# Patient Record
Sex: FEMALE | Race: WHITE | Employment: OTHER | URBAN - METROPOLITAN AREA
[De-identification: names, ages, dates, MRNs, and addresses within clinical notes are randomized per-mention and may not be internally consistent; named-entity substitution may affect disease eponyms.]

---

## 2019-07-16 ENCOUNTER — APPOINTMENT (OUTPATIENT)
Dept: CT IMAGING | Age: 64
DRG: 378 | End: 2019-07-16
Payer: COMMERCIAL

## 2019-07-16 ENCOUNTER — HOSPITAL ENCOUNTER (INPATIENT)
Age: 64
LOS: 5 days | Discharge: HOME | DRG: 378 | End: 2019-07-21
Attending: EMERGENCY MEDICINE | Admitting: GENERAL PRACTICE
Payer: COMMERCIAL

## 2019-07-16 ENCOUNTER — ANESTHESIA EVENT (OUTPATIENT)
Dept: ENDOSCOPY | Age: 64
DRG: 378 | End: 2019-07-16
Payer: COMMERCIAL

## 2019-07-16 ENCOUNTER — ANESTHESIA (OUTPATIENT)
Dept: ENDOSCOPY | Age: 64
DRG: 378 | End: 2019-07-16
Payer: COMMERCIAL

## 2019-07-16 VITALS — DIASTOLIC BLOOD PRESSURE: 58 MMHG | SYSTOLIC BLOOD PRESSURE: 113 MMHG | OXYGEN SATURATION: 99 %

## 2019-07-16 DIAGNOSIS — N39.0 URINARY TRACT INFECTION IN FEMALE: ICD-10-CM

## 2019-07-16 DIAGNOSIS — R74.8 ELEVATED LIPASE: ICD-10-CM

## 2019-07-16 DIAGNOSIS — E11.65 UNCONTROLLED TYPE 2 DIABETES MELLITUS WITH HYPERGLYCEMIA (HCC): ICD-10-CM

## 2019-07-16 DIAGNOSIS — E87.5 HYPERKALEMIA: ICD-10-CM

## 2019-07-16 DIAGNOSIS — N28.9 RENAL INSUFFICIENCY: ICD-10-CM

## 2019-07-16 DIAGNOSIS — K92.2 UPPER GI BLEED: Primary | ICD-10-CM

## 2019-07-16 DIAGNOSIS — D64.9 ANEMIA, UNSPECIFIED TYPE: ICD-10-CM

## 2019-07-16 LAB
ABO/RH: NORMAL
ALBUMIN SERPL-MCNC: 3.9 G/DL (ref 3.5–5.2)
ALP BLD-CCNC: 47 U/L (ref 35–104)
ALT SERPL-CCNC: 17 U/L (ref 0–32)
ANION GAP SERPL CALCULATED.3IONS-SCNC: 18 MMOL/L (ref 7–16)
ANTIBODY SCREEN: NORMAL
AST SERPL-CCNC: 19 U/L (ref 0–31)
BACTERIA: ABNORMAL /HPF
BASOPHILS ABSOLUTE: 0.07 E9/L (ref 0–0.2)
BASOPHILS RELATIVE PERCENT: 0.6 % (ref 0–2)
BILIRUB SERPL-MCNC: 0.8 MG/DL (ref 0–1.2)
BILIRUBIN URINE: NEGATIVE
BLOOD, URINE: ABNORMAL
BUN BLDV-MCNC: 68 MG/DL (ref 8–23)
CALCIUM SERPL-MCNC: 8.9 MG/DL (ref 8.6–10.2)
CHLORIDE BLD-SCNC: 97 MMOL/L (ref 98–107)
CLARITY: CLEAR
CO2: 17 MMOL/L (ref 22–29)
COLOR: YELLOW
CREAT SERPL-MCNC: 1.2 MG/DL (ref 0.5–1)
EKG ATRIAL RATE: 85 BPM
EKG P AXIS: 50 DEGREES
EKG P-R INTERVAL: 180 MS
EKG Q-T INTERVAL: 378 MS
EKG QRS DURATION: 78 MS
EKG QTC CALCULATION (BAZETT): 449 MS
EKG R AXIS: 27 DEGREES
EKG T AXIS: 49 DEGREES
EKG VENTRICULAR RATE: 85 BPM
EOSINOPHILS ABSOLUTE: 0.08 E9/L (ref 0.05–0.5)
EOSINOPHILS RELATIVE PERCENT: 0.6 % (ref 0–6)
EPITHELIAL CELLS, UA: ABNORMAL /HPF
FERRITIN: 178 NG/ML
FOLATE: 5.7 NG/ML (ref 4.8–24.2)
GFR AFRICAN AMERICAN: 55
GFR NON-AFRICAN AMERICAN: 45 ML/MIN/1.73
GLUCOSE BLD-MCNC: 494 MG/DL (ref 74–99)
GLUCOSE URINE: >=1000 MG/DL
HCT VFR BLD CALC: 22.5 % (ref 34–48)
HCT VFR BLD CALC: 22.5 % (ref 34–48)
HCT VFR BLD CALC: 25.2 % (ref 34–48)
HEMOGLOBIN: 7.4 G/DL (ref 11.5–15.5)
HEMOGLOBIN: 8.4 G/DL (ref 11.5–15.5)
IMMATURE GRANULOCYTES #: 0.16 E9/L
IMMATURE GRANULOCYTES %: 1.3 % (ref 0–5)
IMMATURE RETIC FRACT: 25.9 % (ref 3–15.9)
INR BLD: 1.1
IRON SATURATION: 35 % (ref 15–50)
IRON: 89 MCG/DL (ref 37–145)
KETONES, URINE: ABNORMAL MG/DL
LACTIC ACID, SEPSIS: 4.5 MMOL/L (ref 0.5–1.9)
LEUKOCYTE ESTERASE, URINE: ABNORMAL
LIPASE: 153 U/L (ref 13–60)
LYMPHOCYTES ABSOLUTE: 0.99 E9/L (ref 1.5–4)
LYMPHOCYTES RELATIVE PERCENT: 8 % (ref 20–42)
MCH RBC QN AUTO: 30.8 PG (ref 26–35)
MCHC RBC AUTO-ENTMCNC: 33.3 % (ref 32–34.5)
MCV RBC AUTO: 92.3 FL (ref 80–99.9)
METER GLUCOSE: 377 MG/DL (ref 74–99)
METER GLUCOSE: 468 MG/DL (ref 74–99)
MONOCYTES ABSOLUTE: 0.5 E9/L (ref 0.1–0.95)
MONOCYTES RELATIVE PERCENT: 4.1 % (ref 2–12)
NEUTROPHILS ABSOLUTE: 10.52 E9/L (ref 1.8–7.3)
NEUTROPHILS RELATIVE PERCENT: 85.4 % (ref 43–80)
NITRITE, URINE: POSITIVE
PDW BLD-RTO: 13.4 FL (ref 11.5–15)
PH UA: 5 (ref 5–9)
PLATELET # BLD: 198 E9/L (ref 130–450)
PMV BLD AUTO: 9.9 FL (ref 7–12)
POTASSIUM SERPL-SCNC: 5.3 MMOL/L (ref 3.5–5)
PROTEIN UA: NEGATIVE MG/DL
PROTHROMBIN TIME: 12.5 SEC (ref 9.3–12.4)
RBC # BLD: 2.73 E12/L (ref 3.5–5.5)
RBC UA: ABNORMAL /HPF (ref 0–2)
RETIC HGB EQUIVALENT: 35.7 PG (ref 28.2–36.6)
RETICULOCYTE ABSOLUTE COUNT: 0.11 E12/L
RETICULOCYTE COUNT PCT: 4.3 % (ref 0.4–1.9)
SODIUM BLD-SCNC: 132 MMOL/L (ref 132–146)
SPECIFIC GRAVITY UA: 1.01 (ref 1–1.03)
TOTAL IRON BINDING CAPACITY: 255 MCG/DL (ref 250–450)
TOTAL PROTEIN: 6.6 G/DL (ref 6.4–8.3)
UROBILINOGEN, URINE: 0.2 E.U./DL
VITAMIN B-12: 240 PG/ML (ref 211–946)
WBC # BLD: 12.3 E9/L (ref 4.5–11.5)
WBC UA: ABNORMAL /HPF (ref 0–5)

## 2019-07-16 PROCEDURE — C9113 INJ PANTOPRAZOLE SODIUM, VIA: HCPCS | Performed by: EMERGENCY MEDICINE

## 2019-07-16 PROCEDURE — 83550 IRON BINDING TEST: CPT

## 2019-07-16 PROCEDURE — 81001 URINALYSIS AUTO W/SCOPE: CPT

## 2019-07-16 PROCEDURE — 87088 URINE BACTERIA CULTURE: CPT

## 2019-07-16 PROCEDURE — 2580000003 HC RX 258: Performed by: EMERGENCY MEDICINE

## 2019-07-16 PROCEDURE — 2580000003 HC RX 258

## 2019-07-16 PROCEDURE — 80053 COMPREHEN METABOLIC PANEL: CPT

## 2019-07-16 PROCEDURE — 94760 N-INVAS EAR/PLS OXIMETRY 1: CPT

## 2019-07-16 PROCEDURE — 36430 TRANSFUSION BLD/BLD COMPNT: CPT

## 2019-07-16 PROCEDURE — 85610 PROTHROMBIN TIME: CPT

## 2019-07-16 PROCEDURE — C9113 INJ PANTOPRAZOLE SODIUM, VIA: HCPCS | Performed by: HOSPITALIST

## 2019-07-16 PROCEDURE — 85025 COMPLETE CBC W/AUTO DIFF WBC: CPT

## 2019-07-16 PROCEDURE — 2580000003 HC RX 258: Performed by: NURSE ANESTHETIST, CERTIFIED REGISTERED

## 2019-07-16 PROCEDURE — 85014 HEMATOCRIT: CPT

## 2019-07-16 PROCEDURE — 36415 COLL VENOUS BLD VENIPUNCTURE: CPT

## 2019-07-16 PROCEDURE — 2580000003 HC RX 258: Performed by: RADIOLOGY

## 2019-07-16 PROCEDURE — 85045 AUTOMATED RETICULOCYTE COUNT: CPT

## 2019-07-16 PROCEDURE — 99285 EMERGENCY DEPT VISIT HI MDM: CPT

## 2019-07-16 PROCEDURE — 2720000010 HC SURG SUPPLY STERILE: Performed by: INTERNAL MEDICINE

## 2019-07-16 PROCEDURE — 86923 COMPATIBILITY TEST ELECTRIC: CPT

## 2019-07-16 PROCEDURE — 6360000002 HC RX W HCPCS: Performed by: EMERGENCY MEDICINE

## 2019-07-16 PROCEDURE — 2580000003 HC RX 258: Performed by: HOSPITALIST

## 2019-07-16 PROCEDURE — 2060000000 HC ICU INTERMEDIATE R&B

## 2019-07-16 PROCEDURE — 82746 ASSAY OF FOLIC ACID SERUM: CPT

## 2019-07-16 PROCEDURE — 6360000002 HC RX W HCPCS: Performed by: HOSPITALIST

## 2019-07-16 PROCEDURE — 3700000001 HC ADD 15 MINUTES (ANESTHESIA): Performed by: INTERNAL MEDICINE

## 2019-07-16 PROCEDURE — 74177 CT ABD & PELVIS W/CONTRAST: CPT

## 2019-07-16 PROCEDURE — 86900 BLOOD TYPING SEROLOGIC ABO: CPT

## 2019-07-16 PROCEDURE — 93010 ELECTROCARDIOGRAM REPORT: CPT | Performed by: INTERNAL MEDICINE

## 2019-07-16 PROCEDURE — 6370000000 HC RX 637 (ALT 250 FOR IP): Performed by: EMERGENCY MEDICINE

## 2019-07-16 PROCEDURE — 3609013000 HC EGD TRANSORAL CONTROL BLEEDING ANY METHOD: Performed by: INTERNAL MEDICINE

## 2019-07-16 PROCEDURE — 6360000002 HC RX W HCPCS: Performed by: NURSE ANESTHETIST, CERTIFIED REGISTERED

## 2019-07-16 PROCEDURE — 6360000002 HC RX W HCPCS: Performed by: INTERNAL MEDICINE

## 2019-07-16 PROCEDURE — 85018 HEMOGLOBIN: CPT

## 2019-07-16 PROCEDURE — 82728 ASSAY OF FERRITIN: CPT

## 2019-07-16 PROCEDURE — 83690 ASSAY OF LIPASE: CPT

## 2019-07-16 PROCEDURE — 83605 ASSAY OF LACTIC ACID: CPT

## 2019-07-16 PROCEDURE — 88305 TISSUE EXAM BY PATHOLOGIST: CPT

## 2019-07-16 PROCEDURE — 83540 ASSAY OF IRON: CPT

## 2019-07-16 PROCEDURE — 82607 VITAMIN B-12: CPT

## 2019-07-16 PROCEDURE — 3700000000 HC ANESTHESIA ATTENDED CARE: Performed by: INTERNAL MEDICINE

## 2019-07-16 PROCEDURE — 6360000004 HC RX CONTRAST MEDICATION: Performed by: RADIOLOGY

## 2019-07-16 PROCEDURE — 88342 IMHCHEM/IMCYTCHM 1ST ANTB: CPT

## 2019-07-16 PROCEDURE — P9016 RBC LEUKOCYTES REDUCED: HCPCS

## 2019-07-16 PROCEDURE — 87186 SC STD MICRODIL/AGAR DIL: CPT

## 2019-07-16 PROCEDURE — 2580000003 HC RX 258: Performed by: INTERNAL MEDICINE

## 2019-07-16 PROCEDURE — 7100000011 HC PHASE II RECOVERY - ADDTL 15 MIN: Performed by: INTERNAL MEDICINE

## 2019-07-16 PROCEDURE — 7100000010 HC PHASE II RECOVERY - FIRST 15 MIN: Performed by: INTERNAL MEDICINE

## 2019-07-16 PROCEDURE — 86901 BLOOD TYPING SEROLOGIC RH(D): CPT

## 2019-07-16 PROCEDURE — 86850 RBC ANTIBODY SCREEN: CPT

## 2019-07-16 PROCEDURE — 87077 CULTURE AEROBIC IDENTIFY: CPT

## 2019-07-16 PROCEDURE — 093K7ZZ CONTROL BLEEDING IN NASAL MUCOSA AND SOFT TISSUE, VIA NATURAL OR ARTIFICIAL OPENING: ICD-10-PCS | Performed by: INTERNAL MEDICINE

## 2019-07-16 PROCEDURE — 93005 ELECTROCARDIOGRAM TRACING: CPT | Performed by: EMERGENCY MEDICINE

## 2019-07-16 PROCEDURE — 2709999900 HC NON-CHARGEABLE SUPPLY: Performed by: INTERNAL MEDICINE

## 2019-07-16 PROCEDURE — 82962 GLUCOSE BLOOD TEST: CPT

## 2019-07-16 PROCEDURE — 0DB78ZX EXCISION OF STOMACH, PYLORUS, VIA NATURAL OR ARTIFICIAL OPENING ENDOSCOPIC, DIAGNOSTIC: ICD-10-PCS | Performed by: INTERNAL MEDICINE

## 2019-07-16 PROCEDURE — 96374 THER/PROPH/DIAG INJ IV PUSH: CPT

## 2019-07-16 PROCEDURE — P9040 RBC LEUKOREDUCED IRRADIATED: HCPCS

## 2019-07-16 RX ORDER — METOCLOPRAMIDE HYDROCHLORIDE 5 MG/ML
10 INJECTION INTRAMUSCULAR; INTRAVENOUS ONCE
Status: COMPLETED | OUTPATIENT
Start: 2019-07-16 | End: 2019-07-16

## 2019-07-16 RX ORDER — 0.9 % SODIUM CHLORIDE 0.9 %
1000 INTRAVENOUS SOLUTION INTRAVENOUS ONCE
Status: COMPLETED | OUTPATIENT
Start: 2019-07-16 | End: 2019-07-16

## 2019-07-16 RX ORDER — PROPOFOL 10 MG/ML
INJECTION, EMULSION INTRAVENOUS PRN
Status: DISCONTINUED | OUTPATIENT
Start: 2019-07-16 | End: 2019-07-16 | Stop reason: SDUPTHER

## 2019-07-16 RX ORDER — SODIUM CHLORIDE 0.9 % (FLUSH) 0.9 %
10 SYRINGE (ML) INJECTION ONCE
Status: COMPLETED | OUTPATIENT
Start: 2019-07-16 | End: 2019-07-16

## 2019-07-16 RX ORDER — SODIUM CHLORIDE 0.9 % (FLUSH) 0.9 %
SYRINGE (ML) INJECTION
Status: DISPENSED
Start: 2019-07-16 | End: 2019-07-17

## 2019-07-16 RX ORDER — SODIUM CHLORIDE 0.9 % (FLUSH) 0.9 %
SYRINGE (ML) INJECTION
Status: DISPENSED
Start: 2019-07-16 | End: 2019-07-16

## 2019-07-16 RX ORDER — LISINOPRIL 20 MG/1
20 TABLET ORAL DAILY
COMMUNITY

## 2019-07-16 RX ORDER — GABAPENTIN 300 MG/1
300 CAPSULE ORAL 2 TIMES DAILY
COMMUNITY

## 2019-07-16 RX ORDER — SODIUM CHLORIDE 0.9 % (FLUSH) 0.9 %
SYRINGE (ML) INJECTION
Status: COMPLETED
Start: 2019-07-16 | End: 2019-07-16

## 2019-07-16 RX ORDER — PANTOPRAZOLE SODIUM 40 MG/10ML
80 INJECTION, POWDER, LYOPHILIZED, FOR SOLUTION INTRAVENOUS ONCE
Status: COMPLETED | OUTPATIENT
Start: 2019-07-16 | End: 2019-07-16

## 2019-07-16 RX ORDER — SODIUM CHLORIDE 9 MG/ML
INJECTION, SOLUTION INTRAVENOUS CONTINUOUS
Status: DISCONTINUED | OUTPATIENT
Start: 2019-07-16 | End: 2019-07-19

## 2019-07-16 RX ORDER — BISOPROLOL FUMARATE AND HYDROCHLOROTHIAZIDE 10; 6.25 MG/1; MG/1
1 TABLET ORAL DAILY
COMMUNITY

## 2019-07-16 RX ORDER — GLIMEPIRIDE 2 MG/1
2 TABLET ORAL
COMMUNITY

## 2019-07-16 RX ORDER — SODIUM CHLORIDE 9 MG/ML
INJECTION, SOLUTION INTRAVENOUS CONTINUOUS PRN
Status: DISCONTINUED | OUTPATIENT
Start: 2019-07-16 | End: 2019-07-16 | Stop reason: SDUPTHER

## 2019-07-16 RX ORDER — 0.9 % SODIUM CHLORIDE 0.9 %
250 INTRAVENOUS SOLUTION INTRAVENOUS ONCE
Status: COMPLETED | OUTPATIENT
Start: 2019-07-16 | End: 2019-07-17

## 2019-07-16 RX ADMIN — PROPOFOL 200 MG: 10 INJECTION, EMULSION INTRAVENOUS at 15:24

## 2019-07-16 RX ADMIN — SODIUM CHLORIDE: 9 INJECTION, SOLUTION INTRAVENOUS at 13:40

## 2019-07-16 RX ADMIN — PROPOFOL 50 MG: 10 INJECTION, EMULSION INTRAVENOUS at 15:35

## 2019-07-16 RX ADMIN — METOCLOPRAMIDE 10 MG: 5 INJECTION, SOLUTION INTRAMUSCULAR; INTRAVENOUS at 12:58

## 2019-07-16 RX ADMIN — SODIUM CHLORIDE: 9 INJECTION, SOLUTION INTRAVENOUS at 12:32

## 2019-07-16 RX ADMIN — PANTOPRAZOLE SODIUM 8 MG/HR: 40 INJECTION, POWDER, FOR SOLUTION INTRAVENOUS at 12:31

## 2019-07-16 RX ADMIN — PROPOFOL 50 MG: 10 INJECTION, EMULSION INTRAVENOUS at 15:27

## 2019-07-16 RX ADMIN — Medication 10 ML: at 12:58

## 2019-07-16 RX ADMIN — CEFTRIAXONE 1 G: 1 INJECTION, POWDER, FOR SOLUTION INTRAMUSCULAR; INTRAVENOUS at 11:29

## 2019-07-16 RX ADMIN — PROPOFOL 50 MG: 10 INJECTION, EMULSION INTRAVENOUS at 15:43

## 2019-07-16 RX ADMIN — IOPAMIDOL 110 ML: 755 INJECTION, SOLUTION INTRAVENOUS at 09:36

## 2019-07-16 RX ADMIN — PANTOPRAZOLE SODIUM 8 MG/HR: 40 INJECTION, POWDER, FOR SOLUTION INTRAVENOUS at 22:09

## 2019-07-16 RX ADMIN — PANTOPRAZOLE SODIUM 80 MG: 40 INJECTION, POWDER, LYOPHILIZED, FOR SOLUTION INTRAVENOUS at 09:00

## 2019-07-16 RX ADMIN — PROPOFOL 50 MG: 10 INJECTION, EMULSION INTRAVENOUS at 15:32

## 2019-07-16 RX ADMIN — SODIUM CHLORIDE 250 ML: 9 INJECTION, SOLUTION INTRAVENOUS at 20:43

## 2019-07-16 RX ADMIN — SODIUM CHLORIDE 1000 ML: 9 INJECTION, SOLUTION INTRAVENOUS at 11:45

## 2019-07-16 RX ADMIN — PROPOFOL 50 MG: 10 INJECTION, EMULSION INTRAVENOUS at 15:39

## 2019-07-16 RX ADMIN — PROPOFOL 50 MG: 10 INJECTION, EMULSION INTRAVENOUS at 15:29

## 2019-07-16 RX ADMIN — Medication 10 ML: at 09:36

## 2019-07-16 RX ADMIN — INSULIN HUMAN 10 UNITS: 100 INJECTION, SOLUTION PARENTERAL at 11:40

## 2019-07-16 ASSESSMENT — PAIN SCALES - GENERAL
PAINLEVEL_OUTOF10: 0

## 2019-07-16 NOTE — ED PROVIDER NOTES
for comparison. Patient also is hyperglycemic with a glucose of 494. Patient given 10 units of insulin. Otherwise, patient with acute renal insufficiency with a creatinine of 1.2 and BUN of 68, presumably from the patient's acute GI bleed. Patient also has a nonspecifically elevated lipase of 153. Urinalysis is concerning for infection with positive nitrates, leukocytes, bacteria, and white blood cells. Urine sent for culture and patient started on 1 g of IV Rocephin. CT the abdomen and pelvis shows no significant acute intra-abdominal or intrapelvic disease process. There is no active extravasation. Pt continues to be NPO. Will admit for urgent endoscopy evaluation of source of GI bleed. Counseling: The emergency provider has spoken with the patient and family member patient and sister and discussed todays results, in addition to providing specific details for the plan of care and counseling regarding the diagnosis and prognosis. Questions are answered at this time and they are agreeable with the plan.      --------------------------------- IMPRESSION AND DISPOSITION ---------------------------------    IMPRESSION  1. Upper GI bleed    2. Renal insufficiency    3. Urinary tract infection in female    4. Anemia, unspecified type    5. Uncontrolled type 2 diabetes mellitus with hyperglycemia (HCC)    6. Elevated lipase    7. Hyperkalemia        DISPOSITION  Disposition: Admit to telemetry  Patient condition is stable      NOTE: This report was transcribed using voice recognition software.  Every effort was made to ensure accuracy; however, inadvertent computerized transcription errors may be present        Ann Liriano MD  07/16/19 [id number]

## 2019-07-16 NOTE — ANESTHESIA PRE PROCEDURE
Department of Anesthesiology  Preprocedure Note       Name:  Mercedez Clark   Age:  61 y.o.  :  1955                                          MRN:  [id number]         Date:  2019      Surgeon: Merlene Owens):  Susi Issa MD    Procedure: EGD ESOPHAGOGASTRODUODENOSCOPY (N/A )    Medications prior to admission:   Prior to Admission medications    Medication Sig Start Date End Date Taking? Authorizing Provider   glimepiride (AMARYL) 2 MG tablet Take 2 mg by mouth every morning (before breakfast)   Yes Historical Provider, MD   gabapentin (NEURONTIN) 300 MG capsule Take 300 mg by mouth 2 times daily. Yes Historical Provider, MD   SITagliptin (JANUVIA) 50 MG tablet Take 50 mg by mouth daily   Yes Historical Provider, MD   bisoprolol-hydrochlorothiazide (ZIAC) 10-6.25 MG per tablet Take 1 tablet by mouth daily   Yes Historical Provider, MD   lisinopril (PRINIVIL;ZESTRIL) 20 MG tablet Take 20 mg by mouth daily   Yes Historical Provider, MD   metformin (GLUCOPHAGE) 500 MG tablet Take 500 mg by mouth 2 times daily (with meals).    Yes Historical Provider, MD       Current medications:    Current Facility-Administered Medications   Medication Dose Route Frequency Provider Last Rate Last Dose    sodium chloride flush 0.9 % injection             pantoprazole (PROTONIX) 80 mg in sodium chloride 0.9 % 100 mL infusion  8 mg/hr Intravenous Continuous Elke Adames MD 10 mL/hr at 19 1231 8 mg/hr at 19 1231    0.9 % sodium chloride infusion   Intravenous Continuous Elke Adames  mL/hr at 19 1232         Allergies:  No Known Allergies    Problem List:    Patient Active Problem List   Diagnosis Code    GI bleed K92.2       Past Medical History:        Diagnosis Date    Diabetes mellitus ([facility] [address]     poorly controlled    Hypertension        Past Surgical History:        Procedure Laterality Date    APPENDECTOMY      CARPAL TUNNEL RELEASE      FRACTURE SURGERY      rt ankle

## 2019-07-17 LAB
ALBUMIN SERPL-MCNC: 3.7 G/DL (ref 3.5–5.2)
ALP BLD-CCNC: 36 U/L (ref 35–104)
ALT SERPL-CCNC: 13 U/L (ref 0–32)
ANION GAP SERPL CALCULATED.3IONS-SCNC: 11 MMOL/L (ref 7–16)
AST SERPL-CCNC: 12 U/L (ref 0–31)
BILIRUB SERPL-MCNC: 1 MG/DL (ref 0–1.2)
BUN BLDV-MCNC: 64 MG/DL (ref 8–23)
CALCIUM SERPL-MCNC: 8.5 MG/DL (ref 8.6–10.2)
CHLORIDE BLD-SCNC: 101 MMOL/L (ref 98–107)
CO2: 18 MMOL/L (ref 22–29)
CREAT SERPL-MCNC: 1.1 MG/DL (ref 0.5–1)
GFR AFRICAN AMERICAN: >60
GFR NON-AFRICAN AMERICAN: 50 ML/MIN/1.73
GLUCOSE BLD-MCNC: 523 MG/DL (ref 74–99)
HCT VFR BLD CALC: 21.4 % (ref 34–48)
HCT VFR BLD CALC: 23.1 % (ref 34–48)
HCT VFR BLD CALC: 23.4 % (ref 34–48)
HCT VFR BLD CALC: 27.8 % (ref 34–48)
HEMOGLOBIN: 7.2 G/DL (ref 11.5–15.5)
HEMOGLOBIN: 7.6 G/DL (ref 11.5–15.5)
HEMOGLOBIN: 8 G/DL (ref 11.5–15.5)
HEMOGLOBIN: 9.2 G/DL (ref 11.5–15.5)
METER GLUCOSE: 159 MG/DL (ref 74–99)
METER GLUCOSE: 293 MG/DL (ref 74–99)
METER GLUCOSE: 342 MG/DL (ref 74–99)
METER GLUCOSE: 493 MG/DL (ref 74–99)
POTASSIUM SERPL-SCNC: 4.8 MMOL/L (ref 3.5–5)
SODIUM BLD-SCNC: 130 MMOL/L (ref 132–146)
TOTAL PROTEIN: 6 G/DL (ref 6.4–8.3)

## 2019-07-17 PROCEDURE — 2060000000 HC ICU INTERMEDIATE R&B

## 2019-07-17 PROCEDURE — 82272 OCCULT BLD FECES 1-3 TESTS: CPT

## 2019-07-17 PROCEDURE — 6360000002 HC RX W HCPCS: Performed by: HOSPITALIST

## 2019-07-17 PROCEDURE — 85018 HEMOGLOBIN: CPT

## 2019-07-17 PROCEDURE — 85014 HEMATOCRIT: CPT

## 2019-07-17 PROCEDURE — 2580000003 HC RX 258: Performed by: HOSPITALIST

## 2019-07-17 PROCEDURE — 86923 COMPATIBILITY TEST ELECTRIC: CPT

## 2019-07-17 PROCEDURE — 6370000000 HC RX 637 (ALT 250 FOR IP): Performed by: GENERAL PRACTICE

## 2019-07-17 PROCEDURE — 80053 COMPREHEN METABOLIC PANEL: CPT

## 2019-07-17 PROCEDURE — C9113 INJ PANTOPRAZOLE SODIUM, VIA: HCPCS | Performed by: HOSPITALIST

## 2019-07-17 PROCEDURE — P9016 RBC LEUKOCYTES REDUCED: HCPCS

## 2019-07-17 PROCEDURE — 6360000002 HC RX W HCPCS: Performed by: GENERAL PRACTICE

## 2019-07-17 PROCEDURE — 82962 GLUCOSE BLOOD TEST: CPT

## 2019-07-17 PROCEDURE — 36415 COLL VENOUS BLD VENIPUNCTURE: CPT

## 2019-07-17 RX ORDER — GABAPENTIN 300 MG/1
300 CAPSULE ORAL 2 TIMES DAILY
Status: DISCONTINUED | OUTPATIENT
Start: 2019-07-17 | End: 2019-07-21 | Stop reason: HOSPADM

## 2019-07-17 RX ORDER — 0.9 % SODIUM CHLORIDE 0.9 %
250 INTRAVENOUS SOLUTION INTRAVENOUS ONCE
Status: DISCONTINUED | OUTPATIENT
Start: 2019-07-17 | End: 2019-07-21 | Stop reason: HOSPADM

## 2019-07-17 RX ORDER — HYDROCHLOROTHIAZIDE 12.5 MG/1
6.25 TABLET ORAL DAILY
Status: DISCONTINUED | OUTPATIENT
Start: 2019-07-17 | End: 2019-07-21 | Stop reason: HOSPADM

## 2019-07-17 RX ORDER — LISINOPRIL 20 MG/1
20 TABLET ORAL DAILY
Status: DISCONTINUED | OUTPATIENT
Start: 2019-07-17 | End: 2019-07-21 | Stop reason: HOSPADM

## 2019-07-17 RX ORDER — BISOPROLOL FUMARATE AND HYDROCHLOROTHIAZIDE 10; 6.25 MG/1; MG/1
1 TABLET ORAL DAILY
Status: DISCONTINUED | OUTPATIENT
Start: 2019-07-17 | End: 2019-07-17 | Stop reason: CLARIF

## 2019-07-17 RX ORDER — METOPROLOL TARTRATE 50 MG/1
50 TABLET, FILM COATED ORAL 2 TIMES DAILY
Status: DISCONTINUED | OUTPATIENT
Start: 2019-07-17 | End: 2019-07-21 | Stop reason: HOSPADM

## 2019-07-17 RX ORDER — BISOPROLOL FUMARATE AND HYDROCHLOROTHIAZIDE 10; 6.25 MG/1; MG/1
1 TABLET ORAL DAILY
Status: DISCONTINUED | OUTPATIENT
Start: 2019-07-17 | End: 2019-07-17 | Stop reason: ALTCHOICE

## 2019-07-17 RX ORDER — GLIMEPIRIDE 2 MG/1
2 TABLET ORAL
Status: DISCONTINUED | OUTPATIENT
Start: 2019-07-17 | End: 2019-07-21 | Stop reason: HOSPADM

## 2019-07-17 RX ORDER — ONDANSETRON 2 MG/ML
4 INJECTION INTRAMUSCULAR; INTRAVENOUS EVERY 6 HOURS PRN
Status: DISCONTINUED | OUTPATIENT
Start: 2019-07-17 | End: 2019-07-21 | Stop reason: HOSPADM

## 2019-07-17 RX ADMIN — INSULIN LISPRO 15 UNITS: 100 INJECTION, SOLUTION INTRAVENOUS; SUBCUTANEOUS at 12:10

## 2019-07-17 RX ADMIN — PANTOPRAZOLE SODIUM 8 MG/HR: 40 INJECTION, POWDER, FOR SOLUTION INTRAVENOUS at 09:38

## 2019-07-17 RX ADMIN — METOPROLOL TARTRATE 50 MG: 50 TABLET ORAL at 09:37

## 2019-07-17 RX ADMIN — INSULIN LISPRO 1 UNITS: 100 INJECTION, SOLUTION INTRAVENOUS; SUBCUTANEOUS at 21:56

## 2019-07-17 RX ADMIN — HYDROCHLOROTHIAZIDE 6.25 MG: 12.5 TABLET ORAL at 09:37

## 2019-07-17 RX ADMIN — GABAPENTIN 300 MG: 300 CAPSULE ORAL at 09:37

## 2019-07-17 RX ADMIN — LISINOPRIL 20 MG: 20 TABLET ORAL at 09:37

## 2019-07-17 RX ADMIN — INSULIN LISPRO 3 UNITS: 100 INJECTION, SOLUTION INTRAVENOUS; SUBCUTANEOUS at 16:23

## 2019-07-17 RX ADMIN — GABAPENTIN 300 MG: 300 CAPSULE ORAL at 21:56

## 2019-07-17 RX ADMIN — LINAGLIPTIN 5 MG: 5 TABLET, FILM COATED ORAL at 09:37

## 2019-07-17 RX ADMIN — ONDANSETRON 4 MG: 2 INJECTION INTRAMUSCULAR; INTRAVENOUS at 09:37

## 2019-07-17 RX ADMIN — INSULIN LISPRO 6 UNITS: 100 INJECTION, SOLUTION INTRAVENOUS; SUBCUTANEOUS at 11:25

## 2019-07-17 RX ADMIN — GLIMEPIRIDE 2 MG: 2 TABLET ORAL at 09:37

## 2019-07-17 ASSESSMENT — PAIN SCALES - GENERAL
PAINLEVEL_OUTOF10: 0

## 2019-07-18 ENCOUNTER — ANESTHESIA (OUTPATIENT)
Dept: ENDOSCOPY | Age: 64
DRG: 378 | End: 2019-07-18
Payer: COMMERCIAL

## 2019-07-18 ENCOUNTER — APPOINTMENT (OUTPATIENT)
Dept: NUCLEAR MEDICINE | Age: 64
DRG: 378 | End: 2019-07-18
Payer: COMMERCIAL

## 2019-07-18 ENCOUNTER — ANESTHESIA EVENT (OUTPATIENT)
Dept: ENDOSCOPY | Age: 64
DRG: 378 | End: 2019-07-18
Payer: COMMERCIAL

## 2019-07-18 VITALS
OXYGEN SATURATION: 100 % | DIASTOLIC BLOOD PRESSURE: 51 MMHG | SYSTOLIC BLOOD PRESSURE: 94 MMHG | RESPIRATION RATE: 21 BRPM

## 2019-07-18 LAB
HCT VFR BLD CALC: 19.8 % (ref 34–48)
HCT VFR BLD CALC: 23.3 % (ref 34–48)
HCT VFR BLD CALC: 23.4 % (ref 34–48)
HEMOGLOBIN: 6.5 G/DL (ref 11.5–15.5)
HEMOGLOBIN: 7.6 G/DL (ref 11.5–15.5)
HEMOGLOBIN: 7.9 G/DL (ref 11.5–15.5)
METER GLUCOSE: 233 MG/DL (ref 74–99)
METER GLUCOSE: 285 MG/DL (ref 74–99)
METER GLUCOSE: 288 MG/DL (ref 74–99)
METER GLUCOSE: 292 MG/DL (ref 74–99)
METER GLUCOSE: 324 MG/DL (ref 74–99)
OCCULT BLOOD DIAGNOSTIC: NORMAL
URINE CULTURE, ROUTINE: NORMAL

## 2019-07-18 PROCEDURE — P9016 RBC LEUKOCYTES REDUCED: HCPCS

## 2019-07-18 PROCEDURE — 85018 HEMOGLOBIN: CPT

## 2019-07-18 PROCEDURE — C9113 INJ PANTOPRAZOLE SODIUM, VIA: HCPCS | Performed by: HOSPITALIST

## 2019-07-18 PROCEDURE — 6360000002 HC RX W HCPCS: Performed by: GENERAL PRACTICE

## 2019-07-18 PROCEDURE — 6360000002 HC RX W HCPCS: Performed by: HOSPITALIST

## 2019-07-18 PROCEDURE — 3609013000 HC EGD TRANSORAL CONTROL BLEEDING ANY METHOD: Performed by: INTERNAL MEDICINE

## 2019-07-18 PROCEDURE — 85014 HEMATOCRIT: CPT

## 2019-07-18 PROCEDURE — A9560 TC99M LABELED RBC: HCPCS | Performed by: RADIOLOGY

## 2019-07-18 PROCEDURE — 0DJ08ZZ INSPECTION OF UPPER INTESTINAL TRACT, VIA NATURAL OR ARTIFICIAL OPENING ENDOSCOPIC: ICD-10-PCS | Performed by: INTERNAL MEDICINE

## 2019-07-18 PROCEDURE — 2720000010 HC SURG SUPPLY STERILE: Performed by: INTERNAL MEDICINE

## 2019-07-18 PROCEDURE — 3700000001 HC ADD 15 MINUTES (ANESTHESIA): Performed by: INTERNAL MEDICINE

## 2019-07-18 PROCEDURE — 6370000000 HC RX 637 (ALT 250 FOR IP): Performed by: GENERAL PRACTICE

## 2019-07-18 PROCEDURE — 2060000000 HC ICU INTERMEDIATE R&B

## 2019-07-18 PROCEDURE — 7100000011 HC PHASE II RECOVERY - ADDTL 15 MIN: Performed by: INTERNAL MEDICINE

## 2019-07-18 PROCEDURE — 36415 COLL VENOUS BLD VENIPUNCTURE: CPT

## 2019-07-18 PROCEDURE — 86923 COMPATIBILITY TEST ELECTRIC: CPT

## 2019-07-18 PROCEDURE — 82962 GLUCOSE BLOOD TEST: CPT

## 2019-07-18 PROCEDURE — 2580000003 HC RX 258: Performed by: NURSE ANESTHETIST, CERTIFIED REGISTERED

## 2019-07-18 PROCEDURE — 3700000000 HC ANESTHESIA ATTENDED CARE: Performed by: INTERNAL MEDICINE

## 2019-07-18 PROCEDURE — 7100000010 HC PHASE II RECOVERY - FIRST 15 MIN: Performed by: INTERNAL MEDICINE

## 2019-07-18 PROCEDURE — 2580000003 HC RX 258: Performed by: HOSPITALIST

## 2019-07-18 PROCEDURE — 6360000002 HC RX W HCPCS: Performed by: NURSE ANESTHETIST, CERTIFIED REGISTERED

## 2019-07-18 PROCEDURE — 36430 TRANSFUSION BLD/BLD COMPNT: CPT

## 2019-07-18 PROCEDURE — 2709999900 HC NON-CHARGEABLE SUPPLY: Performed by: INTERNAL MEDICINE

## 2019-07-18 PROCEDURE — 6370000000 HC RX 637 (ALT 250 FOR IP): Performed by: INTERNAL MEDICINE

## 2019-07-18 PROCEDURE — 78278 ACUTE GI BLOOD LOSS IMAGING: CPT

## 2019-07-18 PROCEDURE — 3430000000 HC RX DIAGNOSTIC RADIOPHARMACEUTICAL: Performed by: RADIOLOGY

## 2019-07-18 RX ORDER — 0.9 % SODIUM CHLORIDE 0.9 %
250 INTRAVENOUS SOLUTION INTRAVENOUS ONCE
Status: COMPLETED | OUTPATIENT
Start: 2019-07-18 | End: 2019-07-18

## 2019-07-18 RX ORDER — PROPOFOL 10 MG/ML
INJECTION, EMULSION INTRAVENOUS PRN
Status: DISCONTINUED | OUTPATIENT
Start: 2019-07-18 | End: 2019-07-18 | Stop reason: SDUPTHER

## 2019-07-18 RX ORDER — SODIUM CHLORIDE 9 MG/ML
INJECTION, SOLUTION INTRAVENOUS CONTINUOUS PRN
Status: DISCONTINUED | OUTPATIENT
Start: 2019-07-18 | End: 2019-07-18 | Stop reason: SDUPTHER

## 2019-07-18 RX ADMIN — ONDANSETRON 4 MG: 2 INJECTION INTRAMUSCULAR; INTRAVENOUS at 22:40

## 2019-07-18 RX ADMIN — MAGNESIUM CITRATE 296 ML: 1.75 LIQUID ORAL at 20:18

## 2019-07-18 RX ADMIN — GABAPENTIN 300 MG: 300 CAPSULE ORAL at 20:10

## 2019-07-18 RX ADMIN — SODIUM CHLORIDE: 9 INJECTION, SOLUTION INTRAVENOUS at 16:26

## 2019-07-18 RX ADMIN — Medication 20 MILLICURIE: at 08:14

## 2019-07-18 RX ADMIN — INSULIN LISPRO 3 UNITS: 100 INJECTION, SOLUTION INTRAVENOUS; SUBCUTANEOUS at 11:38

## 2019-07-18 RX ADMIN — INSULIN LISPRO 2 UNITS: 100 INJECTION, SOLUTION INTRAVENOUS; SUBCUTANEOUS at 20:09

## 2019-07-18 RX ADMIN — SODIUM CHLORIDE: 9 INJECTION, SOLUTION INTRAVENOUS at 11:43

## 2019-07-18 RX ADMIN — PANTOPRAZOLE SODIUM 8 MG/HR: 40 INJECTION, POWDER, FOR SOLUTION INTRAVENOUS at 00:02

## 2019-07-18 RX ADMIN — GABAPENTIN 300 MG: 300 CAPSULE ORAL at 11:34

## 2019-07-18 RX ADMIN — PANTOPRAZOLE SODIUM 8 MG/HR: 40 INJECTION, POWDER, FOR SOLUTION INTRAVENOUS at 11:43

## 2019-07-18 RX ADMIN — PANTOPRAZOLE SODIUM 8 MG/HR: 40 INJECTION, POWDER, FOR SOLUTION INTRAVENOUS at 22:40

## 2019-07-18 RX ADMIN — INSULIN LISPRO 3 UNITS: 100 INJECTION, SOLUTION INTRAVENOUS; SUBCUTANEOUS at 18:23

## 2019-07-18 RX ADMIN — PROPOFOL 150 MG: 10 INJECTION, EMULSION INTRAVENOUS at 16:27

## 2019-07-18 RX ADMIN — SODIUM CHLORIDE 250 ML: 9 INJECTION, SOLUTION INTRAVENOUS at 15:52

## 2019-07-18 RX ADMIN — LINAGLIPTIN 5 MG: 5 TABLET, FILM COATED ORAL at 11:34

## 2019-07-18 RX ADMIN — METOPROLOL TARTRATE 50 MG: 50 TABLET ORAL at 11:34

## 2019-07-18 RX ADMIN — INSULIN LISPRO 2 UNITS: 100 INJECTION, SOLUTION INTRAVENOUS; SUBCUTANEOUS at 05:40

## 2019-07-18 ASSESSMENT — PAIN SCALES - GENERAL
PAINLEVEL_OUTOF10: 0

## 2019-07-18 NOTE — PROCEDURES
has never had a colonoscopy and I recommend doing tomorrow as I do not see strong evidence of rebleed of the duodenal ulcer at this time. See orders. Follow up as outpatient in office, call [phone] to schedule for appointment.       DO Ilan  7/18/2019  4:34 PM

## 2019-07-19 ENCOUNTER — ANESTHESIA EVENT (OUTPATIENT)
Dept: ENDOSCOPY | Age: 64
DRG: 378 | End: 2019-07-19
Payer: COMMERCIAL

## 2019-07-19 ENCOUNTER — ANESTHESIA (OUTPATIENT)
Dept: ENDOSCOPY | Age: 64
DRG: 378 | End: 2019-07-19
Payer: COMMERCIAL

## 2019-07-19 VITALS — SYSTOLIC BLOOD PRESSURE: 89 MMHG | DIASTOLIC BLOOD PRESSURE: 55 MMHG | OXYGEN SATURATION: 100 %

## 2019-07-19 LAB
HCT VFR BLD CALC: 22.7 % (ref 34–48)
HCT VFR BLD CALC: 24.7 % (ref 34–48)
HEMOGLOBIN: 7.4 G/DL (ref 11.5–15.5)
HEMOGLOBIN: 8.1 G/DL (ref 11.5–15.5)
METER GLUCOSE: 164 MG/DL (ref 74–99)
METER GLUCOSE: 172 MG/DL (ref 74–99)
METER GLUCOSE: 185 MG/DL (ref 74–99)
METER GLUCOSE: 220 MG/DL (ref 74–99)

## 2019-07-19 PROCEDURE — 2580000003 HC RX 258: Performed by: NURSE ANESTHETIST, CERTIFIED REGISTERED

## 2019-07-19 PROCEDURE — 6370000000 HC RX 637 (ALT 250 FOR IP): Performed by: GENERAL PRACTICE

## 2019-07-19 PROCEDURE — 2709999900 HC NON-CHARGEABLE SUPPLY: Performed by: INTERNAL MEDICINE

## 2019-07-19 PROCEDURE — 85018 HEMOGLOBIN: CPT

## 2019-07-19 PROCEDURE — 2580000003 HC RX 258: Performed by: HOSPITALIST

## 2019-07-19 PROCEDURE — 6360000002 HC RX W HCPCS: Performed by: NURSE ANESTHETIST, CERTIFIED REGISTERED

## 2019-07-19 PROCEDURE — 3609009500 HC COLONOSCOPY DIAGNOSTIC OR SCREENING: Performed by: INTERNAL MEDICINE

## 2019-07-19 PROCEDURE — 7100000010 HC PHASE II RECOVERY - FIRST 15 MIN: Performed by: INTERNAL MEDICINE

## 2019-07-19 PROCEDURE — 7100000011 HC PHASE II RECOVERY - ADDTL 15 MIN: Performed by: INTERNAL MEDICINE

## 2019-07-19 PROCEDURE — 0DJD8ZZ INSPECTION OF LOWER INTESTINAL TRACT, VIA NATURAL OR ARTIFICIAL OPENING ENDOSCOPIC: ICD-10-PCS | Performed by: INTERNAL MEDICINE

## 2019-07-19 PROCEDURE — 36415 COLL VENOUS BLD VENIPUNCTURE: CPT

## 2019-07-19 PROCEDURE — 6360000002 HC RX W HCPCS: Performed by: HOSPITALIST

## 2019-07-19 PROCEDURE — 82962 GLUCOSE BLOOD TEST: CPT

## 2019-07-19 PROCEDURE — 3700000001 HC ADD 15 MINUTES (ANESTHESIA): Performed by: INTERNAL MEDICINE

## 2019-07-19 PROCEDURE — 85014 HEMATOCRIT: CPT

## 2019-07-19 PROCEDURE — 3700000000 HC ANESTHESIA ATTENDED CARE: Performed by: INTERNAL MEDICINE

## 2019-07-19 PROCEDURE — 2060000000 HC ICU INTERMEDIATE R&B

## 2019-07-19 PROCEDURE — C9113 INJ PANTOPRAZOLE SODIUM, VIA: HCPCS | Performed by: HOSPITALIST

## 2019-07-19 RX ORDER — PROPOFOL 10 MG/ML
INJECTION, EMULSION INTRAVENOUS PRN
Status: DISCONTINUED | OUTPATIENT
Start: 2019-07-19 | End: 2019-07-19 | Stop reason: SDUPTHER

## 2019-07-19 RX ORDER — SODIUM CHLORIDE 9 MG/ML
INJECTION, SOLUTION INTRAVENOUS CONTINUOUS PRN
Status: DISCONTINUED | OUTPATIENT
Start: 2019-07-19 | End: 2019-07-19 | Stop reason: SDUPTHER

## 2019-07-19 RX ADMIN — GABAPENTIN 300 MG: 300 CAPSULE ORAL at 20:31

## 2019-07-19 RX ADMIN — PROPOFOL 150 MG: 10 INJECTION, EMULSION INTRAVENOUS at 16:12

## 2019-07-19 RX ADMIN — SODIUM CHLORIDE: 9 INJECTION, SOLUTION INTRAVENOUS at 15:58

## 2019-07-19 RX ADMIN — PANTOPRAZOLE SODIUM 8 MG/HR: 40 INJECTION, POWDER, FOR SOLUTION INTRAVENOUS at 09:29

## 2019-07-19 RX ADMIN — PANTOPRAZOLE SODIUM 8 MG/HR: 40 INJECTION, POWDER, FOR SOLUTION INTRAVENOUS at 19:11

## 2019-07-19 RX ADMIN — SODIUM CHLORIDE: 9 INJECTION, SOLUTION INTRAVENOUS at 08:06

## 2019-07-19 RX ADMIN — METFORMIN HYDROCHLORIDE 500 MG: 500 TABLET ORAL at 18:17

## 2019-07-19 RX ADMIN — PROPOFOL 70 MG: 10 INJECTION, EMULSION INTRAVENOUS at 16:17

## 2019-07-19 RX ADMIN — INSULIN LISPRO 2 UNITS: 100 INJECTION, SOLUTION INTRAVENOUS; SUBCUTANEOUS at 05:55

## 2019-07-19 RX ADMIN — SODIUM CHLORIDE: 9 INJECTION, SOLUTION INTRAVENOUS at 01:08

## 2019-07-19 RX ADMIN — METOPROLOL TARTRATE 50 MG: 50 TABLET ORAL at 20:31

## 2019-07-19 ASSESSMENT — PAIN SCALES - GENERAL
PAINLEVEL_OUTOF10: 0

## 2019-07-19 NOTE — ANESTHESIA PRE PROCEDURE
Department of Anesthesiology  Preprocedure Note       Name:  Nancy Fink   Age:  61 y.o.  :  1955                                          MRN:  [id number]         Date:  2019      Surgeon: Earline Rodriguez):  Yousuf Crain MD    Procedure: COLONOSCOPY DIAGNOSTIC (N/A )    Medications prior to admission:   Prior to Admission medications    Medication Sig Start Date End Date Taking? Authorizing Provider   glimepiride (AMARYL) 2 MG tablet Take 2 mg by mouth every morning (before breakfast)    Historical Provider, MD   gabapentin (NEURONTIN) 300 MG capsule Take 300 mg by mouth 2 times daily. Historical Provider, MD   SITagliptin (JANUVIA) 50 MG tablet Take 50 mg by mouth daily    Historical Provider, MD   bisoprolol-hydrochlorothiazide (ZIAC) 10-6.25 MG per tablet Take 1 tablet by mouth daily    Historical Provider, MD   lisinopril (PRINIVIL;ZESTRIL) 20 MG tablet Take 20 mg by mouth daily    Historical Provider, MD   metformin (GLUCOPHAGE) 500 MG tablet Take 500 mg by mouth 2 times daily (with meals). Historical Provider, MD       Current medications:    No current facility-administered medications for this visit. No current outpatient medications on file.      Facility-Administered Medications Ordered in Other Visits   Medication Dose Route Frequency Provider Last Rate Last Dose    bisacodyl (DULCOLAX) EC tablet 5 mg  5 mg Oral Daily PRN Chris Merrill DO        gabapentin (NEURONTIN) capsule 300 mg  300 mg Oral BID Tejal Darter, DO   300 mg at 19    glimepiride (AMARYL) tablet 2 mg  2 mg Oral QAM AC Tejal Darfrancisco javier, DO   Stopped at 19 0700    lisinopril (PRINIVIL;ZESTRIL) tablet 20 mg  20 mg Oral Daily Tejal Darter, DO   20 mg at 19 8004    linagliptin (TRADJENTA) tablet 5 mg  5 mg Oral Daily Tejal Darter, DO   5 mg at 19 1134    ondansetron (ZOFRAN) injection 4 mg  4 mg Intravenous Q6H PRN Tejal Srivastava, DO   4 mg at 19 2240    insulin lispro (HUMALOG)

## 2019-07-20 LAB
ANION GAP SERPL CALCULATED.3IONS-SCNC: 12 MMOL/L (ref 7–16)
BLOOD BANK DISPENSE STATUS: NORMAL
BLOOD BANK PRODUCT CODE: NORMAL
BPU ID: NORMAL
BUN BLDV-MCNC: 12 MG/DL (ref 8–23)
CALCIUM SERPL-MCNC: 8.3 MG/DL (ref 8.6–10.2)
CHLORIDE BLD-SCNC: 113 MMOL/L (ref 98–107)
CO2: 15 MMOL/L (ref 22–29)
CREAT SERPL-MCNC: 1.1 MG/DL (ref 0.5–1)
DESCRIPTION BLOOD BANK: NORMAL
GFR AFRICAN AMERICAN: >60
GFR NON-AFRICAN AMERICAN: 50 ML/MIN/1.73
GLUCOSE BLD-MCNC: 149 MG/DL (ref 74–99)
HCT VFR BLD CALC: 25.4 % (ref 34–48)
HEMOGLOBIN: 8.1 G/DL (ref 11.5–15.5)
METER GLUCOSE: 116 MG/DL (ref 74–99)
METER GLUCOSE: 145 MG/DL (ref 74–99)
METER GLUCOSE: 157 MG/DL (ref 74–99)
METER GLUCOSE: 84 MG/DL (ref 74–99)
POTASSIUM SERPL-SCNC: 3.5 MMOL/L (ref 3.5–5)
SODIUM BLD-SCNC: 140 MMOL/L (ref 132–146)

## 2019-07-20 PROCEDURE — 2580000003 HC RX 258: Performed by: HOSPITALIST

## 2019-07-20 PROCEDURE — 36415 COLL VENOUS BLD VENIPUNCTURE: CPT

## 2019-07-20 PROCEDURE — 6360000002 HC RX W HCPCS: Performed by: HOSPITALIST

## 2019-07-20 PROCEDURE — C9113 INJ PANTOPRAZOLE SODIUM, VIA: HCPCS | Performed by: HOSPITALIST

## 2019-07-20 PROCEDURE — 80048 BASIC METABOLIC PNL TOTAL CA: CPT

## 2019-07-20 PROCEDURE — 6370000000 HC RX 637 (ALT 250 FOR IP): Performed by: GENERAL PRACTICE

## 2019-07-20 PROCEDURE — 85014 HEMATOCRIT: CPT

## 2019-07-20 PROCEDURE — 2060000000 HC ICU INTERMEDIATE R&B

## 2019-07-20 PROCEDURE — 82962 GLUCOSE BLOOD TEST: CPT

## 2019-07-20 PROCEDURE — 85018 HEMOGLOBIN: CPT

## 2019-07-20 RX ORDER — PANTOPRAZOLE SODIUM 40 MG/1
40 TABLET, DELAYED RELEASE ORAL
Status: DISCONTINUED | OUTPATIENT
Start: 2019-07-20 | End: 2019-07-21 | Stop reason: HOSPADM

## 2019-07-20 RX ORDER — SUCRALFATE 1 G/1
1 TABLET ORAL EVERY 6 HOURS SCHEDULED
Status: DISCONTINUED | OUTPATIENT
Start: 2019-07-20 | End: 2019-07-21 | Stop reason: HOSPADM

## 2019-07-20 RX ORDER — FERROUS SULFATE 325(65) MG
325 TABLET ORAL 2 TIMES DAILY WITH MEALS
Status: DISCONTINUED | OUTPATIENT
Start: 2019-07-20 | End: 2019-07-21 | Stop reason: HOSPADM

## 2019-07-20 RX ORDER — PANTOPRAZOLE SODIUM 40 MG/1
40 TABLET, DELAYED RELEASE ORAL
Qty: 180 TABLET | Refills: 1 | Status: SHIPPED | OUTPATIENT
Start: 2019-07-20

## 2019-07-20 RX ORDER — SUCRALFATE ORAL 1 G/10ML
1 SUSPENSION ORAL EVERY 6 HOURS SCHEDULED
Status: DISCONTINUED | OUTPATIENT
Start: 2019-07-20 | End: 2019-07-20 | Stop reason: CLARIF

## 2019-07-20 RX ADMIN — FERROUS SULFATE TAB 325 MG (65 MG ELEMENTAL FE) 325 MG: 325 (65 FE) TAB at 16:33

## 2019-07-20 RX ADMIN — METFORMIN HYDROCHLORIDE 500 MG: 500 TABLET ORAL at 08:07

## 2019-07-20 RX ADMIN — PANTOPRAZOLE SODIUM 8 MG/HR: 40 INJECTION, POWDER, FOR SOLUTION INTRAVENOUS at 04:12

## 2019-07-20 RX ADMIN — LISINOPRIL 20 MG: 20 TABLET ORAL at 08:08

## 2019-07-20 RX ADMIN — HYDROCHLOROTHIAZIDE 6.25 MG: 12.5 TABLET ORAL at 08:08

## 2019-07-20 RX ADMIN — SUCRALFATE 1 G: 1 TABLET ORAL at 23:56

## 2019-07-20 RX ADMIN — FERROUS SULFATE TAB 325 MG (65 MG ELEMENTAL FE) 325 MG: 325 (65 FE) TAB at 13:31

## 2019-07-20 RX ADMIN — METOPROLOL TARTRATE 50 MG: 50 TABLET ORAL at 21:29

## 2019-07-20 RX ADMIN — PANTOPRAZOLE SODIUM 40 MG: 40 TABLET, DELAYED RELEASE ORAL at 16:33

## 2019-07-20 RX ADMIN — METFORMIN HYDROCHLORIDE 500 MG: 500 TABLET ORAL at 16:33

## 2019-07-20 RX ADMIN — SUCRALFATE 1 G: 1 TABLET ORAL at 13:31

## 2019-07-20 RX ADMIN — METOPROLOL TARTRATE 50 MG: 50 TABLET ORAL at 08:08

## 2019-07-20 RX ADMIN — LINAGLIPTIN 5 MG: 5 TABLET, FILM COATED ORAL at 08:08

## 2019-07-20 RX ADMIN — GABAPENTIN 300 MG: 300 CAPSULE ORAL at 08:08

## 2019-07-20 RX ADMIN — GLIMEPIRIDE 2 MG: 2 TABLET ORAL at 06:27

## 2019-07-20 RX ADMIN — GABAPENTIN 300 MG: 300 CAPSULE ORAL at 21:29

## 2019-07-20 RX ADMIN — SUCRALFATE 1 G: 1 TABLET ORAL at 16:33

## 2019-07-20 ASSESSMENT — PAIN SCALES - GENERAL
PAINLEVEL_OUTOF10: 0

## 2019-07-21 VITALS
WEIGHT: 179.44 LBS | OXYGEN SATURATION: 99 % | SYSTOLIC BLOOD PRESSURE: 113 MMHG | HEART RATE: 86 BPM | TEMPERATURE: 98.6 F | HEIGHT: 61 IN | RESPIRATION RATE: 16 BRPM | DIASTOLIC BLOOD PRESSURE: 66 MMHG | BODY MASS INDEX: 33.88 KG/M2

## 2019-07-21 LAB
HCT VFR BLD CALC: 25.8 % (ref 34–48)
HEMOGLOBIN: 8.3 G/DL (ref 11.5–15.5)
METER GLUCOSE: 108 MG/DL (ref 74–99)
METER GLUCOSE: 192 MG/DL (ref 74–99)

## 2019-07-21 PROCEDURE — 6370000000 HC RX 637 (ALT 250 FOR IP): Performed by: GENERAL PRACTICE

## 2019-07-21 PROCEDURE — 36415 COLL VENOUS BLD VENIPUNCTURE: CPT

## 2019-07-21 PROCEDURE — 82962 GLUCOSE BLOOD TEST: CPT

## 2019-07-21 PROCEDURE — 85014 HEMATOCRIT: CPT

## 2019-07-21 PROCEDURE — 85018 HEMOGLOBIN: CPT

## 2019-07-21 RX ORDER — SUCRALFATE 1 G/1
1 TABLET ORAL 4 TIMES DAILY
Qty: 120 TABLET | Refills: 3 | Status: SHIPPED | OUTPATIENT
Start: 2019-07-21

## 2019-07-21 RX ORDER — PANTOPRAZOLE SODIUM 40 MG/1
40 TABLET, DELAYED RELEASE ORAL
Qty: 30 TABLET | Refills: 3 | Status: SHIPPED | OUTPATIENT
Start: 2019-07-21

## 2019-07-21 RX ORDER — FERROUS SULFATE 325(65) MG
325 TABLET ORAL 2 TIMES DAILY WITH MEALS
Qty: 30 TABLET | Refills: 3 | Status: SHIPPED | OUTPATIENT
Start: 2019-07-21

## 2019-07-21 RX ADMIN — LINAGLIPTIN 5 MG: 5 TABLET, FILM COATED ORAL at 08:04

## 2019-07-21 RX ADMIN — GLIMEPIRIDE 2 MG: 2 TABLET ORAL at 06:47

## 2019-07-21 RX ADMIN — PANTOPRAZOLE SODIUM 40 MG: 40 TABLET, DELAYED RELEASE ORAL at 06:47

## 2019-07-21 RX ADMIN — LISINOPRIL 20 MG: 20 TABLET ORAL at 08:04

## 2019-07-21 RX ADMIN — FERROUS SULFATE TAB 325 MG (65 MG ELEMENTAL FE) 325 MG: 325 (65 FE) TAB at 08:04

## 2019-07-21 RX ADMIN — GABAPENTIN 300 MG: 300 CAPSULE ORAL at 08:04

## 2019-07-21 RX ADMIN — METOPROLOL TARTRATE 50 MG: 50 TABLET ORAL at 08:04

## 2019-07-21 RX ADMIN — HYDROCHLOROTHIAZIDE 6.25 MG: 12.5 TABLET ORAL at 08:04

## 2019-07-21 RX ADMIN — METFORMIN HYDROCHLORIDE 500 MG: 500 TABLET ORAL at 08:04

## 2019-07-21 RX ADMIN — SUCRALFATE 1 G: 1 TABLET ORAL at 06:47

## 2019-07-21 ASSESSMENT — PAIN SCALES - GENERAL
PAINLEVEL_OUTOF10: 0
PAINLEVEL_OUTOF10: 0

## 2019-07-21 NOTE — PROGRESS NOTES
24hr chart check  Cindy Diaz
Attempted to obtain blood work but patient refused lab draw in [facility] [place], operated by [facility] because everyone has been poking her there, no other potential veins seen for lab draw will put in for IV team since phlebotomy isn't here
Immediately prior to the procedure the patient's History and Physical was reviewed- there are no changes with the current vitals. BP (!) 111/58   Pulse 78   Temp 98.5 °F (36.9 °C) (Oral)   Resp 17   Ht 5' 1\" (1.549 m)   Wt 173 lb 1.6 oz (78.5 kg)   SpO2 99%   BMI 32.71 kg/m²     Pt had EGD 7/16 with duodenal ulcer clipped and treated with BICAP for visible vessel. Pt having continued dark stools and significant drop in HG. Currently PRBC hanging. No CP/SOB. Plan for emergent repeat EGD to reassess ulcer. Bleeding scan negative earlier today but likely intermittent bleeding.       DO Ilan  7/18/2019  4:21 PM
Patient seen doing well. No further bleeding. c scope and repeat egd no bleeding. Advance diet.  Likely dc in am
Patient seen feels well. Normal bm. No further bleeding.  Tolerating food
hold  -Continue PPI gtt - if stable hgb, will transition to PPI BID  -Negative nuclear medicine bleeding scan  -Colonoscopy 7/19/2019 with no bleeding source identified    2. CRC screening  -Colonoscopy 7/19/2019 with descending and sigmoid colon diverticulosis and internal hemorrhoids - no other acute findings      Pending hgb check. Patient has been refusing labs since yesterday. Per nursing, IV team to draw blood this morning. If hgb stable, ok to increase to soft diet from GI POV.   Will follow.       Mery Kings, APRN - CNP  7/20/2019  6:22 AM

## 2019-07-21 NOTE — PLAN OF CARE
Problem: Infection:  Goal: Will remain free from infection  Description  Will remain free from infection  Outcome: Met This Shift     Problem: Pain:  Goal: Control of acute pain  Description  Control of acute pain  Outcome: Met This Shift  Goal: Control of chronic pain  Description  Control of chronic pain  Outcome: Met This Shift     Problem: Bleeding:  Goal: Will show no signs and symptoms of excessive bleeding  Description  Will show no signs and symptoms of excessive bleeding  Outcome: Met This Shift

## 2022-01-01 NOTE — H&P
[address] [address]                              HISTORY AND PHYSICAL    PATIENT NAME: Kofi Malave                     :        1955  MED REC NO:   [id number]                            ROOM:       [id number]  ACCOUNT NO:   [de-identified]                           ADMIT DATE: 2019  PROVIDER:     Gucci Yoon DO    HISTORY OF PRESENT ILLNESS:  The patient is a 59-year-old white female  well known to me being seen and treated for diabetes and hypertension,  pretty much noncompliant with her medications. States that over the  last week or so, she has been noticing dark black stools. Did not think  too much about, thought it was due to the fruit juices and punch that  she was drinking; however, she became nauseated on the day prior to  admission and vomited, coffee-ground emesis and some blood clots. She  came into the emergency room and was found to be significantly anemic,  had hematemesis and positive stool guaiac for blood. She was  subsequently admitted with upper GI bleed and Gastroenterology  consultation and scheduled for EGD. RECENT AND CURRENT MEDICATIONS:  Include Glucophage, lisinopril,  bisoprolol/hydrochlorothiazide 10/6.25, Januvia, gabapentin, and  glimepiride. PAST MEDICAL HISTORY:  Again significant for hypertension and diabetes  mellitus. PAST SURGICAL HISTORY:  Consistent with knee surgery, fracture surgery,  carpal tunnel release, and appendectomy. ALLERGIES:  She has no known allergies. SOCIAL HISTORY:  She is a nonsmoker and nondrinker and denies the use of  narcotic drugs. FAMILY HISTORY:  Diabetes and heart disease run in the family. REVIEW OF SYSTEMS:  Positive for lightheadedness. Negative for vertigo  or cephalgia. No ringing in the ears, hearing loss, difficulty  swallowing, hoarseness in her voice, visual disturbances, or bloody  noses.   There is no chest pain,
Immediately prior to the procedure the patient's History and Physical was reviewed- there are no changes with the current vitals. Blood pressure 121/65, pulse 82, temperature 97.8 °F (36.6 °C), temperature source Oral, resp. rate 16, height 5' 1\" (1.549 m), weight 176 lb 11.2 oz (80.2 kg), SpO2 99 %, not currently breastfeeding.
Ingrid

## 2022-07-27 ENCOUNTER — HOSPITAL ENCOUNTER (OUTPATIENT)
Age: 67
Discharge: HOME | End: 2022-07-29

## 2022-07-27 LAB
ABO/RH: NORMAL
ANION GAP SERPL CALCULATED.3IONS-SCNC: 14 MMOL/L (ref 7–16)
ANTIBODY IDENTIFICATION: NORMAL
ANTIBODY SCREEN: NORMAL
BUN BLDV-MCNC: 49 MG/DL (ref 6–23)
CALCIUM SERPL-MCNC: 10.5 MG/DL (ref 8.6–10.2)
CHLORIDE BLD-SCNC: 104 MMOL/L (ref 98–107)
CO2: 21 MMOL/L (ref 22–29)
CREAT SERPL-MCNC: 1.1 MG/DL (ref 0.5–1)
DAT POLYSPECIFIC: NORMAL
GFR AFRICAN AMERICAN: 60
GFR NON-AFRICAN AMERICAN: 50 ML/MIN/1.73
GLUCOSE BLD-MCNC: 102 MG/DL (ref 74–99)
POTASSIUM SERPL-SCNC: 4 MMOL/L (ref 3.5–5)
SODIUM BLD-SCNC: 139 MMOL/L (ref 132–146)

## 2022-07-27 PROCEDURE — 86870 RBC ANTIBODY IDENTIFICATION: CPT

## 2022-07-27 PROCEDURE — 86850 RBC ANTIBODY SCREEN: CPT

## 2022-07-27 PROCEDURE — 86900 BLOOD TYPING SEROLOGIC ABO: CPT

## 2022-07-27 PROCEDURE — 87081 CULTURE SCREEN ONLY: CPT

## 2022-07-27 PROCEDURE — 80048 BASIC METABOLIC PNL TOTAL CA: CPT

## 2022-07-27 PROCEDURE — 86880 COOMBS TEST DIRECT: CPT

## 2022-07-27 PROCEDURE — 86901 BLOOD TYPING SEROLOGIC RH(D): CPT

## 2022-07-29 LAB — MRSA CULTURE ONLY: NORMAL

## 2022-08-02 ENCOUNTER — OFFICE VISIT (OUTPATIENT)
Dept: PODIATRY | Age: 67
End: 2022-08-02
Payer: MEDICARE

## 2022-08-02 DIAGNOSIS — M10.072 ACUTE IDIOPATHIC GOUT OF LEFT FOOT: Primary | ICD-10-CM

## 2022-08-02 DIAGNOSIS — M10.072 ACUTE IDIOPATHIC GOUT OF LEFT FOOT: ICD-10-CM

## 2022-08-02 DIAGNOSIS — M20.42 HAMMER TOE OF LEFT FOOT: ICD-10-CM

## 2022-08-02 LAB — URIC ACID, SERUM: 7.4 MG/DL (ref 2.4–5.7)

## 2022-08-02 PROCEDURE — 99204 OFFICE O/P NEW MOD 45 MIN: CPT | Performed by: PODIATRIST

## 2022-08-02 PROCEDURE — 1123F ACP DISCUSS/DSCN MKR DOCD: CPT | Performed by: PODIATRIST

## 2022-08-02 RX ORDER — AMMONIUM LACTATE 12 G/100G
LOTION TOPICAL
Qty: 400 G | Refills: 2 | Status: SHIPPED | OUTPATIENT
Start: 2022-08-02

## 2022-08-02 RX ORDER — COLCHICINE 0.6 MG/1
0.6 TABLET ORAL DAILY
Qty: 3 TABLET | Refills: 0 | Status: SHIPPED | OUTPATIENT
Start: 2022-08-02

## 2022-08-02 NOTE — PROGRESS NOTES
New patient in office with c/o ulcer to third toe left foot. Sx  x several months. No complaints of pain at this time. Patient was scheduled for knee surgery with Dr Carlitos Bailey Monday which has been canceled due to toe. Xrays of left foot were obtained at [facility]. Aj Goodrich : 1955 Sex: female  Age: 77 y.o. Patient was referred by Cristin Dillon DO    CC:    Gout left third toe    HPI:   This pleasant 59-year-old female patient referred me today for likely gout left third toe. Was previously scheduled for knee surgery was canceled due to possible wound of the third toe on left foot. No previous treatment with gout. Did have radiographs at [facility] recently left foot. Denies previous treatment with allopurinol. Does have history of gabapentin. Denies any drainage or any redness left third toe. No pain today. Denies previous treatment with colchicine. No additional pedal complaints at this time.     ROS:  Const: Denies constitutional symptoms  Musculo: Denies symptoms other than stated above  Skin: Denies symptoms other than stated above       Current Outpatient Medications:     colchicine (COLCRYS) 0.6 MG tablet, Take 1 tablet by mouth in the morning., Disp: 3 tablet, Rfl: 0    ammonium lactate (LAC-HYDRIN) 12 % lotion, Apply topically twice daily, Disp: 400 g, Rfl: 2    ferrous sulfate 325 (65 Fe) MG tablet, Take 1 tablet by mouth 2 times daily (with meals), Disp: 30 tablet, Rfl: 3    pantoprazole (PROTONIX) 40 MG tablet, Take 1 tablet by mouth 2 times daily (before meals), Disp: 30 tablet, Rfl: 3    sucralfate (CARAFATE) 1 GM tablet, Take 1 tablet by mouth 4 times daily, Disp: 120 tablet, Rfl: 3    pantoprazole (PROTONIX) 40 MG tablet, Take 1 tablet by mouth 2 times daily (before meals), Disp: 180 tablet, Rfl: 1    glimepiride (AMARYL) 2 MG tablet, Take 2 mg by mouth every morning (before breakfast), Disp: , Rfl:     gabapentin (NEURONTIN) 300 MG capsule, Take 300 mg by mouth 2 times daily. , Disp: , Rfl:     SITagliptin (JANUVIA) 50 MG tablet, Take 50 mg by mouth daily, Disp: , Rfl:     bisoprolol-hydrochlorothiazide (ZIAC) 10-6.25 MG per tablet, Take 1 tablet by mouth daily, Disp: , Rfl:     lisinopril (PRINIVIL;ZESTRIL) 20 MG tablet, Take 20 mg by mouth daily, Disp: , Rfl:     metformin (GLUCOPHAGE) 500 MG tablet, Take 500 mg by mouth 2 times daily (with meals). , Disp: , Rfl:   No Known Allergies    Past Medical History:   Diagnosis Date    Diabetes mellitus ([facility] [address]     poorly controlled    Hypertension            There were no vitals filed for this visit. Work History/Social History: Foot and ankle history:     Focused Lower Extremity Physical Exam:    Neurovascular examination:    Dorsalis Pedis palpable bilateral.  Posterior tibialis palpable bilateral.    Capillary Refill Time:  Immediate return  Hair growth:  Symmetrical and bilateral   Skin:  Not atrophic  Edema: No edema bilateral feet or ankles. Neurologic:  Light touch intact bilateral.      Musculoskeletal/ Orthopedic examination:    Equinis: Absent bilateral  Dorsiflexion, plantarflexion, inversion, eversion bilateral 5 out of 5 muscle strength  Wiggling toes  Negative Homans  No pain third digit. Flexible hammertoe second and third digit left foot    Dermatology examination:    Dried scab distal third digit with no erythema noted. Mild edema left third digit. Likely gouty tophi surrounding the scab third digit with no purulence. No drainage. Dry skin noted. Assessment and Plan:  Lala Flores was seen today for wound check. Diagnoses and all orders for this visit:    Acute idiopathic gout of left foot  -     Uric Acid; Future    Other orders  -     colchicine (COLCRYS) 0.6 MG tablet; Take 1 tablet by mouth in the morning.  -     ammonium lactate (LAC-HYDRIN) 12 % lotion; Apply topically twice daily        Radiographs [facility] left foot.   PROCEDURE: XR foot LT 3 view         CLINICAL INDICATION: Pain COMPARISON: None         FINDINGS:    No fracture or dislocation. Degenerative change first metatarsal phalangeal joint with bony erosion distal first metatarsal and a adjacent soft tissue swelling. Deformity fifth metatarsal most likely healed fracture. Mild calcaneal spurring. Mild degenerative change of the phalangeal joints with hammertoe deformities. IMPRESSION:    Mild degenerative osteoarthritis. There is also degenerative change with bony erosion and soft tissue swelling first metatarsal phalangeal joint possibly gouty arthritis. Electronically signed by: BRIONNA Saldaña M.D. Date and Time signed: 7/27/2022 10:28:58 AM      New referral idiopathic gout left foot  I did review radi[place] [facility] [place]. Clinically does present as gout third digit. I did order uric acid testing. 7.4 today. Colchicine take as directed. Ammonium lactate 12% twice daily both feet. Offloading padding recommended. We will follow-up in office 2 weeks. We did discuss long-term allopurinol. I will follow-up in 2 weeks to monitor progression. Return in about 2 weeks (around 8/16/2022). Seen By:  Mikayla Calderon DPM      Document was created using voice recognition software. Note was reviewed, however may contain grammatical errors.

## 2022-08-16 ENCOUNTER — PROCEDURE VISIT (OUTPATIENT)
Dept: PODIATRY | Age: 67
End: 2022-08-16
Payer: MEDICARE

## 2022-08-16 DIAGNOSIS — M20.42 HAMMER TOE OF LEFT FOOT: ICD-10-CM

## 2022-08-16 DIAGNOSIS — M10.072 ACUTE IDIOPATHIC GOUT OF LEFT FOOT: Primary | ICD-10-CM

## 2022-08-16 PROCEDURE — 99213 OFFICE O/P EST LOW 20 MIN: CPT | Performed by: PODIATRIST

## 2022-08-16 PROCEDURE — 1123F ACP DISCUSS/DSCN MKR DOCD: CPT | Performed by: PODIATRIST

## 2022-08-16 NOTE — PROGRESS NOTES
(63 kg)   Height: 5' 1\" (1.549 m)       Work History/Social History: Foot and ankle history:     Focused Lower Extremity Physical Exam:    Neurovascular examination:    Dorsalis Pedis palpable bilateral.  Posterior tibialis palpable bilateral.    Capillary Refill Time:  Immediate return  Hair growth:  Symmetrical and bilateral   Skin:  Not atrophic  Edema: No edema bilateral feet or ankles. Neurologic:  Light touch intact bilateral.      Musculoskeletal/ Orthopedic examination:    Equinis: Absent bilateral  Dorsiflexion, plantarflexion, inversion, eversion bilateral 5 out of 5 muscle strength  Wiggling toes  Negative Homans  No pain third digit. Flexible hammertoe second third digit left foot    Dermatology examination:    Dried scab distal third digit with tophi noted. No erythema no open wound. Assessment and Plan:  Inés Alvarez was seen today for follow-up and toe pain. Diagnoses and all orders for this visit:    Acute idiopathic gout of left foot    Hammer toe of left foot        Radiographs [place] left foot. PROCEDURE: XR foot LT 3 view    No fracture or dislocation. Degenerative change first metatarsal phalangeal joint with bony erosion distal first metatarsal and a adjacent soft tissue swelling. Deformity fifth metatarsal most likely healed fracture. Mild calcaneal spurring. Mild degenerative change of the phalangeal joints with hammertoe deformities. IMPRESSION:    Mild degenerative osteoarthritis. There is also degenerative change with bony erosion and soft tissue swelling first metatarsal phalangeal joint possibly gouty arthritis. Follow-up gout third digit left foot. No open or draining wounds noted today. Uric acid testing from 8/2/2022 discussed again today. 7.4. We did discuss importance of following up with her primary care physician Dr. Geremias Thao to discuss long-term allopurinol for gout. Did recently see hand specialist Dr. Jeanette Luna for gout of her hand.   Appreciate his input. Does have upcoming appointment with Dr. Carlitos Bailey and his team from orthopedic standpoint. Appreciate their input. I did recommend continue ammonium lactate 12% lotion both feet. I will follow-up from clinical standpoint 3 months. Return in about 3 months (around 2022). Seen By:  Belva Apgar, DPM      Document was created using voice recognition software. Note was reviewed, however may contain grammatical errors.

## 2022-08-17 VITALS — BODY MASS INDEX: 26.24 KG/M2 | HEIGHT: 61 IN | WEIGHT: 139 LBS

## 2022-08-29 ENCOUNTER — TELEPHONE (OUTPATIENT)
Dept: PODIATRY | Age: 67
End: 2022-08-29

## 2022-08-29 RX ORDER — ALLOPURINOL 100 MG/1
100 TABLET ORAL DAILY
Qty: 60 TABLET | Refills: 0 | Status: SHIPPED
Start: 2022-08-29 | End: 2022-09-29 | Stop reason: SDUPTHER

## 2022-09-27 ENCOUNTER — OFFICE VISIT (OUTPATIENT)
Dept: PODIATRY | Age: 67
End: 2022-09-27
Payer: MEDICARE

## 2022-09-27 VITALS — HEIGHT: 61 IN | BODY MASS INDEX: 26.24 KG/M2 | WEIGHT: 139 LBS

## 2022-09-27 DIAGNOSIS — M10.072 ACUTE IDIOPATHIC GOUT OF LEFT FOOT: Primary | ICD-10-CM

## 2022-09-27 DIAGNOSIS — M20.42 HAMMER TOE OF LEFT FOOT: ICD-10-CM

## 2022-09-27 PROCEDURE — G8400 PT W/DXA NO RESULTS DOC: HCPCS | Performed by: PODIATRIST

## 2022-09-27 PROCEDURE — 1090F PRES/ABSN URINE INCON ASSESS: CPT | Performed by: PODIATRIST

## 2022-09-27 PROCEDURE — 3017F COLORECTAL CA SCREEN DOC REV: CPT | Performed by: PODIATRIST

## 2022-09-27 PROCEDURE — G8417 CALC BMI ABV UP PARAM F/U: HCPCS | Performed by: PODIATRIST

## 2022-09-27 PROCEDURE — 1123F ACP DISCUSS/DSCN MKR DOCD: CPT | Performed by: PODIATRIST

## 2022-09-27 PROCEDURE — G8428 CUR MEDS NOT DOCUMENT: HCPCS | Performed by: PODIATRIST

## 2022-09-27 PROCEDURE — 1036F TOBACCO NON-USER: CPT | Performed by: PODIATRIST

## 2022-09-27 PROCEDURE — 99213 OFFICE O/P EST LOW 20 MIN: CPT | Performed by: PODIATRIST

## 2022-09-27 NOTE — PROGRESS NOTES
Patient in office to follow up with left foot pain. No complaints at this time. CC:    Follow-up gout left foot    HPI:   Presents today follow-up gout left foot  Has been tolerating allopurinol 100 mg daily with no recent gout flares. Denies any foot pain. Denies any open skin lesions. She is scheduled for knee replacement next month at [facility] with Dr. Morena Wesley. Denies any additional foot or ankle issues at this time. No pedal complaints today. ROS:  Const: Denies constitutional symptoms  Musculo: Denies symptoms other than stated above  Skin: Denies symptoms other than stated above       Current Outpatient Medications:     allopurinol (ZYLOPRIM) 100 MG tablet, Take 1 tablet by mouth daily, Disp: 60 tablet, Rfl: 0    colchicine (COLCRYS) 0.6 MG tablet, Take 1 tablet by mouth in the morning., Disp: 3 tablet, Rfl: 0    ammonium lactate (LAC-HYDRIN) 12 % lotion, Apply topically twice daily, Disp: 400 g, Rfl: 2    ferrous sulfate 325 (65 Fe) MG tablet, Take 1 tablet by mouth 2 times daily (with meals), Disp: 30 tablet, Rfl: 3    pantoprazole (PROTONIX) 40 MG tablet, Take 1 tablet by mouth 2 times daily (before meals), Disp: 30 tablet, Rfl: 3    sucralfate (CARAFATE) 1 GM tablet, Take 1 tablet by mouth 4 times daily, Disp: 120 tablet, Rfl: 3    pantoprazole (PROTONIX) 40 MG tablet, Take 1 tablet by mouth 2 times daily (before meals), Disp: 180 tablet, Rfl: 1    glimepiride (AMARYL) 2 MG tablet, Take 2 mg by mouth every morning (before breakfast), Disp: , Rfl:     gabapentin (NEURONTIN) 300 MG capsule, Take 300 mg by mouth 2 times daily. , Disp: , Rfl:     SITagliptin (JANUVIA) 50 MG tablet, Take 50 mg by mouth daily, Disp: , Rfl:     bisoprolol-hydrochlorothiazide (ZIAC) 10-6.25 MG per tablet, Take 1 tablet by mouth daily, Disp: , Rfl:     lisinopril (PRINIVIL;ZESTRIL) 20 MG tablet, Take 20 mg by mouth daily, Disp: , Rfl:     metformin (GLUCOPHAGE) 500 MG tablet, Take 500 mg by mouth 2 times daily (with meals). , Disp: , Rfl:   No Known Allergies    Past Medical History:   Diagnosis Date    Diabetes mellitus ([facility] [address]     poorly controlled    Hypertension            Vitals:    09/27/22 1038   Weight: 139 lb (63 kg)   Height: 5' 1\" (1.549 m)       Work History/Social History: Foot and ankle history:     Focused Lower Extremity Physical Exam:    Neurovascular examination:    Dorsalis Pedis palpable bilateral.  Posterior tibialis palpable bilateral.    Capillary Refill Time:  Immediate return  Hair growth:  Symmetrical and bilateral   Skin:  Not atrophic  Edema: No edema bilateral feet or ankles. Neurologic:  Light touch intact bilateral.      Musculoskeletal/ Orthopedic examination:    Equinis: Absent bilateral  Dorsiflexion, plantarflexion, inversion, eversion bilateral 5 out of 5 muscle strength  Wiggling toes  Negative Homans  No pain third digit left foot. Dermatology examination:    No open skin lesions or abrasions third toe. No open skin lesions or abrasions foot or ankle      Assessment and Plan:  Josse Alan was seen today for follow-up. Diagnoses and all orders for this visit:    Acute idiopathic gout of left foot    Hammer toe of left foot        Radiographs [place] left foot. PROCEDURE: XR foot LT 3 view    No fracture or dislocation. Degenerative change first metatarsal phalangeal joint with bony erosion distal first metatarsal and a adjacent soft tissue swelling. Deformity fifth metatarsal most likely healed fracture. Mild calcaneal spurring. Mild degenerative change of the phalangeal joints with hammertoe deformities. IMPRESSION:    Mild degenerative osteoarthritis. There is also degenerative change with bony erosion and soft tissue swelling first metatarsal phalangeal joint possibly gouty arthritis. Follow-up gout left foot    Currently on allopurinol. From foot and ankle standpoint and gout control I would follow-up again 3 months to monitor progression.   Most recent uric acid testing from 8/2/2022. 7.4. She is scheduled to have a knee replacement upcoming next month. I we will follow-up in 3 months for repeat uric acid testing. Has been tolerating allopurinol well and no open wounds today. Any new skin lesions or abrasions I be happy to see her back sooner. Return in about 3 months (around 12/27/2022). Seen By:  Laci Cassidy DPM      Document was created using voice recognition software. Note was reviewed, however may contain grammatical errors.

## 2022-09-29 ENCOUNTER — TELEPHONE (OUTPATIENT)
Dept: PODIATRY | Age: 67
End: 2022-09-29

## 2022-09-29 RX ORDER — ALLOPURINOL 100 MG/1
100 TABLET ORAL DAILY
Qty: 60 TABLET | Refills: 1 | Status: SHIPPED | OUTPATIENT
Start: 2022-09-29

## 2022-09-29 NOTE — TELEPHONE ENCOUNTER
Patient called requesting refill on allopurinol to be sent to [facility] in [place].

## 2022-10-24 ENCOUNTER — HOSPITAL ENCOUNTER (OUTPATIENT)
Age: 67
Discharge: HOME | End: 2022-10-26

## 2022-10-24 LAB
ABO/RH: NORMAL
ANION GAP SERPL CALCULATED.3IONS-SCNC: 12 MMOL/L (ref 7–16)
ANTIBODY IDENTIFICATION: NORMAL
ANTIBODY SCREEN: NORMAL
BUN BLDV-MCNC: 38 MG/DL (ref 6–23)
CALCIUM SERPL-MCNC: 9.7 MG/DL (ref 8.6–10.2)
CHLORIDE BLD-SCNC: 105 MMOL/L (ref 98–107)
CO2: 19 MMOL/L (ref 22–29)
CREAT SERPL-MCNC: 1.2 MG/DL (ref 0.5–1)
DAT POLYSPECIFIC: NORMAL
GFR SERPL CREATININE-BSD FRML MDRD: 50 ML/MIN/1.73
GLUCOSE BLD-MCNC: 81 MG/DL (ref 74–99)
HBA1C MFR BLD: 4.7 % (ref 4–5.6)
HCT VFR BLD CALC: 33.5 % (ref 34–48)
HEMOGLOBIN: 10.9 G/DL (ref 11.5–15.5)
MCH RBC QN AUTO: 30.8 PG (ref 26–35)
MCHC RBC AUTO-ENTMCNC: 32.5 % (ref 32–34.5)
MCV RBC AUTO: 94.6 FL (ref 80–99.9)
PDW BLD-RTO: 15.2 FL (ref 11.5–15)
PLATELET # BLD: 195 E9/L (ref 130–450)
PMV BLD AUTO: 9.7 FL (ref 7–12)
POTASSIUM SERPL-SCNC: 3.9 MMOL/L (ref 3.5–5)
RBC # BLD: 3.54 E12/L (ref 3.5–5.5)
SODIUM BLD-SCNC: 136 MMOL/L (ref 132–146)
WBC # BLD: 5.1 E9/L (ref 4.5–11.5)

## 2022-10-24 PROCEDURE — 86850 RBC ANTIBODY SCREEN: CPT

## 2022-10-24 PROCEDURE — 86900 BLOOD TYPING SEROLOGIC ABO: CPT

## 2022-10-24 PROCEDURE — 83036 HEMOGLOBIN GLYCOSYLATED A1C: CPT

## 2022-10-24 PROCEDURE — 86901 BLOOD TYPING SEROLOGIC RH(D): CPT

## 2022-10-24 PROCEDURE — 86870 RBC ANTIBODY IDENTIFICATION: CPT

## 2022-10-24 PROCEDURE — 86922 COMPATIBILITY TEST ANTIGLOB: CPT

## 2022-10-24 PROCEDURE — 86880 COOMBS TEST DIRECT: CPT

## 2022-10-24 PROCEDURE — 80048 BASIC METABOLIC PNL TOTAL CA: CPT

## 2022-10-24 PROCEDURE — 85027 COMPLETE CBC AUTOMATED: CPT

## 2022-10-25 LAB — DR. NOTIFY: NORMAL

## 2022-10-26 ENCOUNTER — HOSPITAL ENCOUNTER (OUTPATIENT)
Age: 67
Discharge: HOME | End: 2022-10-28

## 2022-10-26 LAB
ANION GAP SERPL CALCULATED.3IONS-SCNC: 12 MMOL/L (ref 7–16)
BUN BLDV-MCNC: 28 MG/DL (ref 6–23)
CALCIUM SERPL-MCNC: 8.5 MG/DL (ref 8.6–10.2)
CHLORIDE BLD-SCNC: 108 MMOL/L (ref 98–107)
CO2: 16 MMOL/L (ref 22–29)
CREAT SERPL-MCNC: 0.9 MG/DL (ref 0.5–1)
GFR SERPL CREATININE-BSD FRML MDRD: >60 ML/MIN/1.73
GLUCOSE BLD-MCNC: 211 MG/DL (ref 74–99)
HCT VFR BLD CALC: 30.9 % (ref 34–48)
HEMOGLOBIN: 9.9 G/DL (ref 11.5–15.5)
MCH RBC QN AUTO: 31.2 PG (ref 26–35)
MCHC RBC AUTO-ENTMCNC: 32 % (ref 32–34.5)
MCV RBC AUTO: 97.5 FL (ref 80–99.9)
PDW BLD-RTO: 15 FL (ref 11.5–15)
PLATELET # BLD: 159 E9/L (ref 130–450)
PMV BLD AUTO: 10.2 FL (ref 7–12)
POTASSIUM SERPL-SCNC: 4.3 MMOL/L (ref 3.5–5)
RBC # BLD: 3.17 E12/L (ref 3.5–5.5)
SODIUM BLD-SCNC: 136 MMOL/L (ref 132–146)
WBC # BLD: 7.2 E9/L (ref 4.5–11.5)

## 2022-10-26 PROCEDURE — 85027 COMPLETE CBC AUTOMATED: CPT

## 2022-10-26 PROCEDURE — 80048 BASIC METABOLIC PNL TOTAL CA: CPT

## 2022-10-27 ENCOUNTER — HOSPITAL ENCOUNTER (OUTPATIENT)
Age: 67
Discharge: HOME | End: 2022-10-29

## 2022-10-27 LAB
ANION GAP SERPL CALCULATED.3IONS-SCNC: 11 MMOL/L (ref 7–16)
BUN BLDV-MCNC: 20 MG/DL (ref 6–23)
CALCIUM SERPL-MCNC: 9 MG/DL (ref 8.6–10.2)
CHLORIDE BLD-SCNC: 101 MMOL/L (ref 98–107)
CO2: 17 MMOL/L (ref 22–29)
CREAT SERPL-MCNC: 1 MG/DL (ref 0.5–1)
GFR SERPL CREATININE-BSD FRML MDRD: >60 ML/MIN/1.73
GLUCOSE BLD-MCNC: 256 MG/DL (ref 74–99)
HCT VFR BLD CALC: 26 % (ref 34–48)
HEMOGLOBIN: 8.7 G/DL (ref 11.5–15.5)
MCH RBC QN AUTO: 31.4 PG (ref 26–35)
MCHC RBC AUTO-ENTMCNC: 33.5 % (ref 32–34.5)
MCV RBC AUTO: 93.9 FL (ref 80–99.9)
PDW BLD-RTO: 14.9 FL (ref 11.5–15)
PLATELET # BLD: 162 E9/L (ref 130–450)
PMV BLD AUTO: 10.2 FL (ref 7–12)
POTASSIUM SERPL-SCNC: 4.1 MMOL/L (ref 3.5–5)
RBC # BLD: 2.77 E12/L (ref 3.5–5.5)
SODIUM BLD-SCNC: 129 MMOL/L (ref 132–146)
WBC # BLD: 8.6 E9/L (ref 4.5–11.5)

## 2022-10-27 PROCEDURE — 80048 BASIC METABOLIC PNL TOTAL CA: CPT

## 2022-10-27 PROCEDURE — 85027 COMPLETE CBC AUTOMATED: CPT

## 2022-11-03 LAB
BLOOD BANK DISPENSE STATUS: NORMAL
BLOOD BANK PRODUCT CODE: NORMAL
BPU ID: NORMAL
DESCRIPTION BLOOD BANK: NORMAL

## 2022-12-27 ENCOUNTER — OFFICE VISIT (OUTPATIENT)
Dept: PODIATRY | Age: 67
End: 2022-12-27
Payer: MEDICARE

## 2022-12-27 VITALS — HEIGHT: 61 IN | BODY MASS INDEX: 26.24 KG/M2 | WEIGHT: 139 LBS

## 2022-12-27 DIAGNOSIS — M10.072 ACUTE IDIOPATHIC GOUT OF LEFT FOOT: Primary | ICD-10-CM

## 2022-12-27 DIAGNOSIS — M20.42 HAMMER TOE OF LEFT FOOT: ICD-10-CM

## 2022-12-27 PROCEDURE — 3017F COLORECTAL CA SCREEN DOC REV: CPT | Performed by: PODIATRIST

## 2022-12-27 PROCEDURE — 99213 OFFICE O/P EST LOW 20 MIN: CPT | Performed by: PODIATRIST

## 2022-12-27 PROCEDURE — G8417 CALC BMI ABV UP PARAM F/U: HCPCS | Performed by: PODIATRIST

## 2022-12-27 PROCEDURE — 1036F TOBACCO NON-USER: CPT | Performed by: PODIATRIST

## 2022-12-27 PROCEDURE — G8400 PT W/DXA NO RESULTS DOC: HCPCS | Performed by: PODIATRIST

## 2022-12-27 PROCEDURE — G8427 DOCREV CUR MEDS BY ELIG CLIN: HCPCS | Performed by: PODIATRIST

## 2022-12-27 PROCEDURE — 1090F PRES/ABSN URINE INCON ASSESS: CPT | Performed by: PODIATRIST

## 2022-12-27 PROCEDURE — G8484 FLU IMMUNIZE NO ADMIN: HCPCS | Performed by: PODIATRIST

## 2022-12-27 PROCEDURE — 1123F ACP DISCUSS/DSCN MKR DOCD: CPT | Performed by: PODIATRIST

## 2022-12-27 RX ORDER — ALLOPURINOL 100 MG/1
100 TABLET ORAL DAILY
Qty: 120 TABLET | Refills: 0 | Status: SHIPPED | OUTPATIENT
Start: 2022-12-27 | End: 2023-04-26

## 2022-12-27 NOTE — PROGRESS NOTES
Patient in office to follow up with left foot pain. No complaints of pain at this time. CC:    Follow-up gout left foot    HPI:   Follow-up gout left foot. Recent total knee replacement right knee. Did have recent follow-up with orthopedic team for right knee follow-up. Denies any calf pain today. Tolerating allopurinol well. No recent gout flares. Denies any open wounds foot or ankle related. No additional pedal complaints. ROS:  Const: Denies constitutional symptoms  Musculo: Denies symptoms other than stated above  Skin: Denies symptoms other than stated above       Current Outpatient Medications:     allopurinol (ZYLOPRIM) 100 MG tablet, Take 1 tablet by mouth daily, Disp: 120 tablet, Rfl: 0    allopurinol (ZYLOPRIM) 100 MG tablet, Take 1 tablet by mouth daily, Disp: 60 tablet, Rfl: 1    colchicine (COLCRYS) 0.6 MG tablet, Take 1 tablet by mouth in the morning., Disp: 3 tablet, Rfl: 0    ammonium lactate (LAC-HYDRIN) 12 % lotion, Apply topically twice daily, Disp: 400 g, Rfl: 2    ferrous sulfate 325 (65 Fe) MG tablet, Take 1 tablet by mouth 2 times daily (with meals), Disp: 30 tablet, Rfl: 3    pantoprazole (PROTONIX) 40 MG tablet, Take 1 tablet by mouth 2 times daily (before meals), Disp: 30 tablet, Rfl: 3    sucralfate (CARAFATE) 1 GM tablet, Take 1 tablet by mouth 4 times daily, Disp: 120 tablet, Rfl: 3    pantoprazole (PROTONIX) 40 MG tablet, Take 1 tablet by mouth 2 times daily (before meals), Disp: 180 tablet, Rfl: 1    glimepiride (AMARYL) 2 MG tablet, Take 2 mg by mouth every morning (before breakfast), Disp: , Rfl:     gabapentin (NEURONTIN) 300 MG capsule, Take 300 mg by mouth 2 times daily. , Disp: , Rfl:     SITagliptin (JANUVIA) 50 MG tablet, Take 50 mg by mouth daily, Disp: , Rfl:     bisoprolol-hydrochlorothiazide (ZIAC) 10-6.25 MG per tablet, Take 1 tablet by mouth daily, Disp: , Rfl:     lisinopril (PRINIVIL;ZESTRIL) 20 MG tablet, Take 20 mg by mouth daily, Disp: , Rfl: metformin (GLUCOPHAGE) 500 MG tablet, Take 500 mg by mouth 2 times daily (with meals). , Disp: , Rfl:   No Known Allergies    Past Medical History:   Diagnosis Date    Diabetes mellitus ([facility] [address]     poorly controlled    Hypertension            Vitals:    12/27/22 1020   Weight: 139 lb (63 kg)   Height: 5' 1\" (1.549 m)       Work History/Social History: Foot and ankle history:     Focused Lower Extremity Physical Exam:    Neurovascular examination:    Dorsalis Pedis palpable bilateral.  Posterior tibialis palpable bilateral.    Capillary Refill Time:  Immediate return  Hair growth:  Symmetrical and bilateral   Skin:  Not atrophic  Edema: No edema bilateral feet or ankles. Neurologic:  Light touch intact bilateral.      Musculoskeletal/ Orthopedic examination:    Equinis: Absent bilateral  Dorsiflexion, plantarflexion, inversion, eversion bilateral 5 out of 5 muscle strength  Wiggling toes pain-free. No pain third digit. Flexible hammertoes digits 2 through 4 bilateral      Dermatology examination:    No open skin lesions foot or ankle. No open skin lesions third digit left foot. Assessment and Plan:  Desire Ch was seen today for follow-up, foot pain and diabetes. Diagnoses and all orders for this visit:    Acute idiopathic gout of left foot  -     [facility],, Rheumatology, [place]    Hammer toe of left foot  -     [id number], Rheumatology, [place]    Other orders  -     allopurinol (ZYLOPRIM) 100 MG tablet; Take 1 tablet by mouth daily        Radiographs Big lacy left foot. PROCEDURE: XR foot LT 3 view    No fracture or dislocation. Degenerative change first metatarsal phalangeal joint with bony erosion distal first metatarsal and a adjacent soft tissue swelling. Deformity fifth metatarsal most likely healed fracture. Mild calcaneal spurring. Mild degenerative change of the phalangeal joints with hammertoe deformities. IMPRESSION:    Mild degenerative osteoarthritis. There is also degenerative change with bony erosion and soft tissue swelling first metatarsal phalangeal joint possibly gouty arthritis. Follow-up gout third toe. No open wounds. Tolerating allopurinol well. Risk and benefits of allopurinol discussed. Allopurinol 1 mg once daily. Did place referral to rheumatologist Dr. Bandar Falcon going forward. Appreciate his input. From foot ankle standpoint any new gout flareups or any new gouty tophi I be happy to see her back sooner. There are no open wounds today has progressed well from foot ankle standpoint. We did discuss follow-up 4 months. Return in about 4 months (around 4/27/2023). Seen By:  Soni Campo DPM      Document was created using voice recognition software. Note was reviewed, however may contain grammatical errors.

## 2023-01-09 RX ORDER — ALLOPURINOL 100 MG/1
TABLET ORAL
Qty: 90 TABLET | Refills: 1 | OUTPATIENT
Start: 2023-01-09

## 2023-03-28 RX ORDER — ALLOPURINOL 100 MG/1
TABLET ORAL
Qty: 90 TABLET | Refills: 1 | Status: SHIPPED | OUTPATIENT
Start: 2023-03-28

## 2023-05-24 ENCOUNTER — HOSPITAL ENCOUNTER (INPATIENT)
Age: 68
LOS: 2 days | Discharge: HOME | DRG: 552 | End: 2023-05-28
Attending: EMERGENCY MEDICINE | Admitting: GENERAL PRACTICE
Payer: MEDICARE

## 2023-05-24 DIAGNOSIS — M54.9 INTRACTABLE BACK PAIN: ICD-10-CM

## 2023-05-24 DIAGNOSIS — M54.42 ACUTE LEFT-SIDED BACK PAIN WITH SCIATICA: Primary | ICD-10-CM

## 2023-05-24 PROCEDURE — 6360000002 HC RX W HCPCS: Performed by: EMERGENCY MEDICINE

## 2023-05-24 PROCEDURE — 96372 THER/PROPH/DIAG INJ SC/IM: CPT

## 2023-05-24 PROCEDURE — 99285 EMERGENCY DEPT VISIT HI MDM: CPT

## 2023-05-24 PROCEDURE — 6370000000 HC RX 637 (ALT 250 FOR IP): Performed by: EMERGENCY MEDICINE

## 2023-05-24 RX ORDER — PREDNISONE 10 MG/1
TABLET ORAL
Qty: 30 TABLET | Refills: 0 | Status: SHIPPED | OUTPATIENT
Start: 2023-05-24 | End: 2023-06-05

## 2023-05-24 RX ORDER — KETOROLAC TROMETHAMINE 30 MG/ML
30 INJECTION, SOLUTION INTRAMUSCULAR; INTRAVENOUS ONCE
Status: COMPLETED | OUTPATIENT
Start: 2023-05-24 | End: 2023-05-24

## 2023-05-24 RX ORDER — HYDROCODONE BITARTRATE AND ACETAMINOPHEN 5; 325 MG/1; MG/1
2 TABLET ORAL ONCE
Status: COMPLETED | OUTPATIENT
Start: 2023-05-25 | End: 2023-05-25

## 2023-05-24 RX ORDER — METHOCARBAMOL 500 MG/1
500 TABLET, FILM COATED ORAL 4 TIMES DAILY PRN
Qty: 20 TABLET | Refills: 0 | Status: SHIPPED | OUTPATIENT
Start: 2023-05-24 | End: 2023-05-29

## 2023-05-24 RX ORDER — NAPROXEN 500 MG/1
500 TABLET ORAL 2 TIMES DAILY PRN
Qty: 20 TABLET | Refills: 0 | Status: SHIPPED | OUTPATIENT
Start: 2023-05-24 | End: 2023-06-03

## 2023-05-24 RX ORDER — FENTANYL CITRATE 50 UG/ML
100 INJECTION, SOLUTION INTRAMUSCULAR; INTRAVENOUS ONCE
Status: COMPLETED | OUTPATIENT
Start: 2023-05-24 | End: 2023-05-24

## 2023-05-24 RX ORDER — LIDOCAINE 4 G/G
1 PATCH TOPICAL EVERY 24 HOURS
Qty: 30 PATCH | Refills: 0 | Status: SHIPPED | OUTPATIENT
Start: 2023-05-24 | End: 2023-06-23

## 2023-05-24 RX ORDER — PREDNISONE 20 MG/1
60 TABLET ORAL ONCE
Status: COMPLETED | OUTPATIENT
Start: 2023-05-24 | End: 2023-05-24

## 2023-05-24 RX ADMIN — FENTANYL CITRATE 100 MCG: 50 INJECTION, SOLUTION INTRAMUSCULAR; INTRAVENOUS at 23:21

## 2023-05-24 RX ADMIN — KETOROLAC TROMETHAMINE 30 MG: 30 INJECTION, SOLUTION INTRAMUSCULAR; INTRAVENOUS at 23:22

## 2023-05-24 RX ADMIN — PREDNISONE 60 MG: 20 TABLET ORAL at 23:22

## 2023-05-24 ASSESSMENT — PAIN DESCRIPTION - LOCATION: LOCATION: LEG;BUTTOCKS

## 2023-05-24 ASSESSMENT — PAIN SCALES - GENERAL: PAINLEVEL_OUTOF10: 10

## 2023-05-24 ASSESSMENT — PAIN - FUNCTIONAL ASSESSMENT: PAIN_FUNCTIONAL_ASSESSMENT: 0-10

## 2023-05-24 ASSESSMENT — PAIN DESCRIPTION - ORIENTATION: ORIENTATION: LEFT

## 2023-05-25 ENCOUNTER — APPOINTMENT (OUTPATIENT)
Dept: MRI IMAGING | Age: 68
DRG: 552 | End: 2023-05-25
Payer: MEDICARE

## 2023-05-25 ENCOUNTER — APPOINTMENT (OUTPATIENT)
Dept: CT IMAGING | Age: 68
DRG: 552 | End: 2023-05-25
Payer: MEDICARE

## 2023-05-25 PROBLEM — M54.9 INTRACTABLE BACK PAIN: Status: ACTIVE | Noted: 2023-05-25

## 2023-05-25 LAB
ANION GAP SERPL CALCULATED.3IONS-SCNC: 14 MMOL/L (ref 7–16)
BASOPHILS # BLD: 0.05 E9/L (ref 0–0.2)
BASOPHILS NFR BLD: 0.6 % (ref 0–2)
BUN SERPL-MCNC: 38 MG/DL (ref 6–23)
CALCIUM SERPL-MCNC: 9.6 MG/DL (ref 8.6–10.2)
CHLORIDE SERPL-SCNC: 100 MMOL/L (ref 98–107)
CO2 SERPL-SCNC: 17 MMOL/L (ref 22–29)
CREAT SERPL-MCNC: 1.2 MG/DL (ref 0.5–1)
CRP SERPL HS-MCNC: <0.3 MG/DL (ref 0–0.4)
EOSINOPHIL # BLD: 0.05 E9/L (ref 0.05–0.5)
EOSINOPHIL NFR BLD: 0.6 % (ref 0–6)
ERYTHROCYTE [DISTWIDTH] IN BLOOD BY AUTOMATED COUNT: 12.5 FL (ref 11.5–15)
ERYTHROCYTE [SEDIMENTATION RATE] IN BLOOD BY WESTERGREN METHOD: 55 MM/HR (ref 0–20)
GLUCOSE SERPL-MCNC: 290 MG/DL (ref 74–99)
HCT VFR BLD AUTO: 34.2 % (ref 34–48)
HGB BLD-MCNC: 11.4 G/DL (ref 11.5–15.5)
IMM GRANULOCYTES # BLD: 0.08 E9/L
IMM GRANULOCYTES NFR BLD: 0.9 % (ref 0–5)
LYMPHOCYTES # BLD: 0.8 E9/L (ref 1.5–4)
LYMPHOCYTES NFR BLD: 9 % (ref 20–42)
MAGNESIUM SERPL-MCNC: 1.9 MG/DL (ref 1.6–2.6)
MCH RBC QN AUTO: 30.3 PG (ref 26–35)
MCHC RBC AUTO-ENTMCNC: 33.3 % (ref 32–34.5)
MCV RBC AUTO: 91 FL (ref 80–99.9)
METER GLUCOSE: 282 MG/DL (ref 74–99)
METER GLUCOSE: 329 MG/DL (ref 74–99)
METER GLUCOSE: 341 MG/DL (ref 74–99)
METER GLUCOSE: 378 MG/DL (ref 74–99)
MONOCYTES # BLD: 0.23 E9/L (ref 0.1–0.95)
MONOCYTES NFR BLD: 2.6 % (ref 2–12)
NEUTROPHILS # BLD: 7.63 E9/L (ref 1.8–7.3)
NEUTS SEG NFR BLD: 86.3 % (ref 43–80)
PLATELET # BLD AUTO: 226 E9/L (ref 130–450)
PMV BLD AUTO: 9.1 FL (ref 7–12)
POTASSIUM SERPL-SCNC: 5 MMOL/L (ref 3.5–5)
RBC # BLD AUTO: 3.76 E12/L (ref 3.5–5.5)
SODIUM SERPL-SCNC: 131 MMOL/L (ref 132–146)
WBC # BLD: 8.8 E9/L (ref 4.5–11.5)

## 2023-05-25 PROCEDURE — 6360000002 HC RX W HCPCS: Performed by: EMERGENCY MEDICINE

## 2023-05-25 PROCEDURE — 82962 GLUCOSE BLOOD TEST: CPT

## 2023-05-25 PROCEDURE — 86140 C-REACTIVE PROTEIN: CPT

## 2023-05-25 PROCEDURE — 6360000004 HC RX CONTRAST MEDICATION: Performed by: RADIOLOGY

## 2023-05-25 PROCEDURE — 83735 ASSAY OF MAGNESIUM: CPT

## 2023-05-25 PROCEDURE — 6360000002 HC RX W HCPCS: Performed by: GENERAL PRACTICE

## 2023-05-25 PROCEDURE — 36415 COLL VENOUS BLD VENIPUNCTURE: CPT

## 2023-05-25 PROCEDURE — 6370000000 HC RX 637 (ALT 250 FOR IP): Performed by: GENERAL PRACTICE

## 2023-05-25 PROCEDURE — 96374 THER/PROPH/DIAG INJ IV PUSH: CPT

## 2023-05-25 PROCEDURE — G0378 HOSPITAL OBSERVATION PER HR: HCPCS

## 2023-05-25 PROCEDURE — 85025 COMPLETE CBC W/AUTO DIFF WBC: CPT

## 2023-05-25 PROCEDURE — 85651 RBC SED RATE NONAUTOMATED: CPT

## 2023-05-25 PROCEDURE — 96375 TX/PRO/DX INJ NEW DRUG ADDON: CPT

## 2023-05-25 PROCEDURE — 80048 BASIC METABOLIC PNL TOTAL CA: CPT

## 2023-05-25 PROCEDURE — 97165 OT EVAL LOW COMPLEX 30 MIN: CPT

## 2023-05-25 PROCEDURE — 72131 CT LUMBAR SPINE W/O DYE: CPT

## 2023-05-25 PROCEDURE — 6370000000 HC RX 637 (ALT 250 FOR IP): Performed by: EMERGENCY MEDICINE

## 2023-05-25 PROCEDURE — A9579 GAD-BASE MR CONTRAST NOS,1ML: HCPCS | Performed by: RADIOLOGY

## 2023-05-25 PROCEDURE — 72158 MRI LUMBAR SPINE W/O & W/DYE: CPT

## 2023-05-25 RX ORDER — BISOPROLOL FUMARATE AND HYDROCHLOROTHIAZIDE 10; 6.25 MG/1; MG/1
1 TABLET ORAL DAILY
Status: DISCONTINUED | OUTPATIENT
Start: 2023-05-25 | End: 2023-05-25 | Stop reason: CLARIF

## 2023-05-25 RX ORDER — GLIPIZIDE 5 MG/1
5 TABLET ORAL
Status: DISCONTINUED | OUTPATIENT
Start: 2023-05-25 | End: 2023-05-28 | Stop reason: HOSPADM

## 2023-05-25 RX ORDER — INSULIN LISPRO 100 [IU]/ML
0-4 INJECTION, SOLUTION INTRAVENOUS; SUBCUTANEOUS
Status: DISCONTINUED | OUTPATIENT
Start: 2023-05-25 | End: 2023-05-26

## 2023-05-25 RX ORDER — FENTANYL CITRATE 50 UG/ML
100 INJECTION, SOLUTION INTRAMUSCULAR; INTRAVENOUS ONCE
Status: DISCONTINUED | OUTPATIENT
Start: 2023-05-25 | End: 2023-05-25

## 2023-05-25 RX ORDER — HYDROCHLOROTHIAZIDE 12.5 MG/1
6.25 TABLET ORAL DAILY
Status: DISCONTINUED | OUTPATIENT
Start: 2023-05-25 | End: 2023-05-28 | Stop reason: HOSPADM

## 2023-05-25 RX ORDER — LOSARTAN POTASSIUM 100 MG/1
100 TABLET ORAL DAILY
COMMUNITY

## 2023-05-25 RX ORDER — FENTANYL CITRATE 50 UG/ML
50 INJECTION, SOLUTION INTRAMUSCULAR; INTRAVENOUS ONCE
Status: COMPLETED | OUTPATIENT
Start: 2023-05-25 | End: 2023-05-25

## 2023-05-25 RX ORDER — DEXAMETHASONE SODIUM PHOSPHATE 10 MG/ML
6 INJECTION INTRAMUSCULAR; INTRAVENOUS DAILY
Status: DISCONTINUED | OUTPATIENT
Start: 2023-05-25 | End: 2023-05-26

## 2023-05-25 RX ORDER — ONDANSETRON 2 MG/ML
4 INJECTION INTRAMUSCULAR; INTRAVENOUS EVERY 6 HOURS PRN
Status: DISCONTINUED | OUTPATIENT
Start: 2023-05-25 | End: 2023-05-28 | Stop reason: HOSPADM

## 2023-05-25 RX ORDER — INSULIN LISPRO 100 [IU]/ML
0-4 INJECTION, SOLUTION INTRAVENOUS; SUBCUTANEOUS NIGHTLY
Status: DISCONTINUED | OUTPATIENT
Start: 2023-05-25 | End: 2023-05-26

## 2023-05-25 RX ORDER — METOPROLOL SUCCINATE 100 MG/1
100 TABLET, EXTENDED RELEASE ORAL DAILY
Status: DISCONTINUED | OUTPATIENT
Start: 2023-05-25 | End: 2023-05-28 | Stop reason: HOSPADM

## 2023-05-25 RX ORDER — LOSARTAN POTASSIUM 50 MG/1
100 TABLET ORAL DAILY
Status: DISCONTINUED | OUTPATIENT
Start: 2023-05-25 | End: 2023-05-28 | Stop reason: HOSPADM

## 2023-05-25 RX ORDER — PANTOPRAZOLE SODIUM 40 MG/1
40 TABLET, DELAYED RELEASE ORAL
Status: DISCONTINUED | OUTPATIENT
Start: 2023-05-25 | End: 2023-05-28 | Stop reason: HOSPADM

## 2023-05-25 RX ORDER — ALOGLIPTIN 12.5 MG/1
12.5 TABLET, FILM COATED ORAL DAILY
Status: DISCONTINUED | OUTPATIENT
Start: 2023-05-25 | End: 2023-05-28 | Stop reason: HOSPADM

## 2023-05-25 RX ORDER — HYDROCODONE BITARTRATE AND ACETAMINOPHEN 5; 325 MG/1; MG/1
2 TABLET ORAL EVERY 4 HOURS PRN
Status: DISCONTINUED | OUTPATIENT
Start: 2023-05-25 | End: 2023-05-27

## 2023-05-25 RX ADMIN — FENTANYL CITRATE 50 MCG: 50 INJECTION, SOLUTION INTRAMUSCULAR; INTRAVENOUS at 03:03

## 2023-05-25 RX ADMIN — INSULIN LISPRO 4 UNITS: 100 INJECTION, SOLUTION INTRAVENOUS; SUBCUTANEOUS at 07:23

## 2023-05-25 RX ADMIN — PANTOPRAZOLE SODIUM 40 MG: 40 TABLET, DELAYED RELEASE ORAL at 17:53

## 2023-05-25 RX ADMIN — HYDROCHLOROTHIAZIDE 6.25 MG: 12.5 TABLET ORAL at 09:07

## 2023-05-25 RX ADMIN — HYDROCODONE BITARTRATE AND ACETAMINOPHEN 2 TABLET: 5; 325 TABLET ORAL at 00:13

## 2023-05-25 RX ADMIN — HYDROCODONE BITARTRATE AND ACETAMINOPHEN 2 TABLET: 5; 325 TABLET ORAL at 15:37

## 2023-05-25 RX ADMIN — METFORMIN HYDROCHLORIDE 500 MG: 500 TABLET ORAL at 16:31

## 2023-05-25 RX ADMIN — INSULIN LISPRO 3 UNITS: 100 INJECTION, SOLUTION INTRAVENOUS; SUBCUTANEOUS at 11:22

## 2023-05-25 RX ADMIN — GADOTERIDOL 14 ML: 279.3 INJECTION, SOLUTION INTRAVENOUS at 20:25

## 2023-05-25 RX ADMIN — HYDROCODONE BITARTRATE AND ACETAMINOPHEN 2 TABLET: 5; 325 TABLET ORAL at 21:31

## 2023-05-25 RX ADMIN — ONDANSETRON 4 MG: 2 INJECTION INTRAMUSCULAR; INTRAVENOUS at 17:52

## 2023-05-25 RX ADMIN — METOPROLOL SUCCINATE 100 MG: 100 TABLET, EXTENDED RELEASE ORAL at 09:07

## 2023-05-25 RX ADMIN — DEXAMETHASONE SODIUM PHOSPHATE 6 MG: 10 INJECTION INTRAMUSCULAR; INTRAVENOUS at 09:07

## 2023-05-25 RX ADMIN — HYDROCODONE BITARTRATE AND ACETAMINOPHEN 2 TABLET: 5; 325 TABLET ORAL at 11:26

## 2023-05-25 RX ADMIN — INSULIN LISPRO 3 UNITS: 100 INJECTION, SOLUTION INTRAVENOUS; SUBCUTANEOUS at 16:31

## 2023-05-25 RX ADMIN — ALOGLIPTIN 12.5 MG: 12.5 TABLET, FILM COATED ORAL at 09:07

## 2023-05-25 RX ADMIN — GLIPIZIDE 5 MG: 5 TABLET ORAL at 07:27

## 2023-05-25 RX ADMIN — METFORMIN HYDROCHLORIDE 500 MG: 500 TABLET ORAL at 07:27

## 2023-05-25 RX ADMIN — LOSARTAN POTASSIUM 100 MG: 50 TABLET, FILM COATED ORAL at 09:07

## 2023-05-25 RX ADMIN — HYDROCODONE BITARTRATE AND ACETAMINOPHEN 2 TABLET: 5; 325 TABLET ORAL at 06:42

## 2023-05-25 ASSESSMENT — PAIN DESCRIPTION - LOCATION
LOCATION: HIP;LEG
LOCATION: BACK
LOCATION: BACK;LEG
LOCATION: LEG;HIP
LOCATION: BACK;LEG
LOCATION: BACK;BUTTOCKS

## 2023-05-25 ASSESSMENT — PAIN DESCRIPTION - ORIENTATION
ORIENTATION: LEFT

## 2023-05-25 ASSESSMENT — PAIN SCALES - GENERAL
PAINLEVEL_OUTOF10: 7
PAINLEVEL_OUTOF10: 4
PAINLEVEL_OUTOF10: 7
PAINLEVEL_OUTOF10: 6
PAINLEVEL_OUTOF10: 7
PAINLEVEL_OUTOF10: 10
PAINLEVEL_OUTOF10: 8

## 2023-05-25 ASSESSMENT — LIFESTYLE VARIABLES
HOW OFTEN DO YOU HAVE A DRINK CONTAINING ALCOHOL: NEVER
HOW MANY STANDARD DRINKS CONTAINING ALCOHOL DO YOU HAVE ON A TYPICAL DAY: PATIENT DOES NOT DRINK

## 2023-05-25 ASSESSMENT — PAIN SCALES - WONG BAKER: WONGBAKER_NUMERICALRESPONSE: 0

## 2023-05-25 ASSESSMENT — PAIN DESCRIPTION - DESCRIPTORS
DESCRIPTORS: ACHING;CRAMPING;DISCOMFORT
DESCRIPTORS: STABBING;NAGGING
DESCRIPTORS: NAGGING;DISCOMFORT

## 2023-05-25 ASSESSMENT — PAIN - FUNCTIONAL ASSESSMENT
PAIN_FUNCTIONAL_ASSESSMENT: ACTIVITIES ARE NOT PREVENTED
PAIN_FUNCTIONAL_ASSESSMENT: PREVENTS OR INTERFERES WITH MANY ACTIVE NOT PASSIVE ACTIVITIES

## 2023-05-26 LAB
METER GLUCOSE: 176 MG/DL (ref 74–99)
METER GLUCOSE: 225 MG/DL (ref 74–99)
METER GLUCOSE: 321 MG/DL (ref 74–99)
METER GLUCOSE: 400 MG/DL (ref 74–99)
METER GLUCOSE: 426 MG/DL (ref 74–99)

## 2023-05-26 PROCEDURE — 1200000000 HC SEMI PRIVATE

## 2023-05-26 PROCEDURE — 6370000000 HC RX 637 (ALT 250 FOR IP): Performed by: GENERAL PRACTICE

## 2023-05-26 PROCEDURE — 6360000002 HC RX W HCPCS: Performed by: GENERAL PRACTICE

## 2023-05-26 PROCEDURE — 97161 PT EVAL LOW COMPLEX 20 MIN: CPT

## 2023-05-26 PROCEDURE — 82962 GLUCOSE BLOOD TEST: CPT

## 2023-05-26 PROCEDURE — 97530 THERAPEUTIC ACTIVITIES: CPT

## 2023-05-26 PROCEDURE — 96376 TX/PRO/DX INJ SAME DRUG ADON: CPT

## 2023-05-26 RX ORDER — DEXAMETHASONE SODIUM PHOSPHATE 4 MG/ML
3 INJECTION, SOLUTION INTRA-ARTICULAR; INTRALESIONAL; INTRAMUSCULAR; INTRAVENOUS; SOFT TISSUE DAILY
Status: DISCONTINUED | OUTPATIENT
Start: 2023-05-27 | End: 2023-05-28 | Stop reason: HOSPADM

## 2023-05-26 RX ORDER — INSULIN LISPRO 100 [IU]/ML
0-8 INJECTION, SOLUTION INTRAVENOUS; SUBCUTANEOUS
Status: DISCONTINUED | OUTPATIENT
Start: 2023-05-26 | End: 2023-05-28 | Stop reason: HOSPADM

## 2023-05-26 RX ORDER — INSULIN LISPRO 100 [IU]/ML
0-4 INJECTION, SOLUTION INTRAVENOUS; SUBCUTANEOUS NIGHTLY
Status: DISCONTINUED | OUTPATIENT
Start: 2023-05-26 | End: 2023-05-28 | Stop reason: HOSPADM

## 2023-05-26 RX ADMIN — DEXAMETHASONE SODIUM PHOSPHATE 6 MG: 10 INJECTION INTRAMUSCULAR; INTRAVENOUS at 09:02

## 2023-05-26 RX ADMIN — GLIPIZIDE 5 MG: 5 TABLET ORAL at 06:41

## 2023-05-26 RX ADMIN — INSULIN LISPRO 4 UNITS: 100 INJECTION, SOLUTION INTRAVENOUS; SUBCUTANEOUS at 16:20

## 2023-05-26 RX ADMIN — METOPROLOL SUCCINATE 100 MG: 100 TABLET, EXTENDED RELEASE ORAL at 09:04

## 2023-05-26 RX ADMIN — HYDROCODONE BITARTRATE AND ACETAMINOPHEN 2 TABLET: 5; 325 TABLET ORAL at 06:35

## 2023-05-26 RX ADMIN — HYDROCODONE BITARTRATE AND ACETAMINOPHEN 2 TABLET: 5; 325 TABLET ORAL at 13:05

## 2023-05-26 RX ADMIN — HYDROCODONE BITARTRATE AND ACETAMINOPHEN 2 TABLET: 5; 325 TABLET ORAL at 01:43

## 2023-05-26 RX ADMIN — INSULIN LISPRO 1 UNITS: 100 INJECTION, SOLUTION INTRAVENOUS; SUBCUTANEOUS at 07:41

## 2023-05-26 RX ADMIN — LOSARTAN POTASSIUM 100 MG: 50 TABLET, FILM COATED ORAL at 09:04

## 2023-05-26 RX ADMIN — PANTOPRAZOLE SODIUM 40 MG: 40 TABLET, DELAYED RELEASE ORAL at 06:36

## 2023-05-26 RX ADMIN — HYDROCODONE BITARTRATE AND ACETAMINOPHEN 2 TABLET: 5; 325 TABLET ORAL at 21:47

## 2023-05-26 RX ADMIN — METFORMIN HYDROCHLORIDE 500 MG: 500 TABLET ORAL at 09:04

## 2023-05-26 RX ADMIN — ALOGLIPTIN 12.5 MG: 12.5 TABLET, FILM COATED ORAL at 09:03

## 2023-05-26 RX ADMIN — HYDROCODONE BITARTRATE AND ACETAMINOPHEN 2 TABLET: 5; 325 TABLET ORAL at 17:43

## 2023-05-26 RX ADMIN — METFORMIN HYDROCHLORIDE 500 MG: 500 TABLET ORAL at 16:21

## 2023-05-26 RX ADMIN — ONDANSETRON 4 MG: 2 INJECTION INTRAMUSCULAR; INTRAVENOUS at 01:43

## 2023-05-26 RX ADMIN — HYDROCHLOROTHIAZIDE 6.25 MG: 12.5 TABLET ORAL at 09:03

## 2023-05-26 RX ADMIN — INSULIN LISPRO 4 UNITS: 100 INJECTION, SOLUTION INTRAVENOUS; SUBCUTANEOUS at 21:17

## 2023-05-26 ASSESSMENT — PAIN SCALES - GENERAL
PAINLEVEL_OUTOF10: 7
PAINLEVEL_OUTOF10: 9
PAINLEVEL_OUTOF10: 10
PAINLEVEL_OUTOF10: 3
PAINLEVEL_OUTOF10: 10
PAINLEVEL_OUTOF10: 5

## 2023-05-26 ASSESSMENT — PAIN - FUNCTIONAL ASSESSMENT
PAIN_FUNCTIONAL_ASSESSMENT: ACTIVITIES ARE NOT PREVENTED
PAIN_FUNCTIONAL_ASSESSMENT: ACTIVITIES ARE NOT PREVENTED

## 2023-05-26 ASSESSMENT — PAIN DESCRIPTION - LOCATION
LOCATION: BACK
LOCATION: BACK;BUTTOCKS
LOCATION: BACK
LOCATION: BACK;BUTTOCKS
LOCATION: BACK

## 2023-05-26 ASSESSMENT — PAIN SCALES - WONG BAKER
WONGBAKER_NUMERICALRESPONSE: 2
WONGBAKER_NUMERICALRESPONSE: 0

## 2023-05-26 ASSESSMENT — PAIN DESCRIPTION - ORIENTATION: ORIENTATION: LEFT

## 2023-05-26 ASSESSMENT — PAIN DESCRIPTION - DESCRIPTORS: DESCRIPTORS: DISCOMFORT

## 2023-05-27 LAB
METER GLUCOSE: 136 MG/DL (ref 74–99)
METER GLUCOSE: 156 MG/DL (ref 74–99)
METER GLUCOSE: 182 MG/DL (ref 74–99)
METER GLUCOSE: 327 MG/DL (ref 74–99)

## 2023-05-27 PROCEDURE — 6360000002 HC RX W HCPCS: Performed by: GENERAL PRACTICE

## 2023-05-27 PROCEDURE — 6370000000 HC RX 637 (ALT 250 FOR IP): Performed by: GENERAL PRACTICE

## 2023-05-27 PROCEDURE — 82962 GLUCOSE BLOOD TEST: CPT

## 2023-05-27 PROCEDURE — 1200000000 HC SEMI PRIVATE

## 2023-05-27 RX ORDER — HYDROCODONE BITARTRATE AND ACETAMINOPHEN 5; 325 MG/1; MG/1
1 TABLET ORAL EVERY 6 HOURS PRN
Qty: 28 TABLET | Refills: 0 | Status: SHIPPED | OUTPATIENT
Start: 2023-05-27 | End: 2023-06-03

## 2023-05-27 RX ORDER — HYDROCODONE BITARTRATE AND ACETAMINOPHEN 5; 325 MG/1; MG/1
2 TABLET ORAL EVERY 4 HOURS PRN
Status: DISCONTINUED | OUTPATIENT
Start: 2023-05-27 | End: 2023-05-28 | Stop reason: HOSPADM

## 2023-05-27 RX ORDER — HYDROCODONE BITARTRATE AND ACETAMINOPHEN 5; 325 MG/1; MG/1
1 TABLET ORAL EVERY 4 HOURS PRN
Status: DISCONTINUED | OUTPATIENT
Start: 2023-05-27 | End: 2023-05-27

## 2023-05-27 RX ADMIN — HYDROCODONE BITARTRATE AND ACETAMINOPHEN 2 TABLET: 5; 325 TABLET ORAL at 01:54

## 2023-05-27 RX ADMIN — METOPROLOL SUCCINATE 100 MG: 100 TABLET, EXTENDED RELEASE ORAL at 09:01

## 2023-05-27 RX ADMIN — HYDROCHLOROTHIAZIDE 6.25 MG: 12.5 TABLET ORAL at 09:01

## 2023-05-27 RX ADMIN — ALOGLIPTIN 12.5 MG: 12.5 TABLET, FILM COATED ORAL at 09:01

## 2023-05-27 RX ADMIN — GLIPIZIDE 5 MG: 5 TABLET ORAL at 06:17

## 2023-05-27 RX ADMIN — DEXAMETHASONE SODIUM PHOSPHATE 3 MG: 4 INJECTION, SOLUTION INTRAMUSCULAR; INTRAVENOUS at 09:02

## 2023-05-27 RX ADMIN — HYDROCODONE BITARTRATE AND ACETAMINOPHEN 1 TABLET: 5; 325 TABLET ORAL at 12:46

## 2023-05-27 RX ADMIN — METFORMIN HYDROCHLORIDE 500 MG: 500 TABLET ORAL at 17:10

## 2023-05-27 RX ADMIN — LOSARTAN POTASSIUM 100 MG: 50 TABLET, FILM COATED ORAL at 09:01

## 2023-05-27 RX ADMIN — HYDROCODONE BITARTRATE AND ACETAMINOPHEN 2 TABLET: 5; 325 TABLET ORAL at 06:16

## 2023-05-27 RX ADMIN — INSULIN LISPRO 6 UNITS: 100 INJECTION, SOLUTION INTRAVENOUS; SUBCUTANEOUS at 17:06

## 2023-05-27 RX ADMIN — PANTOPRAZOLE SODIUM 40 MG: 40 TABLET, DELAYED RELEASE ORAL at 06:17

## 2023-05-27 RX ADMIN — HYDROCODONE BITARTRATE AND ACETAMINOPHEN 2 TABLET: 5; 325 TABLET ORAL at 21:07

## 2023-05-27 RX ADMIN — METFORMIN HYDROCHLORIDE 500 MG: 500 TABLET ORAL at 09:01

## 2023-05-27 RX ADMIN — HYDROCODONE BITARTRATE AND ACETAMINOPHEN 2 TABLET: 5; 325 TABLET ORAL at 17:01

## 2023-05-27 ASSESSMENT — PAIN DESCRIPTION - LOCATION
LOCATION: BACK;BUTTOCKS
LOCATION: BACK;HIP
LOCATION: BACK;HIP
LOCATION: BACK;BUTTOCKS
LOCATION: HIP;BACK

## 2023-05-27 ASSESSMENT — PAIN SCALES - GENERAL
PAINLEVEL_OUTOF10: 8
PAINLEVEL_OUTOF10: 10
PAINLEVEL_OUTOF10: 10
PAINLEVEL_OUTOF10: 5
PAINLEVEL_OUTOF10: 8

## 2023-05-27 ASSESSMENT — PAIN DESCRIPTION - ORIENTATION
ORIENTATION: LEFT

## 2023-05-27 ASSESSMENT — PAIN DESCRIPTION - DESCRIPTORS
DESCRIPTORS: STABBING
DESCRIPTORS: ACHING;CRAMPING;DISCOMFORT
DESCRIPTORS: STABBING
DESCRIPTORS: DISCOMFORT;STABBING;SORE
DESCRIPTORS: ACHING;CRAMPING;DISCOMFORT

## 2023-05-27 ASSESSMENT — PAIN SCALES - WONG BAKER: WONGBAKER_NUMERICALRESPONSE: 0

## 2023-05-27 ASSESSMENT — PAIN - FUNCTIONAL ASSESSMENT
PAIN_FUNCTIONAL_ASSESSMENT: PREVENTS OR INTERFERES SOME ACTIVE ACTIVITIES AND ADLS
PAIN_FUNCTIONAL_ASSESSMENT: PREVENTS OR INTERFERES SOME ACTIVE ACTIVITIES AND ADLS

## 2023-05-28 VITALS
HEIGHT: 61 IN | HEART RATE: 67 BPM | TEMPERATURE: 98.5 F | RESPIRATION RATE: 18 BRPM | DIASTOLIC BLOOD PRESSURE: 69 MMHG | WEIGHT: 147 LBS | SYSTOLIC BLOOD PRESSURE: 129 MMHG | OXYGEN SATURATION: 99 % | BODY MASS INDEX: 27.75 KG/M2

## 2023-05-28 LAB — METER GLUCOSE: 123 MG/DL (ref 74–99)

## 2023-05-28 PROCEDURE — 6360000002 HC RX W HCPCS: Performed by: GENERAL PRACTICE

## 2023-05-28 PROCEDURE — 82962 GLUCOSE BLOOD TEST: CPT

## 2023-05-28 PROCEDURE — 6370000000 HC RX 637 (ALT 250 FOR IP): Performed by: GENERAL PRACTICE

## 2023-05-28 RX ADMIN — PANTOPRAZOLE SODIUM 40 MG: 40 TABLET, DELAYED RELEASE ORAL at 06:41

## 2023-05-28 RX ADMIN — HYDROCHLOROTHIAZIDE 6.25 MG: 12.5 TABLET ORAL at 10:45

## 2023-05-28 RX ADMIN — HYDROCODONE BITARTRATE AND ACETAMINOPHEN 2 TABLET: 5; 325 TABLET ORAL at 06:41

## 2023-05-28 RX ADMIN — GLIPIZIDE 5 MG: 5 TABLET ORAL at 06:42

## 2023-05-28 RX ADMIN — DEXAMETHASONE SODIUM PHOSPHATE 3 MG: 4 INJECTION, SOLUTION INTRAMUSCULAR; INTRAVENOUS at 10:46

## 2023-05-28 RX ADMIN — METOPROLOL SUCCINATE 100 MG: 100 TABLET, EXTENDED RELEASE ORAL at 10:45

## 2023-05-28 RX ADMIN — HYDROCODONE BITARTRATE AND ACETAMINOPHEN 2 TABLET: 5; 325 TABLET ORAL at 01:51

## 2023-05-28 RX ADMIN — LOSARTAN POTASSIUM 100 MG: 50 TABLET, FILM COATED ORAL at 10:45

## 2023-05-28 RX ADMIN — METFORMIN HYDROCHLORIDE 500 MG: 500 TABLET ORAL at 10:46

## 2023-05-28 RX ADMIN — HYDROCODONE BITARTRATE AND ACETAMINOPHEN 2 TABLET: 5; 325 TABLET ORAL at 10:45

## 2023-05-28 RX ADMIN — ALOGLIPTIN 12.5 MG: 12.5 TABLET, FILM COATED ORAL at 10:45

## 2023-05-28 ASSESSMENT — PAIN DESCRIPTION - LOCATION
LOCATION: HIP;LEG
LOCATION: BACK;HIP

## 2023-05-28 ASSESSMENT — PAIN - FUNCTIONAL ASSESSMENT: PAIN_FUNCTIONAL_ASSESSMENT: PREVENTS OR INTERFERES SOME ACTIVE ACTIVITIES AND ADLS

## 2023-05-28 ASSESSMENT — PAIN DESCRIPTION - DESCRIPTORS
DESCRIPTORS: DISCOMFORT;SORE
DESCRIPTORS: SQUEEZING

## 2023-05-28 ASSESSMENT — PAIN DESCRIPTION - ORIENTATION
ORIENTATION: LEFT
ORIENTATION: LEFT

## 2023-05-28 ASSESSMENT — PAIN SCALES - GENERAL
PAINLEVEL_OUTOF10: 7
PAINLEVEL_OUTOF10: 8

## 2023-05-29 NOTE — HOME CARE
PATIENT DC WITH NO HHC ORDERS.  [place] [id number] ORDERS NEED ADDED FOR PATIENTS SSOC     Guerita Schofield LPN   [place] [id number]

## 2023-05-30 NOTE — CARE COORDINATION
Social Work:    Follow-up call made to Parish who discharged without [address] order per [facility]. Parish advised  that she plans to follow-up with her PCP and is not in need of home care. Social work canceled [address] with [facility] care.     Electronically signed by BARBARA Wells on 5/30/2023 at 2:31 PM

## 2023-05-30 NOTE — DISCHARGE SUMMARY
[id number] [address]                               DISCHARGE SUMMARY    PATIENT NAME: Mak Garrison                     :        1955  MED REC NO:   [id number]                            ROOM:       0493  ACCOUNT NO:   [de-identified]                           ADMIT DATE: 2023  PROVIDER:     Bharati Tim DO                  [facility] DATE: 2023    FINAL DIAGNOSES:  1. Degenerative disk disease of lumbar spine with both central canal  stenosis and bilateral neuroforaminal stenosis at multiple levels of the  lower lumbar spine, worse on the left than the right. 2. L5-S1 anterolisthesis. 3.  Degenerative disk disease and degenerative joint disease of lumbar  spine. 4.  Diabetes mellitus, worsened with steroids, now much better. 5.  Hypertension. HISTORY AND HOSPITAL COURSE:  The patient is a 71-year-old white female  was staining her back patio and bending over frequently and noticed  acute pain in her low back and just had intractable pain and presented  to the hospital for evaluation. She had a CT and MRI of the lumbar  spine, which demonstrated multiple levels of degenerative disk disease,  anterolisthesis, central canal stenosis, and neuroforaminal stenosis,  worse on the left than the right. She was treated with initially  parenteral narcotics for pain and subsequently switched over to p.o. and  she was also given a short course of intravenous steroids and slowly  improved over the next several days. Pain medication decreased. Blood  sugars became elevated and we had to adjust her sliding scale insulin  and lower her steroids, but she feels better overall. She has normal  bowel and bladder function. She is walking pretty good. She requested  to go home. We are going to send her home.   Follow her up in the office  and entertain pain management, maybe epidural as she has had them in the  past

## 2023-06-23 ENCOUNTER — OFFICE VISIT (OUTPATIENT)
Dept: RHEUMATOLOGY | Age: 68
End: 2023-06-23
Payer: MEDICARE

## 2023-06-23 VITALS — BODY MASS INDEX: 26.62 KG/M2 | HEIGHT: 61 IN | WEIGHT: 141 LBS

## 2023-06-23 DIAGNOSIS — M1A.39X1 CHRONIC GOUT DUE TO RENAL IMPAIRMENT OF MULTIPLE SITES WITH TOPHUS: Primary | ICD-10-CM

## 2023-06-23 DIAGNOSIS — M13.0 POLYARTHRITIS: ICD-10-CM

## 2023-06-23 DIAGNOSIS — M15.9 GENERALIZED OSTEOARTHRITIS: ICD-10-CM

## 2023-06-23 DIAGNOSIS — M1A.39X1 CHRONIC GOUT DUE TO RENAL IMPAIRMENT OF MULTIPLE SITES WITH TOPHUS: ICD-10-CM

## 2023-06-23 DIAGNOSIS — N18.31 STAGE 3A CHRONIC KIDNEY DISEASE (HCC): ICD-10-CM

## 2023-06-23 LAB
ALBUMIN SERPL-MCNC: 4.4 G/DL (ref 3.5–5.2)
ALP SERPL-CCNC: 74 U/L (ref 35–104)
ALT SERPL-CCNC: 14 U/L (ref 0–32)
ANION GAP SERPL CALCULATED.3IONS-SCNC: 14 MMOL/L (ref 7–16)
AST SERPL-CCNC: 15 U/L (ref 0–31)
BILIRUB SERPL-MCNC: 0.5 MG/DL (ref 0–1.2)
BUN SERPL-MCNC: 40 MG/DL (ref 6–23)
CALCIUM SERPL-MCNC: 9.8 MG/DL (ref 8.6–10.2)
CHLORIDE SERPL-SCNC: 102 MMOL/L (ref 98–107)
CO2 SERPL-SCNC: 25 MMOL/L (ref 22–29)
CREAT SERPL-MCNC: 1.1 MG/DL (ref 0.5–1)
CRP SERPL HS-MCNC: 0.3 MG/DL (ref 0–0.4)
ERYTHROCYTE [SEDIMENTATION RATE] IN BLOOD BY WESTERGREN METHOD: 54 MM/HR (ref 0–20)
GLUCOSE SERPL-MCNC: 76 MG/DL (ref 74–99)
POTASSIUM SERPL-SCNC: 4.3 MMOL/L (ref 3.5–5)
PROT SERPL-MCNC: 7.1 G/DL (ref 6.4–8.3)
RHEUMATOID FACT SER NEPH-ACNC: <10 IU/ML (ref 0–13)
SODIUM SERPL-SCNC: 141 MMOL/L (ref 132–146)
URATE SERPL-MCNC: 8.4 MG/DL (ref 2.4–5.7)

## 2023-06-23 PROCEDURE — 3017F COLORECTAL CA SCREEN DOC REV: CPT | Performed by: INTERNAL MEDICINE

## 2023-06-23 PROCEDURE — G8417 CALC BMI ABV UP PARAM F/U: HCPCS | Performed by: INTERNAL MEDICINE

## 2023-06-23 PROCEDURE — G8427 DOCREV CUR MEDS BY ELIG CLIN: HCPCS | Performed by: INTERNAL MEDICINE

## 2023-06-23 PROCEDURE — 1123F ACP DISCUSS/DSCN MKR DOCD: CPT | Performed by: INTERNAL MEDICINE

## 2023-06-23 PROCEDURE — 1090F PRES/ABSN URINE INCON ASSESS: CPT | Performed by: INTERNAL MEDICINE

## 2023-06-23 PROCEDURE — 99204 OFFICE O/P NEW MOD 45 MIN: CPT | Performed by: INTERNAL MEDICINE

## 2023-06-23 PROCEDURE — 1111F DSCHRG MED/CURRENT MED MERGE: CPT | Performed by: INTERNAL MEDICINE

## 2023-06-23 PROCEDURE — G8400 PT W/DXA NO RESULTS DOC: HCPCS | Performed by: INTERNAL MEDICINE

## 2023-06-23 PROCEDURE — 1036F TOBACCO NON-USER: CPT | Performed by: INTERNAL MEDICINE

## 2023-06-23 RX ORDER — FEBUXOSTAT 40 MG/1
40 TABLET, FILM COATED ORAL DAILY
Qty: 30 TABLET | Refills: 5 | Status: SHIPPED | OUTPATIENT
Start: 2023-06-23

## 2023-06-23 RX ORDER — COLCHICINE 0.6 MG/1
0.6 TABLET ORAL 2 TIMES DAILY
Qty: 60 TABLET | Refills: 3 | Status: SHIPPED | OUTPATIENT
Start: 2023-06-23

## 2023-06-23 ASSESSMENT — ENCOUNTER SYMPTOMS
COLOR CHANGE: 0
TROUBLE SWALLOWING: 0
SHORTNESS OF BREATH: 0
DIARRHEA: 0
VOMITING: 0
ABDOMINAL PAIN: 0
NAUSEA: 0
BACK PAIN: 1
COUGH: 0

## 2023-06-23 NOTE — PATIENT INSTRUCTIONS
Start febuxostat one tab daily    Start colchicine one tab twice per day    Have blood work in 1 month    Have Xrays

## 2023-06-23 NOTE — PROGRESS NOTES
Stephan Matt 1955 is a 79 y.o. female, here for evaluation of the following chief complaint(s):  New Patient (Patient here as a new patient for chronic gout issues.)         ASSESSMENT/PLAN:    Stephan Matt 1955 is a 79 y.o. female seen in consult for gout. 1.  Chronic tophaceous gout-this appears to be more intercritical gout as she cannot really recall ever having an acute flare but has significant uric acid burden with tophi. We will need to get her uric acid down below 5. She unfortunately did not tolerate allopurinol. We will start her on Uloric 40 mg daily. We will put her on colchicine twice daily for prophylaxis. 2.  Polyarthritis-I suspect her issues are indeed a combination of osteoarthritis and intercritical gout but will further work-up as below. 3.  Osteoarthritis-she has osteoarthritis as well either way. Most bothersome currently is the left knee which she will ultimately need replaced. Otherwise treatment is symptomatic. 4.  CKD 3-very mild. Should not be any issues with colchicine but would like to try to avoid NSAIDs is much as possible. 1. 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 Polyarthritis  -     Comprehensive Metabolic Panel; Future  -     C-Reactive Protein; Future  -     Sedimentation Rate; Future  -     Rheumatoid Factor; Future  -     Cyclic Citrul Peptide Antibody, IgG; Future  -     Uric Acid; Standing  -     XR HAND LEFT (MIN 3 VIEWS); Future  -     XR HAND RIGHT (MIN 3 VIEWS); Future  -     XR FOOT LEFT (MIN 3 VIEWS);  Future  -

## 2023-06-26 ENCOUNTER — TELEPHONE (OUTPATIENT)
Dept: RHEUMATOLOGY | Age: 68
End: 2023-06-26

## 2023-06-28 LAB — CCP AB SER IA-ACNC: 1 U/ML (ref 0–7)

## 2023-07-23 DIAGNOSIS — M13.0 POLYARTHRITIS: Primary | ICD-10-CM

## 2023-07-23 DIAGNOSIS — M1A.39X1 CHRONIC GOUT DUE TO RENAL IMPAIRMENT OF MULTIPLE SITES WITH TOPHUS: ICD-10-CM

## 2023-07-24 RX ORDER — FEBUXOSTAT 40 MG/1
TABLET, FILM COATED ORAL
Qty: 30 TABLET | Refills: 5 | Status: SHIPPED | OUTPATIENT
Start: 2023-07-24

## 2024-03-10 ENCOUNTER — APPOINTMENT (OUTPATIENT)
Dept: CT IMAGING | Age: 69
DRG: 853 | End: 2024-03-10
Payer: MEDICARE

## 2024-03-10 ENCOUNTER — HOSPITAL ENCOUNTER (INPATIENT)
Age: 69
LOS: 7 days | Discharge: HOME | DRG: 853 | End: 2024-03-18
Attending: EMERGENCY MEDICINE | Admitting: GENERAL PRACTICE
Payer: MEDICARE

## 2024-03-10 DIAGNOSIS — N18.31 STAGE 3A CHRONIC KIDNEY DISEASE (HCC): ICD-10-CM

## 2024-03-10 DIAGNOSIS — E10.10 DKA, TYPE 1, NOT AT GOAL (HCC): ICD-10-CM

## 2024-03-10 DIAGNOSIS — N20.0 KIDNEY STONE: ICD-10-CM

## 2024-03-10 DIAGNOSIS — R19.7 NAUSEA VOMITING AND DIARRHEA: ICD-10-CM

## 2024-03-10 DIAGNOSIS — N20.0 RENAL CALCULUS, LEFT: ICD-10-CM

## 2024-03-10 DIAGNOSIS — R79.89 ELEVATED LACTIC ACID LEVEL: ICD-10-CM

## 2024-03-10 DIAGNOSIS — M1A.39X1 CHRONIC GOUT DUE TO RENAL IMPAIRMENT OF MULTIPLE SITES WITH TOPHUS: ICD-10-CM

## 2024-03-10 DIAGNOSIS — N17.9 AKI (ACUTE KIDNEY INJURY) (HCC): Primary | ICD-10-CM

## 2024-03-10 DIAGNOSIS — E11.65 HYPERGLYCEMIA DUE TO DIABETES MELLITUS (HCC): ICD-10-CM

## 2024-03-10 DIAGNOSIS — R11.2 NAUSEA VOMITING AND DIARRHEA: ICD-10-CM

## 2024-03-10 DIAGNOSIS — M15.9 GENERALIZED OSTEOARTHRITIS: ICD-10-CM

## 2024-03-10 DIAGNOSIS — E13.10 DIABETIC KETOACIDOSIS WITHOUT COMA ASSOCIATED WITH OTHER SPECIFIED DIABETES MELLITUS (HCC): ICD-10-CM

## 2024-03-10 LAB
ALBUMIN SERPL-MCNC: 3.5 G/DL (ref 3.5–5.2)
ALP SERPL-CCNC: 109 U/L (ref 35–104)
ALT SERPL-CCNC: 23 U/L (ref 0–32)
ANION GAP SERPL CALCULATED.3IONS-SCNC: 27 MMOL/L (ref 7–16)
AST SERPL-CCNC: 27 U/L (ref 0–31)
B-OH-BUTYR SERPL-MCNC: 2.94 MMOL/L (ref 0.02–0.27)
BASOPHILS # BLD: 0 K/UL (ref 0–0.2)
BASOPHILS NFR BLD: 0 % (ref 0–2)
BILIRUB SERPL-MCNC: 1.8 MG/DL (ref 0–1.2)
BILIRUB UR QL STRIP: NEGATIVE
BUN SERPL-MCNC: 71 MG/DL (ref 6–23)
CALCIUM SERPL-MCNC: 8.7 MG/DL (ref 8.6–10.2)
CHLORIDE SERPL-SCNC: 86 MMOL/L (ref 98–107)
CLARITY UR: CLEAR
CO2 SERPL-SCNC: 16 MMOL/L (ref 22–29)
COLOR UR: YELLOW
CREAT SERPL-MCNC: 3.3 MG/DL (ref 0.5–1)
EOSINOPHIL # BLD: 0 K/UL (ref 0.05–0.5)
EOSINOPHILS RELATIVE PERCENT: 0 % (ref 0–6)
ERYTHROCYTE [DISTWIDTH] IN BLOOD BY AUTOMATED COUNT: 12.9 % (ref 11.5–15)
GFR SERPL CREATININE-BSD FRML MDRD: 15 ML/MIN/1.73M2
GLUCOSE BLD-MCNC: 449 MG/DL (ref 74–99)
GLUCOSE BLD-MCNC: >500 MG/DL (ref 74–99)
GLUCOSE SERPL-MCNC: 497 MG/DL (ref 74–99)
GLUCOSE UR STRIP-MCNC: 500 MG/DL
HCT VFR BLD AUTO: 31.7 % (ref 34–48)
HGB BLD-MCNC: 10.8 G/DL (ref 11.5–15.5)
HGB UR QL STRIP.AUTO: ABNORMAL
KETONES UR STRIP-MCNC: ABNORMAL MG/DL
LACTATE BLDV-SCNC: 5 MMOL/L (ref 0.5–1.9)
LEUKOCYTE ESTERASE UR QL STRIP: ABNORMAL
LIPASE SERPL-CCNC: 61 U/L (ref 13–60)
LYMPHOCYTES NFR BLD: 0.29 K/UL (ref 1.5–4)
LYMPHOCYTES RELATIVE PERCENT: 2 % (ref 20–42)
MCH RBC QN AUTO: 29.3 PG (ref 26–35)
MCHC RBC AUTO-ENTMCNC: 34.1 G/DL (ref 32–34.5)
MCV RBC AUTO: 86.1 FL (ref 80–99.9)
MONOCYTES NFR BLD: 0.43 K/UL (ref 0.1–0.95)
MONOCYTES NFR BLD: 3 % (ref 2–12)
NEUTROPHILS NFR BLD: 96 % (ref 43–80)
NEUTS SEG NFR BLD: 15.69 K/UL (ref 1.8–7.3)
NITRITE UR QL STRIP: NEGATIVE
PH UR STRIP: 5.5 [PH] (ref 5–9)
PH VENOUS: 7.34 (ref 7.35–7.45)
PLATELET # BLD AUTO: 89 K/UL (ref 130–450)
PLATELET CONFIRMATION: NORMAL
PMV BLD AUTO: 10.5 FL (ref 7–12)
POTASSIUM SERPL-SCNC: 4.1 MMOL/L (ref 3.5–5)
PROT SERPL-MCNC: 7.1 G/DL (ref 6.4–8.3)
PROT UR STRIP-MCNC: 30 MG/DL
RBC # BLD AUTO: 3.68 M/UL (ref 3.5–5.5)
RBC # BLD: ABNORMAL 10*6/UL
RBC # BLD: ABNORMAL 10*6/UL
RBC #/AREA URNS HPF: NORMAL /HPF
SODIUM SERPL-SCNC: 129 MMOL/L (ref 132–146)
SP GR UR STRIP: 1.02 (ref 1–1.03)
TROPONIN I SERPL HS-MCNC: 19 NG/L (ref 0–9)
TROPONIN I SERPL HS-MCNC: 21 NG/L (ref 0–9)
UROBILINOGEN UR STRIP-ACNC: 0.2 EU/DL (ref 0–1)
WBC #/AREA URNS HPF: NORMAL /HPF
WBC OTHER # BLD: 16.4 K/UL (ref 4.5–11.5)

## 2024-03-10 PROCEDURE — 82010 KETONE BODYS QUAN: CPT

## 2024-03-10 PROCEDURE — 36556 INSERT NON-TUNNEL CV CATH: CPT

## 2024-03-10 PROCEDURE — 87086 URINE CULTURE/COLONY COUNT: CPT

## 2024-03-10 PROCEDURE — 74176 CT ABD & PELVIS W/O CONTRAST: CPT

## 2024-03-10 PROCEDURE — 84484 ASSAY OF TROPONIN QUANT: CPT

## 2024-03-10 PROCEDURE — 99285 EMERGENCY DEPT VISIT HI MDM: CPT

## 2024-03-10 PROCEDURE — 82962 GLUCOSE BLOOD TEST: CPT

## 2024-03-10 PROCEDURE — 2580000003 HC RX 258: Performed by: EMERGENCY MEDICINE

## 2024-03-10 PROCEDURE — 2580000003 HC RX 258: Performed by: STUDENT IN AN ORGANIZED HEALTH CARE EDUCATION/TRAINING PROGRAM

## 2024-03-10 PROCEDURE — 85025 COMPLETE CBC W/AUTO DIFF WBC: CPT

## 2024-03-10 PROCEDURE — 81001 URINALYSIS AUTO W/SCOPE: CPT

## 2024-03-10 PROCEDURE — 80053 COMPREHEN METABOLIC PANEL: CPT

## 2024-03-10 PROCEDURE — 6370000000 HC RX 637 (ALT 250 FOR IP): Performed by: EMERGENCY MEDICINE

## 2024-03-10 PROCEDURE — 83690 ASSAY OF LIPASE: CPT

## 2024-03-10 PROCEDURE — 6360000002 HC RX W HCPCS: Performed by: STUDENT IN AN ORGANIZED HEALTH CARE EDUCATION/TRAINING PROGRAM

## 2024-03-10 PROCEDURE — 96376 TX/PRO/DX INJ SAME DRUG ADON: CPT

## 2024-03-10 PROCEDURE — 80048 BASIC METABOLIC PNL TOTAL CA: CPT

## 2024-03-10 PROCEDURE — 96375 TX/PRO/DX INJ NEW DRUG ADDON: CPT

## 2024-03-10 PROCEDURE — 96361 HYDRATE IV INFUSION ADD-ON: CPT

## 2024-03-10 PROCEDURE — 83605 ASSAY OF LACTIC ACID: CPT

## 2024-03-10 PROCEDURE — 96374 THER/PROPH/DIAG INJ IV PUSH: CPT

## 2024-03-10 PROCEDURE — 6360000002 HC RX W HCPCS: Performed by: EMERGENCY MEDICINE

## 2024-03-10 PROCEDURE — 82800 BLOOD PH: CPT

## 2024-03-10 RX ORDER — 0.9 % SODIUM CHLORIDE 0.9 %
1000 INTRAVENOUS SOLUTION INTRAVENOUS ONCE
Status: COMPLETED | OUTPATIENT
Start: 2024-03-10 | End: 2024-03-10

## 2024-03-10 RX ORDER — FENTANYL CITRATE 50 UG/ML
25 INJECTION, SOLUTION INTRAMUSCULAR; INTRAVENOUS ONCE
Status: COMPLETED | OUTPATIENT
Start: 2024-03-10 | End: 2024-03-10

## 2024-03-10 RX ORDER — ONDANSETRON 2 MG/ML
4 INJECTION INTRAMUSCULAR; INTRAVENOUS ONCE
Status: COMPLETED | OUTPATIENT
Start: 2024-03-10 | End: 2024-03-10

## 2024-03-10 RX ADMIN — FENTANYL CITRATE 25 MCG: 50 INJECTION INTRAMUSCULAR; INTRAVENOUS at 19:56

## 2024-03-10 RX ADMIN — FENTANYL CITRATE 25 MCG: 50 INJECTION INTRAMUSCULAR; INTRAVENOUS at 21:34

## 2024-03-10 RX ADMIN — SODIUM CHLORIDE 1000 ML: 9 INJECTION, SOLUTION INTRAVENOUS at 21:24

## 2024-03-10 RX ADMIN — WATER 1000 MG: 1 INJECTION INTRAMUSCULAR; INTRAVENOUS; SUBCUTANEOUS at 22:15

## 2024-03-10 RX ADMIN — INSULIN HUMAN 8 UNITS: 100 INJECTION, SOLUTION PARENTERAL at 21:51

## 2024-03-10 RX ADMIN — ONDANSETRON 4 MG: 2 INJECTION INTRAMUSCULAR; INTRAVENOUS at 19:53

## 2024-03-10 RX ADMIN — HYDROMORPHONE HYDROCHLORIDE 0.5 MG: 1 INJECTION, SOLUTION INTRAMUSCULAR; INTRAVENOUS; SUBCUTANEOUS at 23:19

## 2024-03-10 RX ADMIN — SODIUM CHLORIDE 1000 ML: 9 INJECTION, SOLUTION INTRAVENOUS at 19:53

## 2024-03-10 ASSESSMENT — LIFESTYLE VARIABLES
HOW OFTEN DO YOU HAVE A DRINK CONTAINING ALCOHOL: NEVER
HOW MANY STANDARD DRINKS CONTAINING ALCOHOL DO YOU HAVE ON A TYPICAL DAY: 1 OR 2

## 2024-03-10 ASSESSMENT — PAIN DESCRIPTION - ORIENTATION
ORIENTATION: LEFT

## 2024-03-10 ASSESSMENT — PAIN SCALES - GENERAL
PAINLEVEL_OUTOF10: 10
PAINLEVEL_OUTOF10: 9
PAINLEVEL_OUTOF10: 10
PAINLEVEL_OUTOF10: 10

## 2024-03-10 ASSESSMENT — PAIN DESCRIPTION - LOCATION
LOCATION: ABDOMEN;BACK;FLANK
LOCATION: ABDOMEN;FLANK;BACK
LOCATION: ABDOMEN
LOCATION: ABDOMEN;BACK;FLANK

## 2024-03-10 ASSESSMENT — PAIN DESCRIPTION - DESCRIPTORS
DESCRIPTORS: BURNING
DESCRIPTORS: SHARP
DESCRIPTORS: SHARP

## 2024-03-10 NOTE — ED PROVIDER NOTES
CIWA Assessment  BP: 99/69  Pulse: 85           PHYSICAL EXAM  1 or more Elements     ED Triage Vitals [03/10/24 1900]   BP Temp Temp Source Pulse Respirations SpO2 Height Weight - Scale   (!) 147/66 98.2 °F (36.8 °C) Oral 91 18 100 % 1.549 m (5' 1\") 68 kg (150 lb)            Constitutional/General: Alert and oriented x3; overweight no acute distress  Head: Normocephalic and atraumatic  Eyes: PERRL, EOMI, conjunctiva normal, sclera non icteric  ENT:  Oropharynx clear, handling secretions, no trismus, no asymmetry of the posterior oropharynx or uvular edema  Neck: Supple, full ROM, no stridor, no meningeal signs  Respiratory: Lungs clear to auscultation bilaterally, no wheezes, rales, or rhonchi. Not in respiratory distress  Cardiovascular:  Regular rate. Regular rhythm. No murmurs, no gallops, no rubs. 2+ distal pulses. Equal extremity pulses.   Chest: No chest wall tenderness  GI:  Abdomen Soft, mild tenderness to left upper quadrant and epigastric area.  Non distended.  No rebound, guarding, or rigidity. No pulsatile masses no Horn sign..  Musculoskeletal: Moves all extremities x 4. Warm and well perfused, no clubbing, no cyanosis, no edema. Capillary refill <3 seconds  Integument: skin warm and dry. No rashes.   Neurologic: GCS 15, no focal deficits, symmetric strength 5/5 in the upper and lower extremities bilaterally  Psychiatric: Normal Affect            DIAGNOSTIC RESULTS   LABS:      I have personally reviewed and interpreted all laboratory and imaging results for this patient. Results are listed below.     LABS:  Results for orders placed or performed during the hospital encounter of 03/10/24   Culture, Blood 1    Specimen: Blood   Result Value Ref Range    Specimen Description .BLOOD     Special Requests          Culture NO GROWTH <24 HRS    Culture, Blood 2    Specimen: Blood   Result Value Ref Range    Specimen Description .BLOOD     Special Requests          Culture NO GROWTH <24 HRS    Respiratory  diverticulitis      Chronic conditions:  has a past medical history of Diabetes mellitus (HCC) and Hypertension.          ED Course Summary:(labs and imaging reviewed, interventions, reassessment, consults,shared decision making with patient, disposition)    CBC is ordered to evaluate for any signs of infection or inflammation by obtaining a WBC count, or any signs of acute anemia by interpreting hemoglobin. CMP for any electrolyte imbalances, kidney function, hepatic injury or any elevations in anion gap. Troponin as a marker for myocardial ischemia or heart strain. Urinalysis to evaluate for a UTI. Lipase to evaluate for pancreatitis. Lactic acid as a marker of hypoperfusion or ischemia. Beta-hydroxybutyrate to rule out DKA as it is a hydroxyacid known to be elevated with it. CT abdomen for, but without limitation to, ureterolithiasis, nephrolithiasis, constipation, hollow organ perforation, small bowel obstruction, bowel ischemia, pneumoperitoneum, diverticulitis, cholecystitis, appendicitis, intra-abdominal abscess, or malignancy.      Patient initially given a liter of IV fluids, 25 mcg of IV fentanyl  4 mg IV Zofran.  Vital signs are stable.  Laboratory lactic acid elevated 5.3.  Chemistry returned with a glucose of 197 KRANTHI with creatinine of 3.3 CO2 decreased at 16Sodium decreased to 129.  Venous pH was minimally decreased at 7.34 beta-hydroxybutyrate mildly elevated 2.9.  Send possibly early DKA.  Patient given 8 units of IV insulin after speaking to the intensivist.  We will complete 2 L of IV fluids as well as insulin reassess chemistry determine if he needs insulin drip in the ICU.  CBC showed white count elevated at 16.4.  No UTI on urinalysis.  Patient signed out to my colleague Dr. Shaw awaiting CT for the lab results and reassessment after fluids and insulin with repeat chemistry to  determine proper bed for admission.  Blood cultures were sent and CT abdomen pelvis shows a large stone 16 x 8 mm in

## 2024-03-11 ENCOUNTER — APPOINTMENT (OUTPATIENT)
Dept: GENERAL RADIOLOGY | Age: 69
DRG: 853 | End: 2024-03-11
Payer: MEDICARE

## 2024-03-11 ENCOUNTER — ANESTHESIA (OUTPATIENT)
Dept: OPERATING ROOM | Age: 69
End: 2024-03-11
Payer: MEDICARE

## 2024-03-11 ENCOUNTER — ANESTHESIA EVENT (OUTPATIENT)
Dept: OPERATING ROOM | Age: 69
End: 2024-03-11
Payer: MEDICARE

## 2024-03-11 PROBLEM — N20.0 KIDNEY STONE: Status: ACTIVE | Noted: 2024-03-11

## 2024-03-11 PROBLEM — E10.10 DKA, TYPE 1, NOT AT GOAL (HCC): Status: ACTIVE | Noted: 2024-03-11

## 2024-03-11 LAB
ANION GAP SERPL CALCULATED.3IONS-SCNC: 13 MMOL/L (ref 7–16)
ANION GAP SERPL CALCULATED.3IONS-SCNC: 14 MMOL/L (ref 7–16)
ANION GAP SERPL CALCULATED.3IONS-SCNC: 18 MMOL/L (ref 7–16)
ANION GAP SERPL CALCULATED.3IONS-SCNC: 22 MMOL/L (ref 7–16)
B PARAP IS1001 DNA NPH QL NAA+NON-PROBE: NOT DETECTED
B PERT DNA SPEC QL NAA+PROBE: NOT DETECTED
BASOPHILS # BLD: 0 K/UL (ref 0–0.2)
BASOPHILS NFR BLD: 0 % (ref 0–2)
BUN SERPL-MCNC: 67 MG/DL (ref 6–23)
BUN SERPL-MCNC: 67 MG/DL (ref 6–23)
BUN SERPL-MCNC: 68 MG/DL (ref 6–23)
BUN SERPL-MCNC: 74 MG/DL (ref 6–23)
C PNEUM DNA NPH QL NAA+NON-PROBE: NOT DETECTED
CALCIUM SERPL-MCNC: 6.8 MG/DL (ref 8.6–10.2)
CALCIUM SERPL-MCNC: 7.2 MG/DL (ref 8.6–10.2)
CALCIUM SERPL-MCNC: 7.4 MG/DL (ref 8.6–10.2)
CALCIUM SERPL-MCNC: 8 MG/DL (ref 8.6–10.2)
CHLORIDE SERPL-SCNC: 102 MMOL/L (ref 98–107)
CHLORIDE SERPL-SCNC: 105 MMOL/L (ref 98–107)
CHLORIDE SERPL-SCNC: 91 MMOL/L (ref 98–107)
CHLORIDE SERPL-SCNC: 98 MMOL/L (ref 98–107)
CO2 SERPL-SCNC: 15 MMOL/L (ref 22–29)
CO2 SERPL-SCNC: 16 MMOL/L (ref 22–29)
CREAT SERPL-MCNC: 2.6 MG/DL (ref 0.5–1)
CREAT SERPL-MCNC: 2.8 MG/DL (ref 0.5–1)
CREAT SERPL-MCNC: 2.8 MG/DL (ref 0.5–1)
CREAT SERPL-MCNC: 3 MG/DL (ref 0.5–1)
CRP SERPL HS-MCNC: 360 MG/L (ref 0–5)
EOSINOPHIL # BLD: 0 K/UL (ref 0.05–0.5)
EOSINOPHILS RELATIVE PERCENT: 0 % (ref 0–6)
ERYTHROCYTE [DISTWIDTH] IN BLOOD BY AUTOMATED COUNT: 13.1 % (ref 11.5–15)
ERYTHROCYTE [SEDIMENTATION RATE] IN BLOOD BY WESTERGREN METHOD: 109 MM/HR (ref 0–20)
FLUAV RNA NPH QL NAA+NON-PROBE: NOT DETECTED
FLUBV RNA NPH QL NAA+NON-PROBE: NOT DETECTED
GFR SERPL CREATININE-BSD FRML MDRD: 17 ML/MIN/1.73M2
GFR SERPL CREATININE-BSD FRML MDRD: 18 ML/MIN/1.73M2
GFR SERPL CREATININE-BSD FRML MDRD: 18 ML/MIN/1.73M2
GFR SERPL CREATININE-BSD FRML MDRD: 19 ML/MIN/1.73M2
GLUCOSE BLD-MCNC: 149 MG/DL (ref 74–99)
GLUCOSE BLD-MCNC: 158 MG/DL (ref 74–99)
GLUCOSE BLD-MCNC: 162 MG/DL (ref 74–99)
GLUCOSE BLD-MCNC: 162 MG/DL (ref 74–99)
GLUCOSE BLD-MCNC: 163 MG/DL (ref 74–99)
GLUCOSE BLD-MCNC: 165 MG/DL (ref 74–99)
GLUCOSE BLD-MCNC: 170 MG/DL (ref 74–99)
GLUCOSE BLD-MCNC: 187 MG/DL (ref 74–99)
GLUCOSE BLD-MCNC: 216 MG/DL (ref 74–99)
GLUCOSE BLD-MCNC: 219 MG/DL (ref 74–99)
GLUCOSE BLD-MCNC: 270 MG/DL (ref 74–99)
GLUCOSE BLD-MCNC: 295 MG/DL (ref 74–99)
GLUCOSE BLD-MCNC: 311 MG/DL (ref 74–99)
GLUCOSE BLD-MCNC: 324 MG/DL (ref 74–99)
GLUCOSE BLD-MCNC: 352 MG/DL (ref 74–99)
GLUCOSE BLD-MCNC: 419 MG/DL (ref 74–99)
GLUCOSE SERPL-MCNC: 165 MG/DL (ref 74–99)
GLUCOSE SERPL-MCNC: 190 MG/DL (ref 74–99)
GLUCOSE SERPL-MCNC: 323 MG/DL (ref 74–99)
GLUCOSE SERPL-MCNC: 431 MG/DL (ref 74–99)
HADV DNA NPH QL NAA+NON-PROBE: NOT DETECTED
HBA1C MFR BLD: 9.1 % (ref 4–5.6)
HBA1C MFR BLD: 9.1 % (ref 4–5.6)
HCOV 229E RNA NPH QL NAA+NON-PROBE: NOT DETECTED
HCOV HKU1 RNA NPH QL NAA+NON-PROBE: NOT DETECTED
HCOV NL63 RNA NPH QL NAA+NON-PROBE: NOT DETECTED
HCOV OC43 RNA NPH QL NAA+NON-PROBE: NOT DETECTED
HCT VFR BLD AUTO: 24.9 % (ref 34–48)
HGB BLD-MCNC: 8.6 G/DL (ref 11.5–15.5)
HMPV RNA NPH QL NAA+NON-PROBE: NOT DETECTED
HPIV1 RNA NPH QL NAA+NON-PROBE: NOT DETECTED
HPIV2 RNA NPH QL NAA+NON-PROBE: NOT DETECTED
HPIV3 RNA NPH QL NAA+NON-PROBE: NOT DETECTED
HPIV4 RNA NPH QL NAA+NON-PROBE: NOT DETECTED
LACTATE BLDV-SCNC: 1.6 MMOL/L (ref 0.5–2.2)
LACTATE BLDV-SCNC: 1.9 MMOL/L (ref 0.5–2.2)
LACTATE BLDV-SCNC: 5.3 MMOL/L (ref 0.5–2.2)
LYMPHOCYTES NFR BLD: 1.04 K/UL (ref 1.5–4)
LYMPHOCYTES RELATIVE PERCENT: 8 % (ref 20–42)
M PNEUMO DNA NPH QL NAA+NON-PROBE: NOT DETECTED
MAGNESIUM SERPL-MCNC: 1.2 MG/DL (ref 1.6–2.6)
MAGNESIUM SERPL-MCNC: 1.7 MG/DL (ref 1.6–2.6)
MAGNESIUM SERPL-MCNC: 1.9 MG/DL (ref 1.6–2.6)
MCH RBC QN AUTO: 29.6 PG (ref 26–35)
MCHC RBC AUTO-ENTMCNC: 34.5 G/DL (ref 32–34.5)
MCV RBC AUTO: 85.6 FL (ref 80–99.9)
MONOCYTES NFR BLD: 0.69 K/UL (ref 0.1–0.95)
MONOCYTES NFR BLD: 5 % (ref 2–12)
NEUTROPHILS NFR BLD: 87 % (ref 43–80)
NEUTS SEG NFR BLD: 11.57 K/UL (ref 1.8–7.3)
PH VENOUS: 7.35 (ref 7.35–7.45)
PHOSPHATE SERPL-MCNC: 1.7 MG/DL (ref 2.5–4.5)
PHOSPHATE SERPL-MCNC: 2.1 MG/DL (ref 2.5–4.5)
PHOSPHATE SERPL-MCNC: 2.8 MG/DL (ref 2.5–4.5)
PLATELET # BLD AUTO: 51 K/UL (ref 130–450)
PLATELET CONFIRMATION: NORMAL
PMV BLD AUTO: 11.3 FL (ref 7–12)
POTASSIUM SERPL-SCNC: 3.5 MMOL/L (ref 3.5–5)
POTASSIUM SERPL-SCNC: 3.6 MMOL/L (ref 3.5–5)
POTASSIUM SERPL-SCNC: 4 MMOL/L (ref 3.5–5)
POTASSIUM SERPL-SCNC: 4.7 MMOL/L (ref 3.5–5)
PROCALCITONIN SERPL-MCNC: >100 NG/ML (ref 0–0.08)
RBC # BLD AUTO: 2.91 M/UL (ref 3.5–5.5)
RBC # BLD: ABNORMAL 10*6/UL
RSV RNA NPH QL NAA+NON-PROBE: NOT DETECTED
RV+EV RNA NPH QL NAA+NON-PROBE: NOT DETECTED
SARS-COV-2 RNA NPH QL NAA+NON-PROBE: NOT DETECTED
SODIUM SERPL-SCNC: 129 MMOL/L (ref 132–146)
SODIUM SERPL-SCNC: 131 MMOL/L (ref 132–146)
SODIUM SERPL-SCNC: 132 MMOL/L (ref 132–146)
SODIUM SERPL-SCNC: 134 MMOL/L (ref 132–146)
SPECIMEN DESCRIPTION: NORMAL
WBC OTHER # BLD: 13.3 K/UL (ref 4.5–11.5)

## 2024-03-11 PROCEDURE — C1769 GUIDE WIRE: HCPCS | Performed by: UROLOGY

## 2024-03-11 PROCEDURE — 6360000002 HC RX W HCPCS

## 2024-03-11 PROCEDURE — 99222 1ST HOSP IP/OBS MODERATE 55: CPT | Performed by: INTERNAL MEDICINE

## 2024-03-11 PROCEDURE — 3700000001 HC ADD 15 MINUTES (ANESTHESIA): Performed by: UROLOGY

## 2024-03-11 PROCEDURE — 2580000003 HC RX 258: Performed by: STUDENT IN AN ORGANIZED HEALTH CARE EDUCATION/TRAINING PROGRAM

## 2024-03-11 PROCEDURE — 6360000002 HC RX W HCPCS: Performed by: UROLOGY

## 2024-03-11 PROCEDURE — 80048 BASIC METABOLIC PNL TOTAL CA: CPT

## 2024-03-11 PROCEDURE — 2709999900 HC NON-CHARGEABLE SUPPLY: Performed by: UROLOGY

## 2024-03-11 PROCEDURE — 83036 HEMOGLOBIN GLYCOSYLATED A1C: CPT

## 2024-03-11 PROCEDURE — 2580000003 HC RX 258: Performed by: INTERNAL MEDICINE

## 2024-03-11 PROCEDURE — 2000000000 HC ICU R&B

## 2024-03-11 PROCEDURE — 82962 GLUCOSE BLOOD TEST: CPT

## 2024-03-11 PROCEDURE — 83735 ASSAY OF MAGNESIUM: CPT

## 2024-03-11 PROCEDURE — 86140 C-REACTIVE PROTEIN: CPT

## 2024-03-11 PROCEDURE — 6360000002 HC RX W HCPCS: Performed by: NURSE PRACTITIONER

## 2024-03-11 PROCEDURE — 87088 URINE BACTERIA CULTURE: CPT

## 2024-03-11 PROCEDURE — 2580000003 HC RX 258: Performed by: NURSE PRACTITIONER

## 2024-03-11 PROCEDURE — 02HV33Z INSERTION OF INFUSION DEVICE INTO SUPERIOR VENA CAVA, PERCUTANEOUS APPROACH: ICD-10-PCS | Performed by: STUDENT IN AN ORGANIZED HEALTH CARE EDUCATION/TRAINING PROGRAM

## 2024-03-11 PROCEDURE — 6360000002 HC RX W HCPCS: Performed by: STUDENT IN AN ORGANIZED HEALTH CARE EDUCATION/TRAINING PROGRAM

## 2024-03-11 PROCEDURE — 6370000000 HC RX 637 (ALT 250 FOR IP): Performed by: STUDENT IN AN ORGANIZED HEALTH CARE EDUCATION/TRAINING PROGRAM

## 2024-03-11 PROCEDURE — C2617 STENT, NON-COR, TEM W/O DEL: HCPCS | Performed by: UROLOGY

## 2024-03-11 PROCEDURE — 87081 CULTURE SCREEN ONLY: CPT

## 2024-03-11 PROCEDURE — 74420 UROGRAPHY RTRGR +-KUB: CPT

## 2024-03-11 PROCEDURE — 83605 ASSAY OF LACTIC ACID: CPT

## 2024-03-11 PROCEDURE — 0T778DZ DILATION OF LEFT URETER WITH INTRALUMINAL DEVICE, VIA NATURAL OR ARTIFICIAL OPENING ENDOSCOPIC: ICD-10-PCS | Performed by: UROLOGY

## 2024-03-11 PROCEDURE — C1758 CATHETER, URETERAL: HCPCS | Performed by: UROLOGY

## 2024-03-11 PROCEDURE — 2500000003 HC RX 250 WO HCPCS: Performed by: UROLOGY

## 2024-03-11 PROCEDURE — 3700000000 HC ANESTHESIA ATTENDED CARE: Performed by: UROLOGY

## 2024-03-11 PROCEDURE — C9113 INJ PANTOPRAZOLE SODIUM, VIA: HCPCS | Performed by: NURSE PRACTITIONER

## 2024-03-11 PROCEDURE — 6360000002 HC RX W HCPCS: Performed by: NURSE ANESTHETIST, CERTIFIED REGISTERED

## 2024-03-11 PROCEDURE — 3600000013 HC SURGERY LEVEL 3 ADDTL 15MIN: Performed by: UROLOGY

## 2024-03-11 PROCEDURE — 2500000003 HC RX 250 WO HCPCS

## 2024-03-11 PROCEDURE — 85652 RBC SED RATE AUTOMATED: CPT

## 2024-03-11 PROCEDURE — 2500000003 HC RX 250 WO HCPCS: Performed by: STUDENT IN AN ORGANIZED HEALTH CARE EDUCATION/TRAINING PROGRAM

## 2024-03-11 PROCEDURE — 82800 BLOOD PH: CPT

## 2024-03-11 PROCEDURE — 0202U NFCT DS 22 TRGT SARS-COV-2: CPT

## 2024-03-11 PROCEDURE — 84145 PROCALCITONIN (PCT): CPT

## 2024-03-11 PROCEDURE — 6370000000 HC RX 637 (ALT 250 FOR IP): Performed by: INTERNAL MEDICINE

## 2024-03-11 PROCEDURE — 2580000003 HC RX 258: Performed by: NURSE ANESTHETIST, CERTIFIED REGISTERED

## 2024-03-11 PROCEDURE — 6360000002 HC RX W HCPCS: Performed by: SPECIALIST

## 2024-03-11 PROCEDURE — 85025 COMPLETE CBC W/AUTO DIFF WBC: CPT

## 2024-03-11 PROCEDURE — 36556 INSERT NON-TUNNEL CV CATH: CPT

## 2024-03-11 PROCEDURE — 2580000003 HC RX 258: Performed by: UROLOGY

## 2024-03-11 PROCEDURE — 84100 ASSAY OF PHOSPHORUS: CPT

## 2024-03-11 PROCEDURE — 71045 X-RAY EXAM CHEST 1 VIEW: CPT

## 2024-03-11 PROCEDURE — 3600000003 HC SURGERY LEVEL 3 BASE: Performed by: UROLOGY

## 2024-03-11 PROCEDURE — 2580000003 HC RX 258

## 2024-03-11 PROCEDURE — 2580000003 HC RX 258: Performed by: SPECIALIST

## 2024-03-11 PROCEDURE — 87040 BLOOD CULTURE FOR BACTERIA: CPT

## 2024-03-11 PROCEDURE — A4216 STERILE WATER/SALINE, 10 ML: HCPCS | Performed by: NURSE PRACTITIONER

## 2024-03-11 DEVICE — URETERAL STENT
Type: IMPLANTABLE DEVICE | Site: URETER | Status: FUNCTIONAL
Brand: PERCUFLEX™

## 2024-03-11 RX ORDER — SODIUM CHLORIDE 9 MG/ML
INJECTION, SOLUTION INTRAVENOUS CONTINUOUS PRN
Status: DISCONTINUED | OUTPATIENT
Start: 2024-03-11 | End: 2024-03-11 | Stop reason: SDUPTHER

## 2024-03-11 RX ORDER — SODIUM CHLORIDE, SODIUM LACTATE, POTASSIUM CHLORIDE, CALCIUM CHLORIDE 600; 310; 30; 20 MG/100ML; MG/100ML; MG/100ML; MG/100ML
INJECTION, SOLUTION INTRAVENOUS CONTINUOUS
Status: DISCONTINUED | OUTPATIENT
Start: 2024-03-11 | End: 2024-03-12

## 2024-03-11 RX ORDER — SODIUM CHLORIDE, SODIUM LACTATE, POTASSIUM CHLORIDE, CALCIUM CHLORIDE 600; 310; 30; 20 MG/100ML; MG/100ML; MG/100ML; MG/100ML
INJECTION, SOLUTION INTRAVENOUS CONTINUOUS
Status: DISCONTINUED | OUTPATIENT
Start: 2024-03-11 | End: 2024-03-11

## 2024-03-11 RX ORDER — POTASSIUM CHLORIDE 7.45 MG/ML
10 INJECTION INTRAVENOUS PRN
Status: DISCONTINUED | OUTPATIENT
Start: 2024-03-11 | End: 2024-03-12

## 2024-03-11 RX ORDER — DEXTROSE MONOHYDRATE, SODIUM CHLORIDE, AND POTASSIUM CHLORIDE 50; 1.49; 4.5 G/1000ML; G/1000ML; G/1000ML
INJECTION, SOLUTION INTRAVENOUS CONTINUOUS PRN
Status: DISCONTINUED | OUTPATIENT
Start: 2024-03-11 | End: 2024-03-12

## 2024-03-11 RX ORDER — ONDANSETRON 4 MG/1
4 TABLET, ORALLY DISINTEGRATING ORAL EVERY 8 HOURS PRN
Status: DISCONTINUED | OUTPATIENT
Start: 2024-03-11 | End: 2024-03-18 | Stop reason: HOSPADM

## 2024-03-11 RX ORDER — 0.9 % SODIUM CHLORIDE 0.9 %
1000 INTRAVENOUS SOLUTION INTRAVENOUS ONCE
Status: DISCONTINUED | OUTPATIENT
Start: 2024-03-11 | End: 2024-03-11

## 2024-03-11 RX ORDER — PHENYLEPHRINE HCL IN 0.9% NACL 1 MG/10 ML
SYRINGE (ML) INTRAVENOUS PRN
Status: DISCONTINUED | OUTPATIENT
Start: 2024-03-11 | End: 2024-03-11 | Stop reason: SDUPTHER

## 2024-03-11 RX ORDER — PROCHLORPERAZINE EDISYLATE 5 MG/ML
10 INJECTION INTRAMUSCULAR; INTRAVENOUS EVERY 6 HOURS PRN
Status: DISCONTINUED | OUTPATIENT
Start: 2024-03-11 | End: 2024-03-12

## 2024-03-11 RX ORDER — PROCHLORPERAZINE EDISYLATE 5 MG/ML
INJECTION INTRAMUSCULAR; INTRAVENOUS
Status: COMPLETED
Start: 2024-03-11 | End: 2024-03-11

## 2024-03-11 RX ORDER — SODIUM CHLORIDE 9 MG/ML
INJECTION, SOLUTION INTRAVENOUS CONTINUOUS
Status: DISCONTINUED | OUTPATIENT
Start: 2024-03-11 | End: 2024-03-12

## 2024-03-11 RX ORDER — ACETAMINOPHEN 325 MG/1
650 TABLET ORAL EVERY 6 HOURS PRN
Status: DISCONTINUED | OUTPATIENT
Start: 2024-03-11 | End: 2024-03-18 | Stop reason: HOSPADM

## 2024-03-11 RX ORDER — SODIUM CHLORIDE 0.9 % (FLUSH) 0.9 %
5-40 SYRINGE (ML) INJECTION PRN
Status: DISCONTINUED | OUTPATIENT
Start: 2024-03-11 | End: 2024-03-11

## 2024-03-11 RX ORDER — INSULIN LISPRO 100 [IU]/ML
0-4 INJECTION, SOLUTION INTRAVENOUS; SUBCUTANEOUS NIGHTLY
Status: DISCONTINUED | OUTPATIENT
Start: 2024-03-11 | End: 2024-03-18 | Stop reason: HOSPADM

## 2024-03-11 RX ORDER — 0.9 % SODIUM CHLORIDE 0.9 %
15 INTRAVENOUS SOLUTION INTRAVENOUS ONCE
Status: DISCONTINUED | OUTPATIENT
Start: 2024-03-11 | End: 2024-03-12

## 2024-03-11 RX ORDER — DIPHENHYDRAMINE HYDROCHLORIDE 50 MG/ML
12.5 INJECTION INTRAMUSCULAR; INTRAVENOUS
Status: DISCONTINUED | OUTPATIENT
Start: 2024-03-11 | End: 2024-03-11

## 2024-03-11 RX ORDER — NALOXONE HYDROCHLORIDE 0.4 MG/ML
INJECTION, SOLUTION INTRAMUSCULAR; INTRAVENOUS; SUBCUTANEOUS PRN
Status: DISCONTINUED | OUTPATIENT
Start: 2024-03-11 | End: 2024-03-11

## 2024-03-11 RX ORDER — SODIUM CHLORIDE 9 MG/ML
INJECTION, SOLUTION INTRAVENOUS PRN
Status: DISCONTINUED | OUTPATIENT
Start: 2024-03-11 | End: 2024-03-11

## 2024-03-11 RX ORDER — POLYETHYLENE GLYCOL 3350 17 G/17G
17 POWDER, FOR SOLUTION ORAL DAILY PRN
Status: DISCONTINUED | OUTPATIENT
Start: 2024-03-11 | End: 2024-03-18 | Stop reason: HOSPADM

## 2024-03-11 RX ORDER — SODIUM CHLORIDE 0.9 % (FLUSH) 0.9 %
5-40 SYRINGE (ML) INJECTION PRN
Status: DISCONTINUED | OUTPATIENT
Start: 2024-03-11 | End: 2024-03-18 | Stop reason: HOSPADM

## 2024-03-11 RX ORDER — SODIUM CHLORIDE 0.9 % (FLUSH) 0.9 %
5-40 SYRINGE (ML) INJECTION EVERY 12 HOURS SCHEDULED
Status: DISCONTINUED | OUTPATIENT
Start: 2024-03-11 | End: 2024-03-11

## 2024-03-11 RX ORDER — SODIUM CHLORIDE 9 MG/ML
INJECTION, SOLUTION INTRAVENOUS PRN
Status: DISCONTINUED | OUTPATIENT
Start: 2024-03-11 | End: 2024-03-18 | Stop reason: HOSPADM

## 2024-03-11 RX ORDER — SODIUM CHLORIDE 0.9 % (FLUSH) 0.9 %
5-40 SYRINGE (ML) INJECTION EVERY 12 HOURS SCHEDULED
Status: DISCONTINUED | OUTPATIENT
Start: 2024-03-11 | End: 2024-03-18 | Stop reason: HOSPADM

## 2024-03-11 RX ORDER — GABAPENTIN 300 MG/1
300 CAPSULE ORAL 2 TIMES DAILY
COMMUNITY

## 2024-03-11 RX ORDER — MAGNESIUM SULFATE IN WATER 40 MG/ML
2000 INJECTION, SOLUTION INTRAVENOUS PRN
Status: DISCONTINUED | OUTPATIENT
Start: 2024-03-11 | End: 2024-03-11

## 2024-03-11 RX ORDER — INSULIN LISPRO 100 [IU]/ML
0-4 INJECTION, SOLUTION INTRAVENOUS; SUBCUTANEOUS NIGHTLY
Status: DISCONTINUED | OUTPATIENT
Start: 2024-03-11 | End: 2024-03-11

## 2024-03-11 RX ORDER — INSULIN GLARGINE 100 [IU]/ML
16 INJECTION, SOLUTION SUBCUTANEOUS DAILY
Status: DISCONTINUED | OUTPATIENT
Start: 2024-03-11 | End: 2024-03-11

## 2024-03-11 RX ORDER — INSULIN LISPRO 100 [IU]/ML
0-4 INJECTION, SOLUTION INTRAVENOUS; SUBCUTANEOUS
Status: DISCONTINUED | OUTPATIENT
Start: 2024-03-11 | End: 2024-03-11

## 2024-03-11 RX ORDER — ONDANSETRON 2 MG/ML
4 INJECTION INTRAMUSCULAR; INTRAVENOUS EVERY 6 HOURS PRN
Status: DISCONTINUED | OUTPATIENT
Start: 2024-03-11 | End: 2024-03-18 | Stop reason: HOSPADM

## 2024-03-11 RX ORDER — FENTANYL CITRATE 50 UG/ML
25 INJECTION, SOLUTION INTRAMUSCULAR; INTRAVENOUS EVERY 5 MIN PRN
Status: DISCONTINUED | OUTPATIENT
Start: 2024-03-11 | End: 2024-03-11

## 2024-03-11 RX ORDER — ACETAMINOPHEN 650 MG/1
650 SUPPOSITORY RECTAL EVERY 6 HOURS PRN
Status: DISCONTINUED | OUTPATIENT
Start: 2024-03-11 | End: 2024-03-18 | Stop reason: HOSPADM

## 2024-03-11 RX ORDER — INSULIN LISPRO 100 [IU]/ML
0-12 INJECTION, SOLUTION INTRAVENOUS; SUBCUTANEOUS
Status: DISCONTINUED | OUTPATIENT
Start: 2024-03-11 | End: 2024-03-12

## 2024-03-11 RX ORDER — PROCHLORPERAZINE EDISYLATE 5 MG/ML
5 INJECTION INTRAMUSCULAR; INTRAVENOUS
Status: DISCONTINUED | OUTPATIENT
Start: 2024-03-11 | End: 2024-03-11

## 2024-03-11 RX ORDER — SODIUM CHLORIDE 9 MG/ML
INJECTION, SOLUTION INTRAVENOUS ONCE
Status: COMPLETED | OUTPATIENT
Start: 2024-03-11 | End: 2024-03-11

## 2024-03-11 RX ORDER — 0.9 % SODIUM CHLORIDE 0.9 %
1000 INTRAVENOUS SOLUTION INTRAVENOUS ONCE
Status: COMPLETED | OUTPATIENT
Start: 2024-03-11 | End: 2024-03-11

## 2024-03-11 RX ORDER — PROPOFOL 10 MG/ML
INJECTION, EMULSION INTRAVENOUS PRN
Status: DISCONTINUED | OUTPATIENT
Start: 2024-03-11 | End: 2024-03-11 | Stop reason: SDUPTHER

## 2024-03-11 RX ORDER — INSULIN GLARGINE 100 [IU]/ML
10 INJECTION, SOLUTION SUBCUTANEOUS NIGHTLY
Status: DISCONTINUED | OUTPATIENT
Start: 2024-03-11 | End: 2024-03-14

## 2024-03-11 RX ADMIN — POTASSIUM CHLORIDE 10 MEQ: 10 INJECTION, SOLUTION INTRAVENOUS at 12:35

## 2024-03-11 RX ADMIN — Medication 15 MMOL: at 09:53

## 2024-03-11 RX ADMIN — SODIUM CHLORIDE 1000 ML: 9 INJECTION, SOLUTION INTRAVENOUS at 07:03

## 2024-03-11 RX ADMIN — POTASSIUM CHLORIDE 10 MEQ: 10 INJECTION, SOLUTION INTRAVENOUS at 11:27

## 2024-03-11 RX ADMIN — MAGNESIUM SULFATE HEPTAHYDRATE 2000 MG: 40 INJECTION, SOLUTION INTRAVENOUS at 08:14

## 2024-03-11 RX ADMIN — POTASSIUM CHLORIDE 10 MEQ: 10 INJECTION, SOLUTION INTRAVENOUS at 16:07

## 2024-03-11 RX ADMIN — SODIUM CHLORIDE, PRESERVATIVE FREE 10 ML: 5 INJECTION INTRAVENOUS at 08:26

## 2024-03-11 RX ADMIN — PANTOPRAZOLE SODIUM 40 MG: 40 INJECTION, POWDER, FOR SOLUTION INTRAVENOUS at 08:26

## 2024-03-11 RX ADMIN — PROPOFOL 20 MG: 10 INJECTION, EMULSION INTRAVENOUS at 10:48

## 2024-03-11 RX ADMIN — POTASSIUM CHLORIDE 10 MEQ: 10 INJECTION, SOLUTION INTRAVENOUS at 08:54

## 2024-03-11 RX ADMIN — ONDANSETRON 4 MG: 2 INJECTION INTRAMUSCULAR; INTRAVENOUS at 21:02

## 2024-03-11 RX ADMIN — SODIUM CHLORIDE: 9 INJECTION, SOLUTION INTRAVENOUS at 10:45

## 2024-03-11 RX ADMIN — CEFTRIAXONE 1000 MG: 1 INJECTION, POWDER, FOR SOLUTION INTRAMUSCULAR; INTRAVENOUS at 02:20

## 2024-03-11 RX ADMIN — PROPOFOL 40 MG: 10 INJECTION, EMULSION INTRAVENOUS at 10:46

## 2024-03-11 RX ADMIN — Medication 100 MCG: at 10:58

## 2024-03-11 RX ADMIN — POTASSIUM CHLORIDE 10 MEQ: 10 INJECTION, SOLUTION INTRAVENOUS at 07:52

## 2024-03-11 RX ADMIN — SODIUM CHLORIDE: 9 INJECTION, SOLUTION INTRAVENOUS at 08:17

## 2024-03-11 RX ADMIN — POTASSIUM CHLORIDE 10 MEQ: 10 INJECTION, SOLUTION INTRAVENOUS at 06:35

## 2024-03-11 RX ADMIN — POTASSIUM CHLORIDE 10 MEQ: 10 INJECTION, SOLUTION INTRAVENOUS at 15:16

## 2024-03-11 RX ADMIN — PROCHLORPERAZINE EDISYLATE 10 MG: 5 INJECTION INTRAMUSCULAR; INTRAVENOUS at 09:39

## 2024-03-11 RX ADMIN — HYDROMORPHONE HYDROCHLORIDE 0.5 MG: 1 INJECTION, SOLUTION INTRAMUSCULAR; INTRAVENOUS; SUBCUTANEOUS at 15:17

## 2024-03-11 RX ADMIN — POTASSIUM CHLORIDE 10 MEQ: 10 INJECTION, SOLUTION INTRAVENOUS at 04:21

## 2024-03-11 RX ADMIN — PROPOFOL 150 MCG/KG/MIN: 10 INJECTION, EMULSION INTRAVENOUS at 10:47

## 2024-03-11 RX ADMIN — INSULIN HUMAN 10 UNITS: 100 INJECTION, SOLUTION PARENTERAL at 01:09

## 2024-03-11 RX ADMIN — Medication 100 MCG: at 11:00

## 2024-03-11 RX ADMIN — ONDANSETRON 4 MG: 2 INJECTION INTRAMUSCULAR; INTRAVENOUS at 06:20

## 2024-03-11 RX ADMIN — HYDROMORPHONE HYDROCHLORIDE 0.5 MG: 1 INJECTION, SOLUTION INTRAMUSCULAR; INTRAVENOUS; SUBCUTANEOUS at 07:14

## 2024-03-11 RX ADMIN — POTASSIUM CHLORIDE 10 MEQ: 10 INJECTION, SOLUTION INTRAVENOUS at 10:01

## 2024-03-11 RX ADMIN — SODIUM CHLORIDE 5.02 UNITS/HR: 9 INJECTION, SOLUTION INTRAVENOUS at 04:13

## 2024-03-11 RX ADMIN — SODIUM CHLORIDE: 9 INJECTION, SOLUTION INTRAVENOUS at 01:13

## 2024-03-11 RX ADMIN — SODIUM CHLORIDE: 9 INJECTION, SOLUTION INTRAVENOUS at 04:16

## 2024-03-11 RX ADMIN — POTASSIUM CHLORIDE, DEXTROSE MONOHYDRATE AND SODIUM CHLORIDE: 150; 5; 450 INJECTION, SOLUTION INTRAVENOUS at 16:09

## 2024-03-11 RX ADMIN — INSULIN GLARGINE 10 UNITS: 100 INJECTION, SOLUTION SUBCUTANEOUS at 17:33

## 2024-03-11 RX ADMIN — CEFEPIME 2000 MG: 2 INJECTION, POWDER, FOR SOLUTION INTRAVENOUS at 13:23

## 2024-03-11 RX ADMIN — POTASSIUM CHLORIDE 10 MEQ: 10 INJECTION, SOLUTION INTRAVENOUS at 13:41

## 2024-03-11 RX ADMIN — HYDROMORPHONE HYDROCHLORIDE 0.5 MG: 1 INJECTION, SOLUTION INTRAMUSCULAR; INTRAVENOUS; SUBCUTANEOUS at 20:58

## 2024-03-11 RX ADMIN — SODIUM CHLORIDE, PRESERVATIVE FREE 10 ML: 5 INJECTION INTRAVENOUS at 22:26

## 2024-03-11 RX ADMIN — POTASSIUM CHLORIDE 10 MEQ: 10 INJECTION, SOLUTION INTRAVENOUS at 05:43

## 2024-03-11 RX ADMIN — SODIUM CHLORIDE, POTASSIUM CHLORIDE, SODIUM LACTATE AND CALCIUM CHLORIDE: 600; 310; 30; 20 INJECTION, SOLUTION INTRAVENOUS at 18:28

## 2024-03-11 RX ADMIN — POTASSIUM CHLORIDE, DEXTROSE MONOHYDRATE AND SODIUM CHLORIDE: 150; 5; 450 INJECTION, SOLUTION INTRAVENOUS at 09:24

## 2024-03-11 ASSESSMENT — PAIN DESCRIPTION - LOCATION
LOCATION: ABDOMEN
LOCATION: ABDOMEN
LOCATION: ABDOMEN;BACK

## 2024-03-11 ASSESSMENT — PAIN SCALES - GENERAL
PAINLEVEL_OUTOF10: 0
PAINLEVEL_OUTOF10: 0
PAINLEVEL_OUTOF10: 3
PAINLEVEL_OUTOF10: 7
PAINLEVEL_OUTOF10: 3
PAINLEVEL_OUTOF10: 0
PAINLEVEL_OUTOF10: 4

## 2024-03-11 ASSESSMENT — PAIN DESCRIPTION - DESCRIPTORS: DESCRIPTORS: ACHING

## 2024-03-11 ASSESSMENT — LIFESTYLE VARIABLES: SMOKING_STATUS: 0

## 2024-03-11 ASSESSMENT — PAIN DESCRIPTION - ORIENTATION: ORIENTATION: LEFT

## 2024-03-11 NOTE — ANESTHESIA POSTPROCEDURE EVALUATION
Department of Anesthesiology  Postprocedure Note    Patient: Sravanthi Lr  MRN: [id number]  YOB: 1955  Date of evaluation: 3/11/2024    Procedure Summary       Date: 03/11/24 Room / Location: [address]    Anesthesia Start: 1038 Anesthesia Stop:     Procedure: CYSTOSCOPY RETROGRADE PYELOGRAM LEFT STENT INSERTION (Left) Diagnosis:       Renal calculus, left      (Renal calculus, left [N20.0])    Surgeons: Shawn Boykin MD Responsible Provider: Chris Fournier Jr., MD    Anesthesia Type: MAC ASA Status: 4            Anesthesia Type: No value filed.    Carly Phase I:      Carly Phase II:      Anesthesia Post Evaluation    Patient location during evaluation: ICU  Patient participation: complete - patient participated  Level of consciousness: awake  Airway patency: patent  Nausea & Vomiting: no nausea and no vomiting  Cardiovascular status: hemodynamically stable  Respiratory status: acceptable  Hydration status: euvolemic  Pain management: adequate        No notable events documented.

## 2024-03-11 NOTE — OP NOTE
Operative Note      Patient: Sravanthi Lr  YOB: 1955  MRN: [id number]    Date of Procedure: 3/11/2024    Pre-Op Diagnosis Codes:     * Renal calculus, left [N20.0], early sepsis    Post-Op Diagnosis: Same       Procedure(s):  CYSTOSCOPY RETROGRADE PYELOGRAM LEFT STENT INSERTION    Surgeon(s):  Shawn Boykin MD    Assistant:   * No surgical staff found *    Anesthesia: Monitor Anesthesia Care    Estimated Blood Loss (mL): Minimal    Complications: None    Specimens:   ID Type Source Tests Collected by Time Destination   1 : LEFT KIDNEY CULTURE Urine Urine, Cystoscopic CULTURE, URINE Shawn Boykin MD 3/11/2024 1101        Implants:  Implant Name Type Inv. Item Serial No.  Lot No. LRB No. Used Action   STENT PERCUFLEX  4.8 X 24CM STYLET NOT INCLUDED - [id number]  STENT PERCUFLEX  4.8 X 24CM STYLET NOT INCLUDED  [employer] [facility]- [id number] Left 1 Implanted         Drains:   Urinary Catheter 03/11/24 2 Way (Active)           INDICATION FOR PROCEDURE: Sravanthi Lr is a 68 y.o. who  was found to have a ureteral stone with hydronephrosis and signs of sepsis including elevated blood sugars.  She is admitted to the intensive care unit for diabetic ketoacidosis.  The patient is to undergo cystoscopy with stent insertion urgently.  She understands the risks, benefits, alternatives of the procedure as well as the fact that the stone will not be treated today, signed informed consent and agrees to proceed.    PROCEDURE:   The patient was brought into the operating room and placed under anesthesia in the dorsal lithotomy position.  She was prepped and draped in a sterile fashion. A 21-Algerian cystoscope with a 30-degree lens was passed through the urethra and into the bladder.  The entire length of the urethra was examined and found to be without strictures or other abnormalities. The entire bladder mucosal surface was examined under 30-degree endoscopy and found to be without calculi,

## 2024-03-11 NOTE — CONSULTS
ENDOCRINOLOGY INITIAL CONSULTATION NOTE      Date of admission: 3/10/2024  Date of service: 3/11/2024  Admitting physician: Pascual Mcfarland DO   Primary Care Physician: Pascual Mcfarland DO  Consultant physician: Bo Vazquez MD     Reason for the consultation:  Uncontrolled DM    History of Present Illness:  The history is provided by the patient. Accuracy of the patient data is excellent    Sravanthi Lr is a very pleasant 68 y.o. old female with PMH of T2DM, HTN and other listed below admitted [facility]/10/2024 because of N/V and flank pain and found to be in DKA and has an obstructive kidney stone, endocrine service was consulted for diabetes management.      Prior to admission  The patient was diagnosed with type 2 DM long time ago. Prior to admission patient was on metformin 500 mg twice daily, Januvia 50 mg daily, glimepiride 2 mg daily. Patient has had no hypoglycemic episodes. Patient has not been eating consistent carbohydrate meals, self-blood glucose monitoring has been above goal prior to admission. In addition, patient denied macrovascular or microvascular complications. The patient is not up to date with yearly diabetic eye exam.   Lab Results   Component Value Date/Time    LABA1C 9.1 03/11/2024 06:45 AM       Inpatient diet:   Carb Restricted diet     Point of care glucose monitoring   (Independently reviewed)   Recent Labs     03/11/24  0915 03/11/24  0956 03/11/24  1120 03/11/24  1211 03/11/24  1318 03/11/24  1411 03/11/24  1505 03/11/24  1611   POCGLU 219* 216* 187* 162* 162* 149* 165* 170*       Past medical history:   Past Medical History:   Diagnosis Date    Diabetes mellitus (HCC)     poorly controlled    Hypertension        Past surgical history:  Past Surgical History:   Procedure Laterality Date    APPENDECTOMY      CARPAL TUNNEL RELEASE      COLONOSCOPY N/A 7/19/2019    COLONOSCOPY DIAGNOSTIC performed by VARINDER Khan MD at [facility] ENDOSCOPY    DEBRIDEMENT Left 3/11/2024    CYSTOSCOPY  Date/Time    LABA1C 9.1 03/11/2024 06:45 AM    LABA1C 9.1 03/11/2024 05:40 AM    LABA1C 4.7 10/24/2022 12:00 PM     No results found for: \"TSH\", \"T4FREE\", \"P2HWZYK\", \"FT3\", \"S6SPXKA\", \"TSI\", \"TPOABS\", \"THGAB\"  Lab Results   Component Value Date/Time    LABA1C 9.1 03/11/2024 06:45 AM    GLUCOSE 165 03/11/2024 02:17 PM     No results found for: \"TRIG\", \"HDL\", \"LDLCALC\", \"CHOL\"    Blood culture   No results found for: \"BC\"    Radiology:  FL RETROGRADE PYELOGRAM W WO KUB   Final Result   Intraprocedural fluoroscopic spot images as above.  See separate procedure   report for more information.         XR CHEST 1 VIEW   Final Result   Placement of a deep line catheter on the right tip in the SVC.  Lung volumes   are low otherwise grossly clear with no pneumothorax.         CT ABDOMEN PELVIS WO CONTRAST Additional Contrast? None   Final Result   Presence of a 16 x 8 mm vertical oriented the calculus in the most proximal   left ureter, just below the level of the ureter pelvic junction (UPJ) causing   moderate to severe acute obstructive uropathy to the left kidney.         XR ABDOMEN (KUB) (SINGLE AP VIEW)    (Results Pending)       Medical Records/Labs/Images review:   I personally reviewed and summarized previous records   All labs and imaging were reviewed independently     ASSESSMENT & PLAN   Sravanthi Lr, a 68 y.o.-old female seen today for inpatient diabetes management    Diabetes Mellitus type 2 admitted with DKA  Patient's diabetes is uncontrolled, A1c 9.1%  The patient is currently on insulin drip as per DKA protocol  We recommend the following insulin bridging  Lantus 10 units daily  Medium dose sliding scale ACHS  We will add prandial insulin coverage once p.o. intake improves  Continue glucose check with meals and at bedtime  Will titrate insulin dose based on the blood glucose trend & insulin requirement  Upon discharge, I will arrange for the patient to be seen in the endocrinology clinic for routine

## 2024-03-11 NOTE — PROGRESS NOTES
Diabetes Education consult noted. Chart reviewed. Spoke with RN. Attempted to visit patient this afternoon.   Patient is not currently appropriate for Diabetes Education at this time. Will attempt to provide education tomorrow.

## 2024-03-11 NOTE — ANESTHESIA PRE PROCEDURE
Department of Anesthesiology  Preprocedure Note       Name:  Sravanthi Lr   Age:  68 y.o.  :  1955                                          MRN:  [id number]         Date:  3/11/2024      Surgeon: Surgeon(s):  Shawn Boykin MD    Procedure: Procedure(s):  CYSTOSCOPY RETROGRADE PYELOGRAM LEFT STENT INSERTION    Medications prior to admission:   Prior to Admission medications    Medication Sig Start Date End Date Taking? Authorizing Provider   febuxostat (ULORIC) 40 MG TABS tablet TAKE 1 TABLET BY MOUTH EVERY DAY 23   Jean Choi DO   colchicine (COLCRYS) 0.6 MG tablet Take 1 tablet by mouth 2 times daily 23   Jean Choi DO   losartan (COZAAR) 100 MG tablet Take 1 tablet by mouth daily    Beti Gamboa MD   naproxen (NAPROSYN) 500 MG tablet Take 1 tablet by mouth 2 times daily as needed for Pain (with meals) 5/24/23 6/3/23  Zane Tran MD   glimepiride (AMARYL) 2 MG tablet Take 1 tablet by mouth every morning (before breakfast)    Beti Gamboa MD   SITagliptin (JANUVIA) 50 MG tablet Take 1 tablet by mouth daily    Beti Gamboa MD   bisoprolol-hydrochlorothiazide (ZIAC) 10-6.25 MG per tablet Take 1 tablet by mouth daily    Beti Gamboa MD   metformin (GLUCOPHAGE) 500 MG tablet Take 1 tablet by mouth 2 times daily (with meals)    Beti Gamboa MD       Current medications:    Current Facility-Administered Medications   Medication Dose Route Frequency Provider Last Rate Last Admin   • dextrose bolus 10% 125 mL  125 mL IntraVENous PRN Adriel Shaw MD        Or   • dextrose bolus 10% 250 mL  250 mL IntraVENous PRN Adriel Shaw MD       • potassium chloride 10 mEq/100 mL IVPB (Peripheral Line)  10 mEq IntraVENous PRN Adriel Shaw  mL/hr at 24 0752 10 mEq at 24 0752   • magnesium sulfate 2000 mg in 50 mL IVPB premix  2,000 mg IntraVENous PRN Adriel Shaw MD       • dextrose 5 % and 0.45 % NaCl with KCl 20

## 2024-03-11 NOTE — CONSULTS
injection  40 mg IntraVENous Daily    cefTRIAXone (ROCEPHIN) IV  2,000 mg IntraVENous Q24H     Continuous Infusions:   dextrose 5% and 0.45% NaCl with KCl 20 mEq 150 mL/hr at 03/11/24 1038    insulin 7.62 Units/hr (03/11/24 1123)    sodium chloride Stopped (03/11/24 1049)    sodium chloride       PRN Meds:dextrose bolus **OR** dextrose bolus, potassium chloride, dextrose 5% and 0.45% NaCl with KCl 20 mEq, sodium chloride flush, sodium chloride, ondansetron **OR** ondansetron, polyethylene glycol, acetaminophen **OR** acetaminophen, HYDROmorphone, sodium phosphate 15 mmol in sodium chloride 0.9 % 250 mL IVPB, prochlorperazine    Allergies:  Patient has no known allergies.    Social History:   Social History     Socioeconomic History    Marital status: Single   Tobacco Use    Smoking status: Never    Smokeless tobacco: Never   Vaping Use    Vaping Use: Never used   Substance and Sexual Activity    Alcohol use: No    Drug use: Never    Sexual activity: Not Currently     Social Determinants of Health     Food Insecurity: No Food Insecurity (3/11/2024)    Hunger Vital Sign     Worried About Running Out of Food in the Last Year: Never true     Ran Out of Food in the Last Year: Never true   Transportation Needs: No Transportation Needs (3/11/2024)    PRAPARE - Transportation     Lack of Transportation (Medical): No     Lack of Transportation (Non-Medical): No   Housing Stability: Low Risk  (3/11/2024)    Housing Stability Vital Sign     Unable to Pay for Housing in the Last Year: No     Number of Places Lived in the Last Year: 1     Unstable Housing in the Last Year: No      Pets: None  Travel: No  The patient lives alone    Family History:       Problem Relation Age of Onset    Rheum Arthritis Mother     Dementia Mother     High Blood Pressure Father     Parkinsonism Father     High Blood Pressure Sister    . Otherwise non-pertinent to the chief complaint.    REVIEW OF SYSTEMS:    Constitutional: Negative for fevers,  chills, diaphoresis  Neurologic: Negative   Psychiatric: Negative  Rheumatologic: Negative   Endocrine: Negative  Hematologic: Negative  Immunologic: Negative  ENT: Negative  Respiratory: Negative   Cardiovascular: Negative  GI: As in the HPI  : As in the HPI  Musculoskeletal: Negative  Skin: No rashes.     PHYSICAL EXAM:    Vitals:   BP 98/63   Pulse 84   Temp 98.2 °F (36.8 °C) (Axillary)   Resp 28   Ht 1.549 m (5' 1\")   Wt 73.5 kg (162 lb 0.6 oz)   SpO2 100%   BMI 30.62 kg/m²   Constitutional: The patient is awake, alert, and oriented.  Lying in bed in the ICU.  She is in no distress but appears to be acutely ill and fatigued.  Skin: Warm and dry. No rashes were noted.   HEENT: Eyes show round, and reactive pupils. No jaundice. Moist mucous membranes, no ulcerations, no thrush.   Neck: Supple to movements. No lymphadenopathy.   Chest: No use of accessory muscles to breathe. Symmetrical expansion. Auscultation reveals no wheezing, crackles, or rhonchi.   Cardiovascular: S1 and S2 are rhythmic and regular. No murmurs appreciated.   Abdomen: Positive bowel sounds to auscultation. Benign to palpation. No masses felt. No hepatosplenomegaly.  Positive left CVA tenderness.  Extremities: No clubbing, no cyanosis, no edema.  Lines: Peripheral. Right IJ TLC 3/11/2024   Long catheter with bloody urine.    Lab Results   Component Value Date    .0 (H) 03/11/2024    CRP 0.3 06/23/2023    CRP <0.3 05/25/2023      No results found for: \"CRPHS\"  Lab Results   Component Value Date    SEDRATE 109 (H) 03/11/2024    SEDRATE 54 (H) 06/23/2023    SEDRATE 55 (H) 05/25/2023       Radiology:  Noted    Microbiology:  Pending  Recent Labs     03/11/24  0645   PROCAL >100.00*       Assessment:  Complicated UTI with sepsis associated to left ureteral stone obstruction  Hypotension secondary to sepsis  Leukocytosis associated to the above  Acute kidney injury  DKA    Plan:    Change Ceftriaxone to Cefepime  Check final urine

## 2024-03-11 NOTE — ED PROVIDER NOTES
Central Line    Date/Time: 3/11/2024 3:29 AM    Performed by: Adriel Shaw MD  Authorized by: Pascual Mcfarland DO    Consent:     Consent obtained:  Verbal    Consent given by:  Patient    Risks, benefits, and alternatives were discussed: yes      Risks discussed:  Arterial puncture, bleeding, incorrect placement, infection, nerve damage and pneumothorax    Alternatives discussed:  No treatment  Universal protocol:     Procedure explained and questions answered to patient or proxy's satisfaction: yes      Relevant documents present and verified: yes      Test results available: yes      Imaging studies available: yes      Required blood products, implants, devices, and special equipment available: yes      Site/side marked: yes      Immediately prior to procedure, a time out was called: yes      Patient identity confirmed:  Verbally with patient, hospital-assigned identification number and arm band  Pre-procedure details:     Indication(s): central venous access and insufficient peripheral access      Hand hygiene: Hand hygiene performed prior to insertion      Sterile barrier technique: All elements of maximal sterile technique followed      Skin preparation agent: Skin preparation agent completely dried prior to procedure    Sedation:     Sedation type:  None  Anesthesia:     Anesthesia method:  Local infiltration    Local anesthetic:  Lidocaine 1% w/o epi  Procedure details:     Location:  R internal jugular    Patient position:  Supine    Procedural supplies:  Triple lumen    Catheter size:  7 Fr    Landmarks identified: yes      Ultrasound guidance: yes      Ultrasound guidance timing: prior to insertion and real time      Sterile ultrasound techniques: Sterile gel and sterile probe covers were used      Number of attempts:  1    Successful placement: yes    Post-procedure details:     Post-procedure:  Dressing applied and line sutured    Assessment:  Blood return through all ports, free fluid flow, no

## 2024-03-11 NOTE — H&P
5         [place] [address]                           HISTORY & PHYSICAL      PATIENT NAME: YINKA REYES               : 1955  MED REC NO: [id number]                        ROOM: [id number]  ACCOUNT NO: [id number]                       ADMIT DATE: 03/10/2024  PROVIDER: Pascual Mcfarland DO      HISTORY OF PRESENT ILLNESS:  This is a 68-year-old white female with longstanding history of diabetes, poorly controlled, recently started on Mounjaro to help lower her blood sugar and she took 2 shots and was feeling pretty much okay.  Then, over the weekend, developed left flank pain and left abdominal pain, sharp in nature, colicky in nature and then radiated down into her left groin, also associated with this was intractable nausea and vomiting.  She was unable to keep anything down.  She presented to the Emergency Department for evaluation.  CT scan of the abdomen showed a rather large obstructing stone in the left ureter just below the level of the ureteropelvic junction causing moderate-to-severe right obstructive uropathy and hydronephrosis.  She was also noted to have an elevated blood sugar associated with elevated beta-hydroxybutyrate, consistent with possibility of diabetic ketoacidosis.  She was admitted into the intensive care unit.  Also noted was acute kidney failure with a creatinine of 3 and BUN of 70.  She is going to get started on some fluids, put on an insulin drip, get urological consult, likely have a stent placed.    PAST MEDICAL HISTORY:  Significant for GI bleed; chronic gout with tophi; generalized osteoarthritis; degenerative disk disease; chronic kidney disease; type 2 diabetes, poorly controlled.    PAST SURGICAL HISTORY:  Consistent with appendectomy; carpal tunnel release; colonoscopy; fracture surgery; knee surgery, arthroscopic; upper GIs.    RECENT CURRENT MEDICATIONS:  Bisoprolol/hydrochlorothiazide 5/6.25,

## 2024-03-11 NOTE — PROGRESS NOTES
SPIRITUAL HEALTH SERVICES - JIAN Cast Encounter    Name: Sravanthi Lr                  Referral: Routine Visit    Sacraments  Anointed (Last Rites): Yes  Apostolic [place]: No  Confession: No  Communion: NPO     Assessment:  Patient receptive to  visit.      Intervention:   provided spiritual support and sacramental ministry for patient.     Outcome:  Patient expressed gratitude for visit.    Plan:  Chaplains will remain available to offer spiritual and emotional support as needed.      Electronically signed by Chaplain Vanessa, on 3/11/2024 at 1:24 PM.  Spiritual Care Department  [facility]  [phone]

## 2024-03-11 NOTE — CONSULTS
[facility], [facility].  [address] [address]  [phone]  FAX [phone]        REASON FOR CONSULTATION:      16 mm left UPJ stone    HISTORY OF PRESENT ILLNESS:      The patient is a 68 y.o. female patient who presents with flank pain.  She was found to have uncontrolled blood sugars and was admitted to the intensive care unit for insulin drip.  She is currently having pain.  Her daughter states that she has been \"stubborn \"failing to come to the hospital sooner.      Past Medical History:   Diagnosis Date    Diabetes mellitus (HCC)     poorly controlled    Hypertension          Past Surgical History:   Procedure Laterality Date    APPENDECTOMY      CARPAL TUNNEL RELEASE      COLONOSCOPY N/A 7/19/2019    COLONOSCOPY DIAGNOSTIC performed by VARINDER Khan MD at [facility] ENDOSCOPY    FRACTURE SURGERY      rt ankle    KNEE SURGERY      arthroscopic    UPPER GASTROINTESTINAL ENDOSCOPY N/A 7/16/2019    EGD CONTROL HEMORRHAGE performed by VARINDER Khan MD at [facility] ENDOSCOPY    UPPER GASTROINTESTINAL ENDOSCOPY N/A 7/18/2019    EGD CONTROL HEMORRHAGE with BIPOLAR CAUTERY performed by Florencio Molina DO at [facility] ENDOSCOPY       Medications Prior to Admission:    Medications Prior to Admission: gabapentin (NEURONTIN) 300 MG capsule, Take 1 capsule by mouth 2 times daily.  febuxostat (ULORIC) 40 MG TABS tablet, TAKE 1 TABLET BY MOUTH EVERY DAY (Patient not taking: Reported on 3/11/2024)  colchicine (COLCRYS) 0.6 MG tablet, Take 1 tablet by mouth 2 times daily (Patient not taking: Reported on 3/11/2024)  losartan (COZAAR) 100 MG tablet, Take 1 tablet by mouth daily  naproxen (NAPROSYN) 500 MG tablet, Take 1 tablet by mouth 2 times daily as needed for Pain (with meals)  glimepiride (AMARYL) 2 MG tablet, Take 1 tablet by mouth every morning (before breakfast)  SITagliptin (JANUVIA) 50 MG tablet, Take 1 tablet by mouth daily  bisoprolol-hydrochlorothiazide (ZIAC) 10-6.25 MG per tablet, Take 1

## 2024-03-11 NOTE — PROGRESS NOTES
Critical Care Team - Daily Progress Note      Date and time: 3/11/2024 3:53 PM  Patient's name:  Sravanthi Lr  Medical Record Number: [id number]  Patient's account/billing number: [id number]  Patient's YOB: 1955  Age: 68 y.o.  Date of Admission: 3/10/2024  6:51 PM  Length of stay during current admission: 0    Primary Care Physician: Pascual Mcfarland DO  ICU Attending Physician: Dr. Sher    Code Status: Full Code    Reason for ICU admission: DKA and Obstructive Uropathy    SUBJECTIVE:     OVERNIGHT EVENTS: Patient mated from the ED this a.m.  Patient had no other acute events this a.m.  Patient seen and examined at bedside post OR today.  Patient had left ureteral stent placed by urology.  Patient is complaining of back pain states this is minimal at this time.  Patient does have Long catheter in place with urine production at this time.  Patient states she is nauseous and is requesting water but does not want a full diet at this time.  Patient denies any other complaints.    Intake/Output:   I/O this shift:  In: 300 [I.V.:300]  Out: 230 [Urine:230]  No intake/output data recorded.    Awake and following commands: Yes  Current Ventilation: - No ventilator support  Vasopressors: None    ROS:  Unless stated above, ROS is otherwise negative.    Initial HPI + past overnight events:     Ms. Lr presented to the ED today for evaluation of left abdominal and flank pain, emesis/diarrhea approximately 8 episodes of emesis and 2 episodes of diarrhea, and hyperglycemia that started on Friday. Medical history is significant for type 2 diabetes with recent alteration in her diabetic regimen a few weeks ago and started on mounjaro receiving 2 doses previously on jenuvia; gout on uloric, cholchicine - does not tolerate allopurinol which caused hair loss, hypertension, stage 3 ckd, and djd/osteoarthritis. She reports her blood sugar typically runs in the 110s on home checks and was concerned when her    Social/Spiritual/DNR/Other   Code status: Full Code  Diet Diet NPO Exceptions are: Sips of Water with Meds, Sips of Clear Liquids  Stress ulcer prophylaxis: Protonix  DVT prophylaxis: intermittent pneumatic compression boots with heparin being held due to OR today  Consultations needed: Yes  Transfer out of ICU today? No    Lines/catheters  Right IJ Triple Lumen 3/11  Long 3/11    Patient remains critically ill and requiring ICU level care.    Joseph Zambrano MD  3:53 PM  03/11/24    I personally saw, examined and provided care for the patient. I performed the substantive portion of the visit. Radiographs, labs and medication list were reviewed by me independently. Review of Residents documentation was conducted and revisions were made as appropriate. I agree with the above documented exam, problem list and plan of care.        CCT excluding procedures 37 minutes    Javan Sher, DO

## 2024-03-11 NOTE — ACP (ADVANCE CARE PLANNING)
Advance Care Planning   Healthcare Decision Maker: No ACP documents scanned into the EMR.    Contacts listed:   Bev Hernandez: [phone]    Cassandra Main: [phone].

## 2024-03-11 NOTE — ED NOTES
Attempted to call report to ICU, was informed that they will return my call for report when available.

## 2024-03-11 NOTE — CONSULTS
Critical Care Admit/Consult Note    Patient - Sravanthi Lr   MRN -  [id number]   [id number]   - 1955      Date of Admission -  3/10/2024  6:51 PM  Date of evaluation -  3/10/2024  14/14   Hospital Day - 0    ADMIT/CONSULT DETAILS     Reason for Admit/Consult   DKA, UTI    Consulting Service/Physician   Consulting - Kristen Fleming MD  Primary Care Physician - Pascual Mcfarland,          HPI   Ms. Lr presented to the ED today for evaluation of left abdominal and flank pain, emesis/diarrhea approximately 8 episodes of emesis and 2 episodes of diarrhea, and hyperglycemia that started on Friday. Medical history is significant for type 2 diabetes with recent alteration in her diabetic regimen a few weeks ago and started on mounjaro receiving 2 doses previously on jenuvia; gout on uloric, cholchicine - does not tolerate allopurinol which caused hair loss, hypertension, stage 3 ckd, and djd/osteoarthritis. She reports her blood sugar typically runs in the 110s on home checks and was concerned when her glucose climbed to 377 which is abnormally high for her.    In the ED she presented with serum blood glucose 497, AG  27, ph 7.30 bicarb 16, beta hydroxy 2.94, UA showed glycosuria/ketonuria. She was bolused with IV insulin and started on insulin infusion with likely DKA. A bolus of 3 L was given. CT AP WO contrast was done showing the presence of a severe acute obstructing 16 x 8 mm left sided kidney stone and started on 2 gm ceftriaxone. Dr. Keys was consulted in the ED. She is admitted to the ICU for further management of DKA, sepsis.    Past Medical History         Diagnosis Date    Diabetes mellitus (HCC)     poorly controlled    Hypertension         Past Surgical History           Procedure Laterality Date    APPENDECTOMY      CARPAL TUNNEL RELEASE      COLONOSCOPY N/A 2019    COLONOSCOPY DIAGNOSTIC performed by VARINDER Khan MD at [facility] ENDOSCOPY    FRACTURE SURGERY      rt ankle    KNEE  SURGERY      arthroscopic    UPPER GASTROINTESTINAL ENDOSCOPY N/A 2019    EGD CONTROL HEMORRHAGE performed by VARINDER Khan MD at [facility] ENDOSCOPY    UPPER GASTROINTESTINAL ENDOSCOPY N/A 2019    EGD CONTROL HEMORRHAGE with BIPOLAR CAUTERY performed by Florencio Molina DO at [facility] ENDOSCOPY     Current Medications   Current Medications       IV Drips/Infusions    Home Medications  Not in a hospital admission.    Diet/Nutrition   No diet orders on file    Allergies   Patient has no known allergies.    Social History   Tobacco   reports that she has never smoked. She has never used smokeless tobacco.    Alcohol     reports no history of alcohol use.    Family History         Problem Relation Age of Onset    Rheum Arthritis Mother     Dementia Mother     High Blood Pressure Father     Parkinsonism Father     High Blood Pressure Sister      Lines and Devices    IJ CVC    Mechanical Ventilation Data   VENT SETTINGS (Comprehensive)     Additional Respiratory Assessments  Pulse: 94  Respirations: 27  SpO2: 100 %    ABG  No results found for: \"PH\", \"PCO2\", \"PO2\", \"HCO3\", \"O2SAT\"  No results found for: \"IFIO2\", \"MODE\", \"SETTIDVOL\", \"SETPEEP\"    Vitals    height is 1.549 m (5' 1\") and weight is 68 kg (150 lb). Her oral temperature is 98.2 °F (36.8 °C). Her blood pressure is 144/72 (abnormal) and her pulse is 94. Her respiration is 27 and oxygen saturation is 100%.       Temperature Range: Temp: 98.2 °F (36.8 °C) Temp  Av.2 °F (36.8 °C)  Min: 98.2 °F (36.8 °C)  Max: 98.2 °F (36.8 °C)  BP Range:  Systolic (24hrs), Av , Min:144 , Max:154     Diastolic (24hrs), Av, Min:66, Max:72    Pulse Range: Pulse  Av.3  Min: 91  Max: 94  Respiration Range: Resp  Av.8  Min: 18  Max: 28  Current Pulse Ox::  SpO2: 100 %  24HR Pulse Ox Range:  SpO2  Av %  Min: 100 %  Max: 100 %  Oxygen Amount and Delivery:      I/O (24 Hours)    Patient Vitals for the past 8 hrs:   BP Temp Temp src Pulse Resp SpO2 Height

## 2024-03-11 NOTE — PROGRESS NOTES
4 Eyes Skin Assessment     NAME:  Sravanthi Lr  YOB: 1955  MEDICAL RECORD NUMBER:  [id number]    The patient is being assessed for  Admission    I agree that at least one RN has performed a thorough Head to Toe Skin Assessment on the patient. ALL assessment sites listed below have been assessed.      Areas assessed by both nurses:    Head, Face, Ears, Shoulders, Back, Chest, Arms, Elbows, Hands, Sacrum. Buttock, Coccyx, Ischium, and Legs. Feet and Heels        Does the Patient have a Wound? No noted wound(s)       Job Prevention initiated by RN: No  Wound Care Orders initiated by RN: No    Pressure Injury (Stage 3,4, Unstageable, DTI, NWPT, and Complex wounds) if present, place Wound referral order by RN under : No    New Ostomies, if present place, Ostomy referral order under : No     Nurse 1 eSignature: Electronically signed by Caro Mayer RN on 3/11/24 at 8:19 AM EDT    **SHARE this note so that the co-signing nurse can place an eSignature**    Nurse 2 eSignature: {Esignature:[id number]}

## 2024-03-11 NOTE — CARE COORDINATION
Obstructing ureteral stone. Pt currently in OR for cysto RP, left stent insertion. CM will f/u after OR.  SEVERINO MartinN, RN  [facility] Case Management  [phone]

## 2024-03-11 NOTE — CARE COORDINATION
Cysto RP w/left stent insertion today d/t obstructing stone. KRANTHI and DKA. On IV cefepime, IVFs and insulin gtt. Blood and UC pending. CM made in room visit to pt w/role of CM explained. Pt resides alone in a two story home, one step to enter and is independent w/the use of a cane. Pt is established w/Dr. Mcfarland and uses [facility] for her medications. No home O2, cpap or nebulizer. Hx of HHC w/[facility] and hx of SNF stay at [facility]. Pt denies any needs at this time, feels she will not need HHC. CM will continue to follow. Pt has transportation home.   SEVERINO MartinN, RN  [facility] Case Management  [phone]

## 2024-03-12 ENCOUNTER — APPOINTMENT (OUTPATIENT)
Dept: GENERAL RADIOLOGY | Age: 69
DRG: 853 | End: 2024-03-12
Payer: MEDICARE

## 2024-03-12 LAB
ALBUMIN SERPL-MCNC: 2.7 G/DL (ref 3.5–5.2)
ALP SERPL-CCNC: 94 U/L (ref 35–104)
ALT SERPL-CCNC: 15 U/L (ref 0–32)
ANION GAP SERPL CALCULATED.3IONS-SCNC: 14 MMOL/L (ref 7–16)
AST SERPL-CCNC: 17 U/L (ref 0–31)
BASOPHILS # BLD: 0 K/UL (ref 0–0.2)
BASOPHILS NFR BLD: 0 % (ref 0–2)
BILIRUB DIRECT SERPL-MCNC: 0.4 MG/DL (ref 0–0.3)
BILIRUB INDIRECT SERPL-MCNC: 0.2 MG/DL (ref 0–1)
BILIRUB SERPL-MCNC: 0.6 MG/DL (ref 0–1.2)
BUN SERPL-MCNC: 61 MG/DL (ref 6–23)
CALCIUM SERPL-MCNC: 7.2 MG/DL (ref 8.6–10.2)
CHLORIDE SERPL-SCNC: 106 MMOL/L (ref 98–107)
CO2 SERPL-SCNC: 14 MMOL/L (ref 22–29)
CREAT SERPL-MCNC: 2.3 MG/DL (ref 0.5–1)
EOSINOPHIL # BLD: 0 K/UL (ref 0.05–0.5)
EOSINOPHILS RELATIVE PERCENT: 0 % (ref 0–6)
ERYTHROCYTE [DISTWIDTH] IN BLOOD BY AUTOMATED COUNT: 13.6 % (ref 11.5–15)
GFR SERPL CREATININE-BSD FRML MDRD: 23 ML/MIN/1.73M2
GLUCOSE BLD-MCNC: 114 MG/DL (ref 74–99)
GLUCOSE BLD-MCNC: 132 MG/DL (ref 74–99)
GLUCOSE BLD-MCNC: 141 MG/DL (ref 74–99)
GLUCOSE BLD-MCNC: 147 MG/DL (ref 74–99)
GLUCOSE SERPL-MCNC: 142 MG/DL (ref 74–99)
HCT VFR BLD AUTO: 26.4 % (ref 34–48)
HGB BLD-MCNC: 8.8 G/DL (ref 11.5–15.5)
LACTATE BLDV-SCNC: 1 MMOL/L (ref 0.5–2.2)
LYMPHOCYTES NFR BLD: 0.25 K/UL (ref 1.5–4)
LYMPHOCYTES RELATIVE PERCENT: 2 % (ref 20–42)
MAGNESIUM SERPL-MCNC: 1.6 MG/DL (ref 1.6–2.6)
MAGNESIUM SERPL-MCNC: 2.2 MG/DL (ref 1.6–2.6)
MCH RBC QN AUTO: 29.4 PG (ref 26–35)
MCHC RBC AUTO-ENTMCNC: 33.3 G/DL (ref 32–34.5)
MCV RBC AUTO: 88.3 FL (ref 80–99.9)
MICROORGANISM SPEC CULT: ABNORMAL
MICROORGANISM SPEC CULT: NORMAL
MONOCYTES NFR BLD: 0.37 K/UL (ref 0.1–0.95)
MONOCYTES NFR BLD: 3 % (ref 2–12)
NEUTROPHILS NFR BLD: 96 % (ref 43–80)
NEUTS SEG NFR BLD: 13.58 K/UL (ref 1.8–7.3)
PHOSPHATE SERPL-MCNC: 1.9 MG/DL (ref 2.5–4.5)
PHOSPHATE SERPL-MCNC: 3.7 MG/DL (ref 2.5–4.5)
PLATELET # BLD AUTO: 60 K/UL (ref 130–450)
PLATELET CONFIRMATION: NORMAL
PMV BLD AUTO: 11.2 FL (ref 7–12)
POTASSIUM SERPL-SCNC: 4.5 MMOL/L (ref 3.5–5)
PROT SERPL-MCNC: 5.8 G/DL (ref 6.4–8.3)
RBC # BLD AUTO: 2.99 M/UL (ref 3.5–5.5)
RBC # BLD: ABNORMAL 10*6/UL
SODIUM SERPL-SCNC: 134 MMOL/L (ref 132–146)
SPECIMEN DESCRIPTION: ABNORMAL
SPECIMEN DESCRIPTION: NORMAL
WBC OTHER # BLD: 14.2 K/UL (ref 4.5–11.5)

## 2024-03-12 PROCEDURE — 6360000002 HC RX W HCPCS: Performed by: UROLOGY

## 2024-03-12 PROCEDURE — 6370000000 HC RX 637 (ALT 250 FOR IP): Performed by: INTERNAL MEDICINE

## 2024-03-12 PROCEDURE — 1200000000 HC SEMI PRIVATE

## 2024-03-12 PROCEDURE — 6360000002 HC RX W HCPCS: Performed by: SPECIALIST

## 2024-03-12 PROCEDURE — 2500000003 HC RX 250 WO HCPCS: Performed by: UROLOGY

## 2024-03-12 PROCEDURE — 84100 ASSAY OF PHOSPHORUS: CPT

## 2024-03-12 PROCEDURE — 2580000003 HC RX 258: Performed by: UROLOGY

## 2024-03-12 PROCEDURE — 85025 COMPLETE CBC W/AUTO DIFF WBC: CPT

## 2024-03-12 PROCEDURE — 83735 ASSAY OF MAGNESIUM: CPT

## 2024-03-12 PROCEDURE — 80053 COMPREHEN METABOLIC PANEL: CPT

## 2024-03-12 PROCEDURE — 83605 ASSAY OF LACTIC ACID: CPT

## 2024-03-12 PROCEDURE — 74018 RADEX ABDOMEN 1 VIEW: CPT

## 2024-03-12 PROCEDURE — 2580000003 HC RX 258: Performed by: SPECIALIST

## 2024-03-12 PROCEDURE — 82248 BILIRUBIN DIRECT: CPT

## 2024-03-12 PROCEDURE — 82962 GLUCOSE BLOOD TEST: CPT

## 2024-03-12 PROCEDURE — 6360000002 HC RX W HCPCS

## 2024-03-12 PROCEDURE — 2580000003 HC RX 258: Performed by: INTERNAL MEDICINE

## 2024-03-12 PROCEDURE — 99232 SBSQ HOSP IP/OBS MODERATE 35: CPT | Performed by: INTERNAL MEDICINE

## 2024-03-12 RX ORDER — PANTOPRAZOLE SODIUM 40 MG/1
40 TABLET, DELAYED RELEASE ORAL
Status: DISCONTINUED | OUTPATIENT
Start: 2024-03-12 | End: 2024-03-18 | Stop reason: HOSPADM

## 2024-03-12 RX ORDER — INSULIN LISPRO 100 [IU]/ML
0-6 INJECTION, SOLUTION INTRAVENOUS; SUBCUTANEOUS
Status: DISCONTINUED | OUTPATIENT
Start: 2024-03-13 | End: 2024-03-18 | Stop reason: HOSPADM

## 2024-03-12 RX ORDER — SODIUM BICARBONATE 650 MG/1
650 TABLET ORAL 3 TIMES DAILY
Status: COMPLETED | OUTPATIENT
Start: 2024-03-12 | End: 2024-03-14

## 2024-03-12 RX ORDER — MAGNESIUM SULFATE IN WATER 40 MG/ML
2000 INJECTION, SOLUTION INTRAVENOUS ONCE
Status: COMPLETED | OUTPATIENT
Start: 2024-03-12 | End: 2024-03-12

## 2024-03-12 RX ADMIN — HYDROMORPHONE HYDROCHLORIDE 0.5 MG: 1 INJECTION, SOLUTION INTRAMUSCULAR; INTRAVENOUS; SUBCUTANEOUS at 04:02

## 2024-03-12 RX ADMIN — CEFEPIME 2000 MG: 2 INJECTION, POWDER, FOR SOLUTION INTRAVENOUS at 13:30

## 2024-03-12 RX ADMIN — SODIUM CHLORIDE, POTASSIUM CHLORIDE, SODIUM LACTATE AND CALCIUM CHLORIDE: 600; 310; 30; 20 INJECTION, SOLUTION INTRAVENOUS at 07:47

## 2024-03-12 RX ADMIN — SODIUM CHLORIDE, PRESERVATIVE FREE 10 ML: 5 INJECTION INTRAVENOUS at 08:29

## 2024-03-12 RX ADMIN — SODIUM BICARBONATE 650 MG: 650 TABLET ORAL at 15:50

## 2024-03-12 RX ADMIN — HYDROMORPHONE HYDROCHLORIDE 0.5 MG: 1 INJECTION, SOLUTION INTRAMUSCULAR; INTRAVENOUS; SUBCUTANEOUS at 08:26

## 2024-03-12 RX ADMIN — HYDROMORPHONE HYDROCHLORIDE 0.5 MG: 1 INJECTION, SOLUTION INTRAMUSCULAR; INTRAVENOUS; SUBCUTANEOUS at 21:31

## 2024-03-12 RX ADMIN — HYDROMORPHONE HYDROCHLORIDE 0.5 MG: 1 INJECTION, SOLUTION INTRAMUSCULAR; INTRAVENOUS; SUBCUTANEOUS at 12:28

## 2024-03-12 RX ADMIN — Medication 15 MMOL: at 08:40

## 2024-03-12 RX ADMIN — SODIUM CHLORIDE, PRESERVATIVE FREE 10 ML: 5 INJECTION INTRAVENOUS at 04:02

## 2024-03-12 RX ADMIN — SODIUM BICARBONATE 650 MG: 650 TABLET ORAL at 20:07

## 2024-03-12 RX ADMIN — HYDROMORPHONE HYDROCHLORIDE 0.5 MG: 1 INJECTION, SOLUTION INTRAMUSCULAR; INTRAVENOUS; SUBCUTANEOUS at 16:55

## 2024-03-12 RX ADMIN — MAGNESIUM SULFATE HEPTAHYDRATE 2000 MG: 40 INJECTION, SOLUTION INTRAVENOUS at 08:33

## 2024-03-12 RX ADMIN — INSULIN GLARGINE 10 UNITS: 100 INJECTION, SOLUTION SUBCUTANEOUS at 20:10

## 2024-03-12 RX ADMIN — PANTOPRAZOLE SODIUM 40 MG: 40 TABLET, DELAYED RELEASE ORAL at 08:26

## 2024-03-12 RX ADMIN — SODIUM CHLORIDE, PRESERVATIVE FREE 10 ML: 5 INJECTION INTRAVENOUS at 20:07

## 2024-03-12 ASSESSMENT — PAIN DESCRIPTION - LOCATION
LOCATION: BACK
LOCATION: BACK
LOCATION: BACK;HIP
LOCATION: BACK
LOCATION: BACK
LOCATION: FLANK
LOCATION: BACK
LOCATION: FLANK

## 2024-03-12 ASSESSMENT — PAIN - FUNCTIONAL ASSESSMENT
PAIN_FUNCTIONAL_ASSESSMENT: PREVENTS OR INTERFERES SOME ACTIVE ACTIVITIES AND ADLS
PAIN_FUNCTIONAL_ASSESSMENT: PREVENTS OR INTERFERES WITH MANY ACTIVE NOT PASSIVE ACTIVITIES
PAIN_FUNCTIONAL_ASSESSMENT: ACTIVITIES ARE NOT PREVENTED
PAIN_FUNCTIONAL_ASSESSMENT: PREVENTS OR INTERFERES SOME ACTIVE ACTIVITIES AND ADLS
PAIN_FUNCTIONAL_ASSESSMENT: ACTIVITIES ARE NOT PREVENTED

## 2024-03-12 ASSESSMENT — PAIN DESCRIPTION - ORIENTATION
ORIENTATION: LEFT
ORIENTATION: LEFT;LOWER
ORIENTATION: LEFT
ORIENTATION: LEFT
ORIENTATION: LEFT;LOWER

## 2024-03-12 ASSESSMENT — PAIN DESCRIPTION - PAIN TYPE
TYPE: SURGICAL PAIN
TYPE: SURGICAL PAIN

## 2024-03-12 ASSESSMENT — PAIN DESCRIPTION - DESCRIPTORS
DESCRIPTORS: ACHING;SORE;STABBING
DESCRIPTORS: DULL;THROBBING
DESCRIPTORS: DULL;THROBBING
DESCRIPTORS: ACHING;SORE;SHARP
DESCRIPTORS: SORE
DESCRIPTORS: DULL;THROBBING

## 2024-03-12 ASSESSMENT — PAIN SCALES - GENERAL
PAINLEVEL_OUTOF10: 7
PAINLEVEL_OUTOF10: 0
PAINLEVEL_OUTOF10: 4
PAINLEVEL_OUTOF10: 6
PAINLEVEL_OUTOF10: 6
PAINLEVEL_OUTOF10: 4
PAINLEVEL_OUTOF10: 4
PAINLEVEL_OUTOF10: 8
PAINLEVEL_OUTOF10: 6
PAINLEVEL_OUTOF10: 7
PAINLEVEL_OUTOF10: 7
PAINLEVEL_OUTOF10: 8
PAINLEVEL_OUTOF10: 5

## 2024-03-12 NOTE — PATIENT CARE CONFERENCE
Intensive Care Daily Quality Rounding Checklist        ICU Team Members: Dr. Sher, residents, charge nurse, bedside nurse, clinical pharmacist and respiratory therapy     ICU Day #: 2     Intubation Date:  NA     Ventilator Day #: NA     Central Line Insertion Date: March 11                                                    Day #:  2                                                    Indication:       Arterial Line Insertion Date:  NA                             Day #:      Temporary Hemodialysis Catheter Insertion Date:                               Day #      DVT Prophylaxis: PCD    GI Prophylaxis: Protonix     Powell Catheter Insertion Date:  3/11/2024                                        Day #: 3                             Indications: Kidney stone, stent placed, powell per urology                             Continued need (if yes, reason documented and discussed with physician): yes,      Skin Issues/ Wounds and ordered treatment discussed on rounds: N     Goals/ Plans for the Day: Monitor labs and vitals. D/C central line . ORder Bicarb tabs. Transfer to      Reviewed plan and goals for day with patient and/or representative:  Yes

## 2024-03-12 NOTE — PROGRESS NOTES
[facility]  [facility]  Progress Note    SUBJECTIVE:  Chief Complaint   Patient presents with    Abdominal Pain    Emesis    Diarrhea     Recently changed diabetic medication    Hyperglycemia     Patient is tolerating medications. No reported adverse drug reactions.  No nausea, vomiting, diarrhea.    Review of systems:  As stated above in the chief complaint, otherwise negative.    Medications:  Scheduled Meds:   pantoprazole  40 mg Oral QAM AC    magnesium sulfate  2,000 mg IntraVENous Once    sodium chloride flush  5-40 mL IntraVENous 2 times per day    cefepime  2,000 mg IntraVENous Q24H    insulin glargine  10 Units SubCUTAneous Nightly    insulin lispro  0-12 Units SubCUTAneous TID WC    insulin lispro  0-4 Units SubCUTAneous Nightly     Continuous Infusions:   sodium chloride      lactated ringers IV soln 75 mL/hr at 24 0747     PRN Meds:dextrose bolus **OR** dextrose bolus, sodium chloride flush, sodium chloride, ondansetron **OR** ondansetron, polyethylene glycol, acetaminophen **OR** acetaminophen, HYDROmorphone, sodium phosphate 15 mmol in sodium chloride 0.9 % 250 mL IVPB, prochlorperazine    OBJECTIVE:  /77   Pulse 96   Temp 97.9 °F (36.6 °C) (Oral)   Resp 24   Ht 1.549 m (5' 1\")   Wt 73.5 kg (162 lb 0.6 oz)   SpO2 92%   BMI 30.62 kg/m²   Temp  Av °F (36.7 °C)  Min: 97.1 °F (36.2 °C)  Max: 98.3 °F (36.8 °C)  Constitutional: The patient is awake, alert, and oriented.   Skin: Warm and dry. No rashes were noted.   HEENT: Round and reactive pupils.  Moist mucous membranes.  No ulcerations or thrush.  Neck: Supple to movements.   Chest: No use of accessory muscles to breathe. Symmetrical expansion.  No wheezing, crackles or rhonchi.  Cardiovascular: S1 and S2 are rhythmic and regular. No murmurs appreciated.   Abdomen: Positive bowel sounds to auscultation. Benign to palpation. No masses felt. No hepatosplenomegaly.  Extremities: No clubbing, no  cyanosis, no edema.  Lines: Peripheral.    Laboratory and Tests:  Lab Results   Component Value Date    .0 (H) 03/11/2024    CRP 0.3 06/23/2023    CRP <0.3 05/25/2023     Lab Results   Component Value Date    SEDRATE 109 (H) 03/11/2024    SEDRATE 54 (H) 06/23/2023    SEDRATE 55 (H) 05/25/2023       Radiology:      Microbiology:   Respiratory panel: Negative  Blood cultures 3/11/2024: Negative so far  Urine culture 3/11/2024: >100 K E. coli  Nares screen MRSA: Negative    Recent Labs     03/11/24  0645   PROCAL >100.00*       ASSESSMENT:  Complicated UTI associated to obstructing ureteral stone.  Status post cystoscopy 3/11/2024  Hypotension secondary to sepsis-improved  Leukocytosis associated to complicated UTI-improving  Acute kidney injury- Improving    PLAN:  Continue Cefepime  Check final cultures  Transfer out of the ICU  We will follow with you    Manuelito Moreno MD  9:51 AM  3/12/2024

## 2024-03-12 NOTE — DISCHARGE INSTRUCTIONS
A ureteral stent was inserted during your recent procedure.  Unlike a heart \"stent\" which is metal, short, and permanent, this ureteral stent plastic, and temporary.  It spans from your kidney, down the ureter, and into your bladder.  This will need to be removed, so it is very important that you follow-up with your doctor.    IMPORTANT - This ureteral stent will likely cause you to experience frequent urination, urgency to urinate, back/flank pain with urination, and/or blood in the urine.  These things are very normal.  Taking the pain medications and/or anti-inflammatories will help to manage this discomfort if present.  If you have any questions or concerns you can contact [facility] Urology office at [phone].     Ureteral Stent Placement: What to Expect at Home  Your Recovery     A ureteral (say \"you-REE-ter-ul\") stent is a thin, hollow tube that is placed in the ureter to help urine pass from the kidney into the bladder. Ureters are the tubes that connect the kidneys to the bladder.  You may have a small amount of blood in your urine for 1 to 3 days after the procedure.  While the stent is in place, you may have to urinate more often, feel a sudden need to urinate, or feel like you can't completely empty your bladder. You may feel some pain when you urinate or do strenuous activity. You also may notice a small amount of blood in your urine after strenuous activities. These side effects usually don't prevent people from doing their normal daily activities.  You may have a thin string coming out of your urethra. Your urethra is the tube that carries urine from your bladder to outside your body. This string is attached to the stent. Try not to pull on the string. It will be used to pull out the stent when you no longer need it.  After the procedure, urine may flow better from your kidneys to your bladder. A ureteral stent may be left in place for several days or for as long as several months. Your doctor will

## 2024-03-12 NOTE — PROGRESS NOTES
3/12/2024 1:52 PM  Sravanthi Lr  [id number]    Subjective:    Patient remains in the ICU but is to be transferred to the floor today  She is awake and alert  She continues to have left flank pain  Her Long catheter is draining yellow urine  No family present    Review of Systems  Constitutional: No fever or chills, weak, tired  Respiratory: negative for cough and hemoptysis  Cardiovascular: negative for chest pain and dyspnea  Gastrointestinal: negative for abdominal pain, diarrhea, nausea and vomiting   : See above  Derm: negative for rash and skin lesion(s)  Neurological: negative for seizures and tremors  Musculoskeletal: Negative    Psychiatric: Negative   All other reviews are negative      Scheduled Meds:   pantoprazole  40 mg Oral QAM AC    sodium bicarbonate  650 mg Oral TID    sodium chloride flush  5-40 mL IntraVENous 2 times per day    cefepime  2,000 mg IntraVENous Q24H    insulin glargine  10 Units SubCUTAneous Nightly    insulin lispro  0-12 Units SubCUTAneous TID WC    insulin lispro  0-4 Units SubCUTAneous Nightly       Objective:  Vitals:    03/12/24 1200   BP: (!) 148/77   Pulse: 87   Resp: 28   Temp: 97.8 °F (36.6 °C)   SpO2: 93%         Allergies: Patient has no known allergies.    General Appearance: alert and oriented to person, place and time and in no acute distress  Skin: no rash or erythema  Head: normocephalic and atraumatic  Pulmonary/Chest: normal air movement, no respiratory distress  Abdomen: soft, non-tender, non-distended  Genitourinary: Long catheter with yellow urine  Extremities: no cyanosis, clubbing or edema         Labs:     Recent Labs     03/12/24  0409      K 4.5      CO2 14*   BUN 61*   CREATININE 2.3*   GLUCOSE 142*   CALCIUM 7.2*       Lab Results   Component Value Date/Time    HGB 8.8 03/12/2024 04:09 AM    HCT 26.4 03/12/2024 04:09 AM       No results found for: \"PSA\"  EXAMINATION:  ONE SUPINE XRAY VIEW(S) OF THE ABDOMEN     3/12/2024 11:43 am

## 2024-03-12 NOTE — PROGRESS NOTES
Off insulin drip. Blood sugar improved. Stone management per uro. Fluids on going renal function improved. Id on for sepsis. Wbc down bc negative. Complicated uti tolerating antibiotics. Continue with current care

## 2024-03-12 NOTE — PLAN OF CARE
Problem: Discharge Planning  Goal: Discharge to home or other facility with appropriate resources  Outcome: Progressing  Flowsheets (Taken 3/11/2024 2000)  Discharge to home or other facility with appropriate resources: Arrange for needed discharge resources and transportation as appropriate     Problem: Pain  Goal: Verbalizes/displays adequate comfort level or baseline comfort level  Outcome: Progressing     Problem: ABCDS Injury Assessment  Goal: Absence of physical injury  Outcome: Progressing     Problem: Safety - Adult  Goal: Free from fall injury  Outcome: Progressing     Problem: Chronic Conditions and Co-morbidities  Goal: Patient's chronic conditions and co-morbidity symptoms are monitored and maintained or improved  Outcome: Progressing  Flowsheets (Taken 3/11/2024 2000)  Care Plan - Patient's Chronic Conditions and Co-Morbidity Symptoms are Monitored and Maintained or Improved: Monitor and assess patient's chronic conditions and comorbid symptoms for stability, deterioration, or improvement

## 2024-03-12 NOTE — PROGRESS NOTES
..  Intensive Care Daily Quality Rounding Checklist      ICU Team Members: Dr. Sher, residents, charge nurse, bedside nurse, clinical pharmacist and respiratory therapy    ICU Day #: 1    Intubation Date:  NA    Ventilator Day #: NA    Central Line Insertion Date: March 11        Day #: NUMBER: 1        Indication:      Arterial Line Insertion Date:  NA      Day #:     Temporary Hemodialysis Catheter Insertion Date:        Day #     DVT Prophylaxis:     GI Prophylaxis: Protonix    Long Catheter Insertion Date:  3/11/2024       Day #: 2      Indications: Kidney stone, stent placed      Continued need (if yes, reason documented and discussed with physician): yes,     Skin Issues/ Wounds and ordered treatment discussed on rounds: N    Goals/ Plans for the Day: Monitor labs and vitals. Patient to OR for stent placement. On insulin drip will bridge whn gap closed and patient is eating    Reviewed plan and goals for day with patient and/or representative:  Yes

## 2024-03-12 NOTE — PROGRESS NOTES
Critical Care Team - Daily Progress Note      Date and time: 3/12/2024 12:13 PM  Patient's name:  Sravanthi Lr  Medical Record Number: [id number]  Patient's account/billing number: [id number]  Patient's YOB: 1955  Age: 68 y.o.  Date of Admission: 3/10/2024  6:51 PM  Length of stay during current admission: 1    Primary Care Physician: Pascual Mcfarland DO  ICU Attending Physician: Dr. Sher    Code Status: Full Code    Reason for ICU admission: DKA and Obstructive Uropathy    SUBJECTIVE:     OVERNIGHT EVENTS: Patient seen and examined at bedside.  There were no acute events overnight.  Endocrinology is on board and bridged patient to subcutaneous insulin yesterday.  Patient no longer on insulin drip.  Patient tolerating small bites of breakfast this a.m. of clear liquids.  Patient states she is still nauseous and is complaining of slight to moderate left flank and back pain.  Patient does have urine output at this time.  Patient had no other concerns or complaints at this time.    Intake/Output:   I/O this shift:  In: -   Out: 565 [Urine:565]  I/O last 3 completed shifts:  In: 300 [I.V.:300]  Out: 1130 [Urine:1130]    Awake and following commands: Yes  Current Ventilation: - No ventilator support  Vasopressors: None    ROS:  Unless stated above, ROS is otherwise negative.    Initial HPI + past overnight events:     Ms. Lr presented to the ED today for evaluation of left abdominal and flank pain, emesis/diarrhea approximately 8 episodes of emesis and 2 episodes of diarrhea, and hyperglycemia that started on Friday. Medical history is significant for type 2 diabetes with recent alteration in her diabetic regimen a few weeks ago and started on mounjaro receiving 2 doses previously on jenuvia; gout on uloric, cholchicine - does not tolerate allopurinol which caused hair loss, hypertension, stage 3 ckd, and djd/osteoarthritis. She reports her blood sugar typically runs in the 110s on home checks

## 2024-03-12 NOTE — PROGRESS NOTES
Patient transferring from ICU room 213 to 726, report called to [address] [place] [address] RN, patient belongings consisting of purse, slippers, jacket, cell phone, phone , shirt, pants, and underwear transported with patient.

## 2024-03-12 NOTE — PROGRESS NOTES
Diabetes Education consult noted.  Chart Reviewed.  A1C- 9.1% 3/11/24.  Attempted to complete consult.  Pt reports she is in too much pain for education today.  Will continue to follow chart.   Electronically signed by Irene Lee RN on 3/12/2024 at 10:06 AM

## 2024-03-12 NOTE — PROGRESS NOTES
ENDOCRINOLOGY PROGRESS NOTE      Date of admission: 3/10/2024  Date of service: 3/12/2024  Admitting physician: Pascual Mcfarland DO   Primary Care Physician: Pascual Mcfarland DO  Consultant physician: Bo Vazquez MD     Reason for the consultation:  Uncontrolled DM    History of Present Illness:  The history is provided by the patient. Accuracy of the patient data is excellent    Sravanthi Lr is a very pleasant 68 y.o. old female with PMH of T2DM, HTN and other listed below admitted to [facility] on 3/10/2024 because of N/V and flank pain and found to be in DKA and has an obstructive kidney stone, endocrine service was consulted for diabetes management.      Subjective  I saw and examined the patient at bedside this afternoon.  Transferred out of the ICU.  Glucose level improving but still above goal.    Inpatient diet:   Carb Restricted diet     Point of care glucose monitoring   (Independently reviewed)   Recent Labs     03/11/24  1411 03/11/24  1505 03/11/24  1611 03/11/24  1734 03/11/24  2224 03/12/24  0743 03/12/24  1225 03/12/24  1551   POCGLU 149* 165* 170* 158* 163* 141* 147* 132*   \  Scheduled Meds:   pantoprazole  40 mg Oral QAM AC    sodium bicarbonate  650 mg Oral TID    sodium chloride flush  5-40 mL IntraVENous 2 times per day    cefepime  2,000 mg IntraVENous Q24H    insulin glargine  10 Units SubCUTAneous Nightly    insulin lispro  0-12 Units SubCUTAneous TID WC    insulin lispro  0-4 Units SubCUTAneous Nightly       PRN Meds:   dextrose bolus, 125 mL, PRN   Or  dextrose bolus, 250 mL, PRN  sodium chloride flush, 5-40 mL, PRN  sodium chloride, , PRN  ondansetron, 4 mg, Q8H PRN   Or  ondansetron, 4 mg, Q6H PRN  polyethylene glycol, 17 g, Daily PRN  acetaminophen, 650 mg, Q6H PRN   Or  acetaminophen, 650 mg, Q6H PRN  HYDROmorphone, 0.5 mg, Q4H PRN      Continuous Infusions:   sodium chloride         Review of Systems  All systems reviewed. All negative except for symptoms mentioned in HPI     OBJECTIVE   noncompliance  Discussed with patient the importance of eating consistent carbohydrate meals, avoiding high glycemic index food. Also, discussed with patient the risk and negative consequences of dietary noncompliance on blood glucose control, blood pressure and weight      Interdisciplinary plan for communication with healthcare providers:   Consult recommendations were discussed with the Primary Service/Nursing staff      The above issues were reviewed with the patient who understood and agreed with the plan.    Thank you for allowing us to participate in the care of this patient. Please do not hesitate to contact us with any additional questions.     Bo Vazquez MD  Endocrinologist, [facility]   [address] [place] [id number]   Phone: [phone]  Fax: [phone]  --------------------------------------------  An electronic signature was used to authenticate this note. Bo Vazquez MD on 3/12/2024 at 5:45 PM

## 2024-03-12 NOTE — PLAN OF CARE
Problem: Discharge Planning  Goal: Discharge to home or other facility with appropriate resources  3/12/2024 1320 by Muriel Bal RN  Outcome: Progressing  3/12/2024 0029 by Elizabeth Lipscomb RN  Outcome: Progressing  Flowsheets (Taken 3/11/2024 2000)  Discharge to home or other facility with appropriate resources: Arrange for needed discharge resources and transportation as appropriate     Problem: Pain  Goal: Verbalizes/displays adequate comfort level or baseline comfort level  3/12/2024 1320 by Muriel Bal RN  Outcome: Progressing  3/12/2024 0029 by Elizabeth Lipscomb RN  Outcome: Progressing     Problem: ABCDS Injury Assessment  Goal: Absence of physical injury  3/12/2024 1320 by Muriel Bal RN  Outcome: Progressing  Flowsheets  Taken 3/12/2024 0100 by Elizabeth Lipscomb RN  Absence of Physical Injury: Implement safety measures based on patient assessment  Taken 3/12/2024 0034 by Elizabeth Lipscomb RN  Absence of Physical Injury: Implement safety measures based on patient assessment  3/12/2024 0029 by Elizabeth Lipscomb RN  Outcome: Progressing     Problem: Chronic Conditions and Co-morbidities  Goal: Patient's chronic conditions and co-morbidity symptoms are monitored and maintained or improved  3/12/2024 1320 by Muriel Bal RN  Outcome: Progressing  3/12/2024 0029 by Elizabeth Lipscomb RN  Outcome: Progressing  Flowsheets (Taken 3/11/2024 2000)  Care Plan - Patient's Chronic Conditions and Co-Morbidity Symptoms are Monitored and Maintained or Improved: Monitor and assess patient's chronic conditions and comorbid symptoms for stability, deterioration, or improvement

## 2024-03-13 LAB
ALBUMIN SERPL-MCNC: 2.8 G/DL (ref 3.5–5.2)
ALP SERPL-CCNC: 102 U/L (ref 35–104)
ALT SERPL-CCNC: 12 U/L (ref 0–32)
ANION GAP SERPL CALCULATED.3IONS-SCNC: 15 MMOL/L (ref 7–16)
AST SERPL-CCNC: 15 U/L (ref 0–31)
BASOPHILS # BLD: 0 K/UL (ref 0–0.2)
BASOPHILS NFR BLD: 0 % (ref 0–2)
BILIRUB DIRECT SERPL-MCNC: 0.4 MG/DL (ref 0–0.3)
BILIRUB INDIRECT SERPL-MCNC: 0.3 MG/DL (ref 0–1)
BILIRUB SERPL-MCNC: 0.7 MG/DL (ref 0–1.2)
BUN SERPL-MCNC: 45 MG/DL (ref 6–23)
CALCIUM SERPL-MCNC: 7.7 MG/DL (ref 8.6–10.2)
CHLORIDE SERPL-SCNC: 104 MMOL/L (ref 98–107)
CO2 SERPL-SCNC: 15 MMOL/L (ref 22–29)
CREAT SERPL-MCNC: 1.7 MG/DL (ref 0.5–1)
EOSINOPHIL # BLD: 0.1 K/UL (ref 0.05–0.5)
EOSINOPHILS RELATIVE PERCENT: 1 % (ref 0–6)
ERYTHROCYTE [DISTWIDTH] IN BLOOD BY AUTOMATED COUNT: 13.8 % (ref 11.5–15)
GFR SERPL CREATININE-BSD FRML MDRD: 32 ML/MIN/1.73M2
GLUCOSE BLD-MCNC: 132 MG/DL (ref 74–99)
GLUCOSE BLD-MCNC: 144 MG/DL (ref 74–99)
GLUCOSE BLD-MCNC: 148 MG/DL (ref 74–99)
GLUCOSE SERPL-MCNC: 143 MG/DL (ref 74–99)
HCT VFR BLD AUTO: 28 % (ref 34–48)
HGB BLD-MCNC: 9 G/DL (ref 11.5–15.5)
LACTATE BLDV-SCNC: 0.8 MMOL/L (ref 0.5–2.2)
LYMPHOCYTES NFR BLD: 0.4 K/UL (ref 1.5–4)
LYMPHOCYTES RELATIVE PERCENT: 4 % (ref 20–42)
MAGNESIUM SERPL-MCNC: 1.9 MG/DL (ref 1.6–2.6)
MCH RBC QN AUTO: 29.3 PG (ref 26–35)
MCHC RBC AUTO-ENTMCNC: 32.1 G/DL (ref 32–34.5)
MCV RBC AUTO: 91.2 FL (ref 80–99.9)
MICROORGANISM SPEC CULT: ABNORMAL
MONOCYTES NFR BLD: 0.9 K/UL (ref 0.1–0.95)
MONOCYTES NFR BLD: 8 % (ref 2–12)
NEUTROPHILS NFR BLD: 88 % (ref 43–80)
NEUTS SEG NFR BLD: 10 K/UL (ref 1.8–7.3)
PHOSPHATE SERPL-MCNC: 2.7 MG/DL (ref 2.5–4.5)
PLATELET CONFIRMATION: NORMAL
PLATELET, FLUORESCENCE: 61 K/UL (ref 130–450)
PMV BLD AUTO: 11.4 FL (ref 7–12)
POTASSIUM SERPL-SCNC: 3.8 MMOL/L (ref 3.5–5)
PROT SERPL-MCNC: 5.9 G/DL (ref 6.4–8.3)
RBC # BLD AUTO: 3.07 M/UL (ref 3.5–5.5)
RBC # BLD: ABNORMAL 10*6/UL
SODIUM SERPL-SCNC: 134 MMOL/L (ref 132–146)
SPECIMEN DESCRIPTION: ABNORMAL
WBC OTHER # BLD: 11.4 K/UL (ref 4.5–11.5)

## 2024-03-13 PROCEDURE — 6370000000 HC RX 637 (ALT 250 FOR IP): Performed by: INTERNAL MEDICINE

## 2024-03-13 PROCEDURE — 6360000002 HC RX W HCPCS: Performed by: UROLOGY

## 2024-03-13 PROCEDURE — 99232 SBSQ HOSP IP/OBS MODERATE 35: CPT | Performed by: INTERNAL MEDICINE

## 2024-03-13 PROCEDURE — 84100 ASSAY OF PHOSPHORUS: CPT

## 2024-03-13 PROCEDURE — 83605 ASSAY OF LACTIC ACID: CPT

## 2024-03-13 PROCEDURE — 2580000003 HC RX 258: Performed by: UROLOGY

## 2024-03-13 PROCEDURE — 82248 BILIRUBIN DIRECT: CPT

## 2024-03-13 PROCEDURE — 83735 ASSAY OF MAGNESIUM: CPT

## 2024-03-13 PROCEDURE — 1200000000 HC SEMI PRIVATE

## 2024-03-13 PROCEDURE — 80053 COMPREHEN METABOLIC PANEL: CPT

## 2024-03-13 PROCEDURE — 82962 GLUCOSE BLOOD TEST: CPT

## 2024-03-13 PROCEDURE — 36415 COLL VENOUS BLD VENIPUNCTURE: CPT

## 2024-03-13 PROCEDURE — 6370000000 HC RX 637 (ALT 250 FOR IP): Performed by: REGISTERED NURSE

## 2024-03-13 PROCEDURE — 85025 COMPLETE CBC W/AUTO DIFF WBC: CPT

## 2024-03-13 RX ORDER — DEXTROSE MONOHYDRATE 100 MG/ML
INJECTION, SOLUTION INTRAVENOUS CONTINUOUS PRN
Status: DISCONTINUED | OUTPATIENT
Start: 2024-03-13 | End: 2024-03-18 | Stop reason: HOSPADM

## 2024-03-13 RX ORDER — CEFDINIR 300 MG/1
300 CAPSULE ORAL EVERY 12 HOURS SCHEDULED
Status: DISCONTINUED | OUTPATIENT
Start: 2024-03-13 | End: 2024-03-18 | Stop reason: HOSPADM

## 2024-03-13 RX ORDER — GLUCAGON 1 MG/ML
1 KIT INJECTION PRN
Status: DISCONTINUED | OUTPATIENT
Start: 2024-03-13 | End: 2024-03-18 | Stop reason: HOSPADM

## 2024-03-13 RX ADMIN — CEFDINIR 300 MG: 300 CAPSULE ORAL at 21:50

## 2024-03-13 RX ADMIN — SODIUM CHLORIDE, PRESERVATIVE FREE 10 ML: 5 INJECTION INTRAVENOUS at 08:05

## 2024-03-13 RX ADMIN — PANTOPRAZOLE SODIUM 40 MG: 40 TABLET, DELAYED RELEASE ORAL at 05:13

## 2024-03-13 RX ADMIN — CEFDINIR 300 MG: 300 CAPSULE ORAL at 11:32

## 2024-03-13 RX ADMIN — SODIUM BICARBONATE 650 MG: 650 TABLET ORAL at 14:29

## 2024-03-13 RX ADMIN — SODIUM CHLORIDE, PRESERVATIVE FREE 10 ML: 5 INJECTION INTRAVENOUS at 21:52

## 2024-03-13 RX ADMIN — SODIUM BICARBONATE 650 MG: 650 TABLET ORAL at 21:49

## 2024-03-13 RX ADMIN — HYDROMORPHONE HYDROCHLORIDE 0.5 MG: 1 INJECTION, SOLUTION INTRAMUSCULAR; INTRAVENOUS; SUBCUTANEOUS at 03:46

## 2024-03-13 RX ADMIN — HYDROMORPHONE HYDROCHLORIDE 0.5 MG: 1 INJECTION, SOLUTION INTRAMUSCULAR; INTRAVENOUS; SUBCUTANEOUS at 22:32

## 2024-03-13 RX ADMIN — SODIUM BICARBONATE 650 MG: 650 TABLET ORAL at 08:05

## 2024-03-13 RX ADMIN — INSULIN GLARGINE 10 UNITS: 100 INJECTION, SOLUTION SUBCUTANEOUS at 21:50

## 2024-03-13 RX ADMIN — HYDROMORPHONE HYDROCHLORIDE 0.5 MG: 1 INJECTION, SOLUTION INTRAMUSCULAR; INTRAVENOUS; SUBCUTANEOUS at 18:05

## 2024-03-13 ASSESSMENT — PAIN DESCRIPTION - DESCRIPTORS
DESCRIPTORS: DISCOMFORT;SORE;TENDER
DESCRIPTORS: ACHING

## 2024-03-13 ASSESSMENT — PAIN SCALES - GENERAL
PAINLEVEL_OUTOF10: 7
PAINLEVEL_OUTOF10: 4
PAINLEVEL_OUTOF10: 0
PAINLEVEL_OUTOF10: 5

## 2024-03-13 ASSESSMENT — PAIN - FUNCTIONAL ASSESSMENT
PAIN_FUNCTIONAL_ASSESSMENT: PREVENTS OR INTERFERES SOME ACTIVE ACTIVITIES AND ADLS
PAIN_FUNCTIONAL_ASSESSMENT: ACTIVITIES ARE NOT PREVENTED

## 2024-03-13 ASSESSMENT — PAIN DESCRIPTION - LOCATION: LOCATION: BACK

## 2024-03-13 ASSESSMENT — PAIN DESCRIPTION - ORIENTATION: ORIENTATION: LEFT

## 2024-03-13 NOTE — PLAN OF CARE
Problem: Discharge Planning  Goal: Discharge to home or other facility with appropriate resources  Outcome: Progressing     Problem: Pain  Goal: Verbalizes/displays adequate comfort level or baseline comfort level  Outcome: Progressing     Problem: ABCDS Injury Assessment  Goal: Absence of physical injury  Outcome: Progressing     Problem: Safety - Adult  Goal: Free from fall injury  Outcome: Progressing     Problem: Chronic Conditions and Co-morbidities  Goal: Patient's chronic conditions and co-morbidity symptoms are monitored and maintained or improved  Outcome: Progressing

## 2024-03-13 NOTE — PROGRESS NOTES
Patient seen still with flank pain.urine clear . Tolerating antibiotics. Renal function continues to improve. Blood sugars stable

## 2024-03-13 NOTE — PROGRESS NOTES
Comprehensive Nutrition Assessment    Type and Reason for Visit:  Initial, Positive Nutrition Screen    Nutrition Recommendations/Plan:   Recommend ADAT & will monitor for nutrition progression  Will add Gelatein daily, monitor tolerance     Malnutrition Assessment:  Malnutrition Status:  At risk for malnutrition (Comment) (03/13/24 1530)    Context:  Acute Illness     Findings of the 6 clinical characteristics of malnutrition:  Energy Intake:  Mild decrease in energy intake (Comment)  Weight Loss:  No significant weight loss     Body Fat Loss:  Unable to assess (pt sleeping at time of room visit)     Muscle Mass Loss:  Unable to assess (pt sleeping at time of room visit)    Fluid Accumulation:  No significant fluid accumulation     Strength:  Not Performed    Nutrition Assessment:    Pt w/ complicated UTI associated to obstructing stone s/p cysto 3/11. Note KRANTHI (imroving), DKA. Hx CKD, DM. Recommend ADAT & will add ONS.    Nutrition Related Findings:    A&O X4, -3L, generalized trace edema, abd soft/rounded, +BS, loss of appetite, BUN/Cr 45/1.7, A1C 9.1, Glu 143, N/V/D PTA   Wound Type: None       Current Nutrition Intake & Therapies:    Average Meal Intake: Unable to assess (not avail per doc flow/intake clipboard, pt sleeping)  Average Supplements Intake: None Ordered  ADULT DIET; Clear Liquid    Anthropometric Measures:  Height: 154.9 cm (5' 1\")  Ideal Body Weight (IBW): 105 lbs (48 kg)    Admission Body Weight: 73.5 kg (162 lb 0.6 oz) (3/11 bed)  Current Body Weight: 73.5 kg (162 lb 0.6 oz) (3/11), 154.3 % IBW. Weight Source: Bed Scale  Current BMI (kg/m2): 30.6  Usual Body Weight: 68.9 kg (152 lb) (5/25/23 actual per EMR)  % Weight Change (Calculated): 6.6                    BMI Categories: Obese Class 1 (BMI 30.0-34.9)    Estimated Daily Nutrient Needs:  Energy Requirements Based On: Formula  Weight Used for Energy Requirements: Current  Energy (kcal/day):   Weight Used for Protein Requirements:  Ideal  Protein (g/day): 60-70 (1.2-1.4 as tolerated w/ KRANTHI)  Method Used for Fluid Requirements: 1 ml/kcal  Fluid (ml/day):     Nutrition Diagnosis:   Inadequate oral intake related to altered GI function as evidenced by NPO or clear liquid status due to medical condition, GI abnormality, poor intake prior to admission    Nutrition Interventions:   Food and/or Nutrient Delivery: Continue Current Diet, Start Oral Nutrition Supplement  Nutrition Education/Counseling: No recommendation at this time (pt seen by DM educator 3/13, pt sleeping at time of room visit)  Coordination of Nutrition Care: Continue to monitor while inpatient       Goals:     Goals: other (specify), by next RD assessment  Specify Other Goals: nutrition progression    Nutrition Monitoring and Evaluation:      Food/Nutrient Intake Outcomes: Diet Advancement/Tolerance, Food and Nutrient Intake, Supplement Intake  Physical Signs/Symptoms Outcomes: Biochemical Data, GI Status, Fluid Status or Edema, Nutrition Focused Physical Findings, Weight, Skin    Discharge Planning:    Too soon to determine     Miranda D'Amico, RD, LD  Contact: [phone]

## 2024-03-13 NOTE — PROGRESS NOTES
P Quality Flow/Interdisciplinary Rounds Progress Note        Quality Flow Rounds held on March 13, 2024    Disciplines Attending:  Bedside Nurse, , , and Nursing Unit Leadership    Sravanthi Lr was admitted on 3/10/2024  6:51 PM    Anticipated Discharge Date:  Expected Discharge Date: 03/13/24    Disposition:    Job Score:  Job Scale Score: 20    Readmission Risk              Risk of Unplanned Readmission:  12           Discussed patient goal for the day, patient clinical progression, and barriers to discharge.  The following Goal(s) of the Day/Commitment(s) have been identified:   Discharge Planning      Amy Pacheco RN  March 13, 2024

## 2024-03-13 NOTE — PROGRESS NOTES
[facility]  [facility]  Progress Note    SUBJECTIVE:  Chief Complaint   Patient presents with    Abdominal Pain    Emesis    Diarrhea     Recently changed diabetic medication    Hyperglycemia     Patient is tolerating medications. No reported adverse drug reactions.  Transferred out of the ICU   Endorses left flank pain   No nausea   No fevers     Review of systems:  As stated above in the chief complaint, otherwise negative.    Medications:  Scheduled Meds:   pantoprazole  40 mg Oral QAM AC    sodium bicarbonate  650 mg Oral TID    insulin lispro  0-6 Units SubCUTAneous TID WC    sodium chloride flush  5-40 mL IntraVENous 2 times per day    cefepime  2,000 mg IntraVENous Q24H    insulin glargine  10 Units SubCUTAneous Nightly    insulin lispro  0-4 Units SubCUTAneous Nightly     Continuous Infusions:   dextrose      sodium chloride       PRN Meds:glucose, dextrose bolus **OR** dextrose bolus, glucagon (rDNA), dextrose, sodium chloride flush, sodium chloride, ondansetron **OR** ondansetron, polyethylene glycol, acetaminophen **OR** acetaminophen, HYDROmorphone    OBJECTIVE:  /68   Pulse 92   Temp 97.5 °F (36.4 °C) (Oral)   Resp 16   Ht 1.549 m (5' 1\")   Wt 73.5 kg (162 lb 0.6 oz)   SpO2 93%   BMI 30.62 kg/m²   Temp  Av °F (36.7 °C)  Min: 97.5 °F (36.4 °C)  Max: 98.6 °F (37 °C)  Constitutional: The patient is awake, alert, and oriented. Lying in bed, in no distress  Skin: Warm and dry. No rashes were noted.   HEENT: Round and reactive pupils.  Moist mucous membranes.  No ulcerations or thrush.  Neck: Supple to movements.   Chest: No use of accessory muscles to breathe. Symmetrical expansion.  No wheezing, crackles or rhonchi.  Cardiovascular: S1 and S2 are rhythmic and regular. No murmurs appreciated.   Abdomen: Positive bowel sounds to auscultation. Benign to palpation. No masses felt. Soft. +left flank pain   Extremities: No edema.  Lines: Peripheral.  Long

## 2024-03-13 NOTE — PROGRESS NOTES
Reason for consult:  DKA / UTI    A1C:   9.1% (3/11/24)          Patient states the following concerns/barriers to diabetes self-management:     [x] None       [] Medication cost   [] Food cost/availability        [] Reading  [] Hearing   [] Vision                [] Work    [] Transportation  [] No insurance  [] Physical limitations    [] Other:      Patient states the following about their diabetes/health:   Taking Mounjaro, Amaryl, Metformin , monitor daily- has needed supplies     [x]   Drinks 2 bottles of water, one can diet soda/day   [x]   Usually 2 meals/day - breakfast and dinner   [x]   Patient states not interested in starting insulin     Diabetes survival packet provided to:   [x] Patient     [] Other:    Information given:   Definition of diabetes   Target glucose ranges/A1C   Self-monitoring of blood glucose   Prevention/symptoms/treatment of hypo-/hyperglycemia   Medication adherence             The plate method/meal planning guidelines             The benefits of exercise and recommendations             Reducing the risk of chronic complications     Diabetes medications reviewed (use, purpose, action): Lantus, Metformin, Mounjaro, Amaryl           Post-education Assessment  [x]  Attentive to teaching  [x]  Answered questions appropriately when asked   [x]  Seems able to apply concepts to daily lifestyle  [x]  Seems motivated to do well  [x]  Verbalized an understanding of plate method  [x]  Verbalized an understanding of prescribed antidiabetes medications   [x]  Verbalized an understanding of target glucose ranges/A1C level  [x]  Expresses an intent to comply with treatment plan   []  Showed very little interest in complying with treatment plan   []  Seems to have trouble applying concepts to daily lifestyle     COMMENTS: Patient states that she is not interested in taking insulin at home at this time. Notified patient's RN.           Recommendations:   [x] Carbohydrate-controlled diet    [] Script

## 2024-03-13 NOTE — PROGRESS NOTES
ENDOCRINOLOGY PROGRESS NOTE      Date of admission: 3/10/2024  Date of service: 3/13/2024  Admitting physician: Pascual Mcfarland DO   Primary Care Physician: Pascual Mcfarland DO  Consultant physician: Bo Vazquez MD     Reason for the consultation:  Uncontrolled DM    History of Present Illness:  The history is provided by the patient. Accuracy of the patient data is excellent    Sravanthi Lr is a very pleasant 68 y.o. old female with PMH of T2DM, HTN and other listed below admitted to [facility] on 3/10/2024 because of N/V and flank pain and found to be in DKA and has an obstructive kidney stone, endocrine service was consulted for diabetes management.      Subjective  I saw and examined the patient at bedside this afternoon.Glucose level improving     Inpatient diet:   Carb Restricted diet     Point of care glucose monitoring   (Independently reviewed)   Recent Labs     03/11/24  1734 03/11/24  2224 03/12/24  0743 03/12/24  1225 03/12/24  1551 03/12/24  2004 03/13/24  0513 03/13/24  1103   POCGLU 158* 163* 141* 147* 132* 114* 132* 144*   \  Scheduled Meds:   cefdinir  300 mg Oral 2 times per day    pantoprazole  40 mg Oral QAM AC    sodium bicarbonate  650 mg Oral TID    insulin lispro  0-6 Units SubCUTAneous TID WC    sodium chloride flush  5-40 mL IntraVENous 2 times per day    insulin glargine  10 Units SubCUTAneous Nightly    insulin lispro  0-4 Units SubCUTAneous Nightly       PRN Meds:   glucose, 4 tablet, PRN  dextrose bolus, 125 mL, PRN   Or  dextrose bolus, 250 mL, PRN  glucagon (rDNA), 1 mg, PRN  dextrose, , Continuous PRN  sodium chloride flush, 5-40 mL, PRN  sodium chloride, , PRN  ondansetron, 4 mg, Q8H PRN   Or  ondansetron, 4 mg, Q6H PRN  polyethylene glycol, 17 g, Daily PRN  acetaminophen, 650 mg, Q6H PRN   Or  acetaminophen, 650 mg, Q6H PRN  HYDROmorphone, 0.5 mg, Q4H PRN      Continuous Infusions:   dextrose      sodium chloride         Review of Systems  All systems reviewed. All negative except for  03/11/2024 05:40 AM    LABA1C 4.7 10/24/2022 12:00 PM     No results found for: \"TSH\", \"T4FREE\", \"T5YOMGO\", \"FT3\", \"L4QGAHV\", \"TSI\", \"TPOABS\", \"THGAB\"  Lab Results   Component Value Date/Time    LABA1C 9.1 03/11/2024 06:45 AM    GLUCOSE 143 03/13/2024 05:03 AM     No results found for: \"TRIG\", \"HDL\", \"LDLCALC\", \"CHOL\"    Blood culture   No results found for: \"BC\"    Radiology:  XR ABDOMEN (KUB) (SINGLE AP VIEW)   Final Result   1. Mild ileus pattern in the small bowel.   2. Left double-J ureteral catheter appears to be in appropriate position.         FL RETROGRADE PYELOGRAM W WO KUB   Final Result   Intraprocedural fluoroscopic spot images as above.  See separate procedure   report for more information.         XR CHEST 1 VIEW   Final Result   Placement of a deep line catheter on the right tip in the SVC.  Lung volumes   are low otherwise grossly clear with no pneumothorax.         CT ABDOMEN PELVIS WO CONTRAST Additional Contrast? None   Final Result   Presence of a 16 x 8 mm vertical oriented the calculus in the most proximal   left ureter, just below the level of the ureter pelvic junction (UPJ) causing   moderate to severe acute obstructive uropathy to the left kidney.             Medical Records/Labs/Images review:   I personally reviewed and summarized previous records   All labs and imaging were reviewed independently     ASSESSMENT & PLAN   Sravanthi Lr, a 68 y.o.-old female seen today for inpatient diabetes management    Diabetes Mellitus type 2 admitted with DKA  Patient's diabetes is uncontrolled, A1c 9.1% we recommend the following subcu insulin regimen  We recommend the following insulin bridging  Lantus 10 units daily  Low-dose sliding scale ACHS  Continue glucose check with meals and at bedtime  Will titrate insulin dose based on the blood glucose trend & insulin requirement  Upon discharge, I will arrange for the patient to be seen in the endocrinology clinic for routine diabetes maintenance and

## 2024-03-13 NOTE — CARE COORDINATION
Updated plan of care. Pt transfer from ICU. POD#2 cysto with left stent insertion. Long cath, iv fluids. Iv antibiotics changed to oral antibiotics. Monitor blood pressure. Pt is diabetic. Has glucometer. Pt on oral meds. Endo following-if pt needs insulin upon discharge-WILL NEED SCRIPTS. Pt lives alone but has help. Declining home care. Will follow-severinoo

## 2024-03-14 LAB
ALBUMIN SERPL-MCNC: 2.9 G/DL (ref 3.5–5.2)
ALP SERPL-CCNC: 106 U/L (ref 35–104)
ALT SERPL-CCNC: 13 U/L (ref 0–32)
ANION GAP SERPL CALCULATED.3IONS-SCNC: 12 MMOL/L (ref 7–16)
AST SERPL-CCNC: 19 U/L (ref 0–31)
BASOPHILS # BLD: 0.11 K/UL (ref 0–0.2)
BASOPHILS NFR BLD: 1 % (ref 0–2)
BILIRUB DIRECT SERPL-MCNC: 0.4 MG/DL (ref 0–0.3)
BILIRUB INDIRECT SERPL-MCNC: 0.6 MG/DL (ref 0–1)
BILIRUB SERPL-MCNC: 1 MG/DL (ref 0–1.2)
BUN SERPL-MCNC: 31 MG/DL (ref 6–23)
CALCIUM SERPL-MCNC: 8.2 MG/DL (ref 8.6–10.2)
CHLORIDE SERPL-SCNC: 105 MMOL/L (ref 98–107)
CO2 SERPL-SCNC: 20 MMOL/L (ref 22–29)
CREAT SERPL-MCNC: 1.4 MG/DL (ref 0.5–1)
EOSINOPHIL # BLD: 0.32 K/UL (ref 0.05–0.5)
EOSINOPHILS RELATIVE PERCENT: 3 % (ref 0–6)
ERYTHROCYTE [DISTWIDTH] IN BLOOD BY AUTOMATED COUNT: 13.6 % (ref 11.5–15)
GFR SERPL CREATININE-BSD FRML MDRD: 41 ML/MIN/1.73M2
GLUCOSE BLD-MCNC: 136 MG/DL (ref 74–99)
GLUCOSE BLD-MCNC: 159 MG/DL (ref 74–99)
GLUCOSE BLD-MCNC: 209 MG/DL (ref 74–99)
GLUCOSE BLD-MCNC: 224 MG/DL (ref 74–99)
GLUCOSE SERPL-MCNC: 200 MG/DL (ref 74–99)
HCT VFR BLD AUTO: 29.1 % (ref 34–48)
HGB BLD-MCNC: 9.6 G/DL (ref 11.5–15.5)
LACTATE BLDV-SCNC: 1.4 MMOL/L (ref 0.5–2.2)
LYMPHOCYTES NFR BLD: 0.53 K/UL (ref 1.5–4)
LYMPHOCYTES RELATIVE PERCENT: 4 % (ref 20–42)
MAGNESIUM SERPL-MCNC: 1.6 MG/DL (ref 1.6–2.6)
MCH RBC QN AUTO: 29.3 PG (ref 26–35)
MCHC RBC AUTO-ENTMCNC: 33 G/DL (ref 32–34.5)
MCV RBC AUTO: 88.7 FL (ref 80–99.9)
MONOCYTES NFR BLD: 1.16 K/UL (ref 0.1–0.95)
MONOCYTES NFR BLD: 10 % (ref 2–12)
MYELOCYTES ABSOLUTE COUNT: 0.11 K/UL
MYELOCYTES: 1 %
NEUTROPHILS NFR BLD: 82 % (ref 43–80)
NEUTS SEG NFR BLD: 9.89 K/UL (ref 1.8–7.3)
NUCLEATED RED BLOOD CELLS: 1 PER 100 WBC
PHOSPHATE SERPL-MCNC: 2 MG/DL (ref 2.5–4.5)
PLATELET # BLD AUTO: 87 K/UL (ref 130–450)
PLATELET CONFIRMATION: NORMAL
PMV BLD AUTO: 11.1 FL (ref 7–12)
POTASSIUM SERPL-SCNC: 3.5 MMOL/L (ref 3.5–5)
PROT SERPL-MCNC: 6.3 G/DL (ref 6.4–8.3)
RBC # BLD AUTO: 3.28 M/UL (ref 3.5–5.5)
RBC # BLD: NORMAL 10*6/UL
SODIUM SERPL-SCNC: 137 MMOL/L (ref 132–146)
WBC OTHER # BLD: 12.1 K/UL (ref 4.5–11.5)

## 2024-03-14 PROCEDURE — 51798 US URINE CAPACITY MEASURE: CPT

## 2024-03-14 PROCEDURE — 1200000000 HC SEMI PRIVATE

## 2024-03-14 PROCEDURE — 99232 SBSQ HOSP IP/OBS MODERATE 35: CPT | Performed by: INTERNAL MEDICINE

## 2024-03-14 PROCEDURE — 6370000000 HC RX 637 (ALT 250 FOR IP): Performed by: REGISTERED NURSE

## 2024-03-14 PROCEDURE — 6360000002 HC RX W HCPCS: Performed by: UROLOGY

## 2024-03-14 PROCEDURE — 6370000000 HC RX 637 (ALT 250 FOR IP): Performed by: INTERNAL MEDICINE

## 2024-03-14 PROCEDURE — 97165 OT EVAL LOW COMPLEX 30 MIN: CPT

## 2024-03-14 PROCEDURE — 82962 GLUCOSE BLOOD TEST: CPT

## 2024-03-14 PROCEDURE — 80053 COMPREHEN METABOLIC PANEL: CPT

## 2024-03-14 PROCEDURE — 97161 PT EVAL LOW COMPLEX 20 MIN: CPT

## 2024-03-14 PROCEDURE — 2580000003 HC RX 258: Performed by: UROLOGY

## 2024-03-14 PROCEDURE — 85025 COMPLETE CBC W/AUTO DIFF WBC: CPT

## 2024-03-14 PROCEDURE — 83735 ASSAY OF MAGNESIUM: CPT

## 2024-03-14 PROCEDURE — 83605 ASSAY OF LACTIC ACID: CPT

## 2024-03-14 PROCEDURE — 84100 ASSAY OF PHOSPHORUS: CPT

## 2024-03-14 PROCEDURE — 6370000000 HC RX 637 (ALT 250 FOR IP): Performed by: UROLOGY

## 2024-03-14 PROCEDURE — 36415 COLL VENOUS BLD VENIPUNCTURE: CPT

## 2024-03-14 PROCEDURE — 82248 BILIRUBIN DIRECT: CPT

## 2024-03-14 PROCEDURE — 6370000000 HC RX 637 (ALT 250 FOR IP): Performed by: GENERAL PRACTICE

## 2024-03-14 RX ORDER — INSULIN LISPRO 100 [IU]/ML
2 INJECTION, SOLUTION INTRAVENOUS; SUBCUTANEOUS
Status: DISCONTINUED | OUTPATIENT
Start: 2024-03-14 | End: 2024-03-18 | Stop reason: HOSPADM

## 2024-03-14 RX ORDER — HYDROCODONE BITARTRATE AND ACETAMINOPHEN 5; 325 MG/1; MG/1
1 TABLET ORAL EVERY 4 HOURS PRN
Status: DISCONTINUED | OUTPATIENT
Start: 2024-03-14 | End: 2024-03-18 | Stop reason: HOSPADM

## 2024-03-14 RX ORDER — INSULIN GLARGINE 100 [IU]/ML
11 INJECTION, SOLUTION SUBCUTANEOUS NIGHTLY
Status: DISCONTINUED | OUTPATIENT
Start: 2024-03-14 | End: 2024-03-18 | Stop reason: HOSPADM

## 2024-03-14 RX ADMIN — HYDROMORPHONE HYDROCHLORIDE 0.5 MG: 1 INJECTION, SOLUTION INTRAMUSCULAR; INTRAVENOUS; SUBCUTANEOUS at 05:40

## 2024-03-14 RX ADMIN — HYDROCODONE BITARTRATE AND ACETAMINOPHEN 1 TABLET: 5; 325 TABLET ORAL at 19:19

## 2024-03-14 RX ADMIN — PANTOPRAZOLE SODIUM 40 MG: 40 TABLET, DELAYED RELEASE ORAL at 05:40

## 2024-03-14 RX ADMIN — CEFDINIR 300 MG: 300 CAPSULE ORAL at 08:44

## 2024-03-14 RX ADMIN — CEFDINIR 300 MG: 300 CAPSULE ORAL at 20:39

## 2024-03-14 RX ADMIN — SODIUM CHLORIDE, PRESERVATIVE FREE 10 ML: 5 INJECTION INTRAVENOUS at 20:40

## 2024-03-14 RX ADMIN — INSULIN LISPRO 2 UNITS: 100 INJECTION, SOLUTION INTRAVENOUS; SUBCUTANEOUS at 11:36

## 2024-03-14 RX ADMIN — SODIUM CHLORIDE, PRESERVATIVE FREE 10 ML: 5 INJECTION INTRAVENOUS at 08:44

## 2024-03-14 RX ADMIN — INSULIN GLARGINE 11 UNITS: 100 INJECTION, SOLUTION SUBCUTANEOUS at 20:39

## 2024-03-14 RX ADMIN — INSULIN LISPRO 2 UNITS: 100 INJECTION, SOLUTION INTRAVENOUS; SUBCUTANEOUS at 16:28

## 2024-03-14 RX ADMIN — INSULIN LISPRO 1 UNITS: 100 INJECTION, SOLUTION INTRAVENOUS; SUBCUTANEOUS at 08:47

## 2024-03-14 RX ADMIN — HYDROCODONE BITARTRATE AND ACETAMINOPHEN 1 TABLET: 5; 325 TABLET ORAL at 11:36

## 2024-03-14 RX ADMIN — SODIUM BICARBONATE 650 MG: 650 TABLET ORAL at 08:44

## 2024-03-14 RX ADMIN — POLYETHYLENE GLYCOL 3350 17 G: 17 POWDER, FOR SOLUTION ORAL at 09:02

## 2024-03-14 ASSESSMENT — PAIN DESCRIPTION - PAIN TYPE: TYPE: ACUTE PAIN;SURGICAL PAIN

## 2024-03-14 ASSESSMENT — PAIN SCALES - GENERAL
PAINLEVEL_OUTOF10: 6
PAINLEVEL_OUTOF10: 8
PAINLEVEL_OUTOF10: 7
PAINLEVEL_OUTOF10: 5

## 2024-03-14 ASSESSMENT — PAIN DESCRIPTION - LOCATION: LOCATION: FLANK

## 2024-03-14 ASSESSMENT — PAIN DESCRIPTION - DESCRIPTORS
DESCRIPTORS: ACHING;SORE;TENDER
DESCRIPTORS: ACHING;DISCOMFORT;TENDER

## 2024-03-14 ASSESSMENT — PAIN DESCRIPTION - ORIENTATION
ORIENTATION: LEFT;POSTERIOR
ORIENTATION: LEFT

## 2024-03-14 NOTE — PROGRESS NOTES
P Quality Flow/Interdisciplinary Rounds Progress Note        Quality Flow Rounds held on March 14, 2024    Disciplines Attending:  Bedside Nurse, , , and Nursing Unit Leadership    Sravanthi Lr was admitted on 3/10/2024  6:51 PM    Anticipated Discharge Date:  Expected Discharge Date: 03/13/24    Disposition:    Job Score:  Job Scale Score: 20    Readmission Risk              Risk of Unplanned Readmission:  12           Discussed patient goal for the day, patient clinical progression, and barriers to discharge.  The following Goal(s) of the Day/Commitment(s) have been identified:   Discharge Planning      Amy Pacheco RN  March 14, 2024

## 2024-03-14 NOTE — PROGRESS NOTES
ENDOCRINOLOGY PROGRESS NOTE      Date of admission: 3/10/2024  Date of service: 3/14/2024  Admitting physician: Pascual Mcfarland DO   Primary Care Physician: Pascual Mcfarland DO  Consultant physician: Bo Vazquez MD     Reason for the consultation:  Uncontrolled DM    History of Present Illness:  The history is provided by the patient. Accuracy of the patient data is excellent    Sravanthi Lr is a very pleasant 68 y.o. old female with PMH of T2DM, HTN and other listed below admitted to [facility] on 3/10/2024 because of N/V and flank pain and found to be in DKA and has an obstructive kidney stone, endocrine service was consulted for diabetes management.    Subjective  The patient was seen and examined at bedside this afternoon.  No acute events overnight.  Her diet will be advanced this afternoon.    Inpatient diet:   Carb Restricted diet     Point of care glucose monitoring   (Independently reviewed)   Recent Labs     03/12/24  1551 03/12/24  2004 03/13/24  0513 03/13/24  1103 03/13/24  2131 03/14/24  0643 03/14/24  1131 03/14/24  1547   POCGLU 132* 114* 132* 144* 148* 159* 209* 136*   \  Scheduled Meds:   insulin lispro  2 Units SubCUTAneous TID WC    insulin glargine  11 Units SubCUTAneous Nightly    cefdinir  300 mg Oral 2 times per day    pantoprazole  40 mg Oral QAM AC    insulin lispro  0-6 Units SubCUTAneous TID WC    sodium chloride flush  5-40 mL IntraVENous 2 times per day    insulin lispro  0-4 Units SubCUTAneous Nightly       PRN Meds:   HYDROcodone 5 mg - acetaminophen, 1 tablet, Q4H PRN  glucose, 4 tablet, PRN  dextrose bolus, 125 mL, PRN   Or  dextrose bolus, 250 mL, PRN  glucagon (rDNA), 1 mg, PRN  dextrose, , Continuous PRN  sodium chloride flush, 5-40 mL, PRN  sodium chloride, , PRN  ondansetron, 4 mg, Q8H PRN   Or  ondansetron, 4 mg, Q6H PRN  polyethylene glycol, 17 g, Daily PRN  acetaminophen, 650 mg, Q6H PRN   Or  acetaminophen, 650 mg, Q6H PRN  HYDROmorphone, 0.5 mg, Q4H PRN      Continuous

## 2024-03-14 NOTE — PROGRESS NOTES
Occupational Therapy    OCCUPATIONAL THERAPY INITIAL EVALUATION    [facility]   [address], [place]         Date:3/14/2024                                                  Patient Name: Sravanthi Lr    MRN: [id number]    : 1955    Room: [address] [place] [address]      Evaluating OT: Toya Oates OTR/L   [id number]      Referring Provider:Pascual Mcfarland DO     Specific Provider Orders/Date:OT eval and treat 3/14/2024      Diagnosis:  Kidney stone [N20.0]  KRANTHI (acute kidney injury) (HCC) [N17.9]  DKA, type 1, not at goal (HCC) [E10.10]  Elevated lactic acid level [R79.89]  Nausea vomiting and diarrhea [R11.2, R19.7]  Hyperglycemia due to diabetes mellitus (HCC) [E11.65]    Procedure: CYSTOSCOPY RETROGRADE PYELOGRAM LEFT STENT INSERTION (Left) 3/11/2024    Precautions:  Fall Risk, L knee pain      Assessment of current deficits    [x] Functional mobility  [x]ADLs  [x] Strength               []Cognition    [x] Functional transfers   [x] IADLs         [x] Safety Awareness   [x]Endurance    [] Fine Coordination              [x] Balance      [] Vision/perception   []Sensation     []Gross Motor Coordination  [] ROM  [] Delirium                   [] Motor Control     OT PLAN OF CARE   OT POC based on physician orders, patient diagnosis and results of clinical assessment    Frequency/Duration  2-4 days/wk for 2 - 4weeks PRN   Specific OT Treatment Interventions to include:   ADL retraining/adapted techniques and AE recommendations to increase functional independence within precautions                    Energy conservation techniques to improve tolerance for selfcare routine   Functional transfer/mobility training/DME recommendations for increased independence, safety and fall prevention         Patient/family education to increase safety and functional independence             Environmental modifications for safe mobility and completion of ADLs                           c/o numbness or tingling   Tone: WFL   Edema: none observed     Comments: Upon arrival patient lying in bed .  At end of session, patient sitting in chair  with call light and phone within reach, all lines and tubes intact.      Overall patient demonstrated  decreased independence and safety during completion of ADL/functional transfer/mobility tasks.  Pt would benefit from continued skilled OT to increase safety and independence with completion of ADL/IADL tasks for functional independence and quality of life.        Rehab Potential: good for established goals     Patient / Family Goal: none stated       Patient and/or family were instructed on functional diagnosis, prognosis/goals and OT plan of care. Demonstrated good  understanding.     Eval Complexity: Low    Time In: 0930  Time Out: 0950      Min Units   OT Eval Low 97165 x  1   OT Eval Medium 82688      OT Eval High 08418      OT Re-Eval 69359       Therapeutic Ex 46060      Therapeutic Activities 66071       ADL/Self Care 50031      Orthotic Management 40912       Manual 66441     Neuro Re-Ed 89207       Non-Billable Time      OT Re eval 63989        Evaluation Time additionally includes thorough review of current medical information, gathering information on past medical history/social history and prior level of function, interpretation of standardized testing/informal observation of tasks, assessment of data and development of plan of care and goals.            Toya Oates  OTR/L  OT [id number]

## 2024-03-14 NOTE — CARE COORDINATION
Updated plan of care. POD#3. Long cath removed this am. Check post void residual after pt voids. Antibiotics switched to po. Pt has DME at home. If going with insulin, endocrinology to write scripts. Declining home care services. Will follow. Possible discharge this afternoon-o

## 2024-03-14 NOTE — PLAN OF CARE
Problem: Discharge Planning  Goal: Discharge to home or other facility with appropriate resources  3/14/2024 1218 by Daina Davis RN  Outcome: Progressing     Problem: Pain  Goal: Verbalizes/displays adequate comfort level or baseline comfort level  3/14/2024 1218 by Daina Davis RN  Outcome: Progressing     Problem: ABCDS Injury Assessment  Goal: Absence of physical injury  3/14/2024 1218 by Daina Davis RN  Outcome: Progressing     Problem: Safety - Adult  Goal: Free from fall injury  3/14/2024 1218 by Daina Davis RN  Outcome: Progressing     Problem: Chronic Conditions and Co-morbidities  Goal: Patient's chronic conditions and co-morbidity symptoms are monitored and maintained or improved  3/14/2024 1218 by Daina Davis RN  Outcome: Progressing     Problem: Nutrition Deficit:  Goal: Optimize nutritional status  3/14/2024 1218 by Daina Davis RN  Outcome: Progressing     Problem: Skin/Tissue Integrity  Goal: Absence of new skin breakdown  Description: 1.  Monitor for areas of redness and/or skin breakdown  2.  Assess vascular access sites hourly  3.  Every 4-6 hours minimum:  Change oxygen saturation probe site  4.  Every 4-6 hours:  If on nasal continuous positive airway pressure, respiratory therapy assess nares and determine need for appliance change or resting period.  Outcome: Progressing

## 2024-03-14 NOTE — PROGRESS NOTES
Discontinued powell cath. Per do.   Instructed patient due to void 1130 to use call light for assistance out of.  Patient verbalizes understanding of above.

## 2024-03-14 NOTE — PROGRESS NOTES
Physical Therapy  Facility/Department: [address]  Physical Therapy Initial Assessment    Name: Sravanthi Lr  : 1955  MRN: [id number]  Date of Service: 3/14/2024             Patient Diagnosis(es): The primary encounter diagnosis was KRANTHI (acute kidney injury) (HCC). Diagnoses of Nausea vomiting and diarrhea, Hyperglycemia due to diabetes mellitus (HCC), Elevated lactic acid level, Renal calculus, left, and Diabetic ketoacidosis without coma associated with other specified diabetes mellitus (HCC) were also pertinent to this visit.  Past Medical History:  has a past medical history of Diabetes mellitus (HCC) and Hypertension.  Past Surgical History:  has a past surgical history that includes knee surgery; fracture surgery; Carpal tunnel release; Appendectomy; Upper gastrointestinal endoscopy (N/A, 2019); Upper gastrointestinal endoscopy (N/A, 2019); Colonoscopy (N/A, 2019); and Wound debridement (Left, 3/11/2024).         Requires PT Follow-Up: Yes       Evaluating Therapist: Shruti Roldan PT     Referring Provider:  Pascaul Mcfarland DO     PT order : PT eval and treat     Room #: 726  DIAGNOSIS: The primary encounter diagnosis was KRANTHI (acute kidney injury) (HCC). Diagnoses of Nausea vomiting and diarrhea, Hyperglycemia due to diabetes mellitus (HCC), Elevated lactic acid level, Renal calculus, left, and Diabetic ketoacidosis without coma associated with other specified diabetes mellitus (HCC) were also pertinent to this visit. S/p CYSTOSCOPY RETROGRADE PYELOGRAM LEFT STENT INSERTION     PRECAUTIONS: falls    Social:  Pt lives alone  in a  multi level  floor plan 6-8  steps and 1 rail between levels, 2 steps with no HR  to enter.  Prior to admission pt walked with cane PRN . Pt reports independent PTA      Initial Evaluation  Date: 3/14/2024  Treatment      Short Term/ Long Term   Goals   Was pt agreeable to Eval/treatment?  Yes      Does pt have pain?  L knee      Bed Mobility   diagnosis, prognosis, and plan of care. -  yes     PLAN  PT care will be provided in accordance with the objectives noted above.  Whenever appropriate, clear delegation orders will be provided for nursing staff.  Exercises and functional mobility practice will be used as well as appropriate assistive devices or modalities to obtain goals. Patient and family education will also be administered as needed.        PLAN OF CARE:    Current Treatment Recommendations     [x] Strengthening to improve independence with functional mobility   [] ROM to improve independence with functional mobility   [x] Balance Training to improve static/dynamic balance and to reduce fall risk  [x] Endurance Training to improve activity tolerance during functional mobility   [x] Transfer Training to improve safety and independence with all functional transfers   [x] Gait Training to improve gait mechanics, endurance and assess need for appropriate assistive device  [x] Stair Training in preparation for safe discharge home and/or into the community   [x] Positioning to prevent skin breakdown and contractures  [x] Safety and Education Training   [x] Patient/Caregiver Education   [] HEP  [] Other     Frequency of treatments will be 2-5x/week x 3-10 days.    Time in:  0925   Time out: 0945       Evaluation Time includes thorough review of current medical information, gathering information on past medical history/social history and prior level of function, completion of standardized testing/informal observation of tasks, assessment of data and education on plan of care and goals.    CPT codes:  [x] Low Complexity PT evaluation 98749  [] Moderate Complexity PT evaluation 95457  [] High Complexity PT evaluation 94945  [] PT Re-evaluation 51624  [] Gait training 27795  minutes  [] Therapeutic activities 36453  minutes  [] Therapeutic exercises 24929  minutes  [] Neuromuscular reeducation 06794  minutes       Shruti Roldan  License number:  [id number]

## 2024-03-14 NOTE — PROGRESS NOTES
Patient seen feels fair. Still no solid food, nausea. Co flank pain fairly controlled with norco. Blood sugars good. Not wanting to take insulin. Discussed the need for type 1 diabetes. Encourage ambulation.

## 2024-03-14 NOTE — PLAN OF CARE
Problem: Discharge Planning  Goal: Discharge to home or other facility with appropriate resources  Outcome: Progressing     Problem: Pain  Goal: Verbalizes/displays adequate comfort level or baseline comfort level  Outcome: Progressing     Problem: ABCDS Injury Assessment  Goal: Absence of physical injury  Outcome: Progressing     Problem: Safety - Adult  Goal: Free from fall injury  Outcome: Progressing     Problem: Chronic Conditions and Co-morbidities  Goal: Patient's chronic conditions and co-morbidity symptoms are monitored and maintained or improved  Outcome: Progressing  Flowsheets (Taken 3/13/2024 2030)  Care Plan - Patient's Chronic Conditions and Co-Morbidity Symptoms are Monitored and Maintained or Improved: Monitor and assess patient's chronic conditions and comorbid symptoms for stability, deterioration, or improvement     Problem: Nutrition Deficit:  Goal: Optimize nutritional status  Outcome: Progressing

## 2024-03-14 NOTE — PROGRESS NOTES
[facility]  [facility]  Progress Note    SUBJECTIVE:  Chief Complaint   Patient presents with    Abdominal Pain    Emesis    Diarrhea     Recently changed diabetic medication    Hyperglycemia     Patient resting in bed.  Has a nonproductive cough she attributes to the tubes she had while in ICU and also the endotracheal tube she had during had surgery.  Patient is tolerating medications. No reported adverse drug reactions.  No nausea, vomiting, diarrhea.    Review of systems:  As stated above in the chief complaint, otherwise negative.    Medications:  Scheduled Meds:   cefdinir  300 mg Oral 2 times per day    pantoprazole  40 mg Oral QAM AC    insulin lispro  0-6 Units SubCUTAneous TID WC    sodium chloride flush  5-40 mL IntraVENous 2 times per day    insulin glargine  10 Units SubCUTAneous Nightly    insulin lispro  0-4 Units SubCUTAneous Nightly     Continuous Infusions:   dextrose      sodium chloride       PRN Meds:glucose, dextrose bolus **OR** dextrose bolus, glucagon (rDNA), dextrose, sodium chloride flush, sodium chloride, ondansetron **OR** ondansetron, polyethylene glycol, acetaminophen **OR** acetaminophen, HYDROmorphone    OBJECTIVE:  BP (!) 148/73   Pulse 83   Temp 98.8 °F (37.1 °C) (Oral)   Resp 16   Ht 1.549 m (5' 1\")   Wt 73.5 kg (162 lb 0.6 oz)   SpO2 96%   BMI 30.62 kg/m²   Temp  Av.7 °F (37.1 °C)  Min: 98.2 °F (36.8 °C)  Max: 99 °F (37.2 °C)  Constitutional: Appears ill  Skin: Warm and dry. No rashes were noted.   Neuro: Alert and oriented  HEENT: Round and reactive pupils.  Moist mucous membranes.  No ulcerations or thrush.  Chest: .Respirations unlabored. Breath sounds clear.   Cardiovascular: RRR  Abdomen: Abdomen soft.  Distended.  Tender.  Bowel sounds present.  Extremities: Bilateral lower extremity edema 1+    Lines: PIV      Laboratory and Tests:  Lab Results   Component Value Date    WBC 11.4 2024    WBC 14.2 (H) 2024    WBC 13.3

## 2024-03-15 ENCOUNTER — APPOINTMENT (OUTPATIENT)
Dept: GENERAL RADIOLOGY | Age: 69
DRG: 853 | End: 2024-03-15
Payer: MEDICARE

## 2024-03-15 LAB
ALBUMIN SERPL-MCNC: 2.7 G/DL (ref 3.5–5.2)
ALP SERPL-CCNC: 102 U/L (ref 35–104)
ALT SERPL-CCNC: 13 U/L (ref 0–32)
ANION GAP SERPL CALCULATED.3IONS-SCNC: 14 MMOL/L (ref 7–16)
AST SERPL-CCNC: 15 U/L (ref 0–31)
ATYPICAL LYMPHOCYTE ABSOLUTE COUNT: 0.25 K/UL (ref 0–0.46)
ATYPICAL LYMPHOCYTES: 2 % (ref 0–4)
BASOPHILS # BLD: 0 K/UL (ref 0–0.2)
BASOPHILS NFR BLD: 0 % (ref 0–2)
BILIRUB DIRECT SERPL-MCNC: 0.3 MG/DL (ref 0–0.3)
BILIRUB INDIRECT SERPL-MCNC: 0.5 MG/DL (ref 0–1)
BILIRUB SERPL-MCNC: 0.8 MG/DL (ref 0–1.2)
BUN SERPL-MCNC: 25 MG/DL (ref 6–23)
CALCIUM SERPL-MCNC: 8.2 MG/DL (ref 8.6–10.2)
CHLORIDE SERPL-SCNC: 105 MMOL/L (ref 98–107)
CO2 SERPL-SCNC: 19 MMOL/L (ref 22–29)
CREAT SERPL-MCNC: 1.3 MG/DL (ref 0.5–1)
EOSINOPHIL # BLD: 0.12 K/UL (ref 0.05–0.5)
EOSINOPHILS RELATIVE PERCENT: 1 % (ref 0–6)
ERYTHROCYTE [DISTWIDTH] IN BLOOD BY AUTOMATED COUNT: 13.7 % (ref 11.5–15)
GFR SERPL CREATININE-BSD FRML MDRD: 46 ML/MIN/1.73M2
GLUCOSE BLD-MCNC: 137 MG/DL (ref 74–99)
GLUCOSE BLD-MCNC: 155 MG/DL (ref 74–99)
GLUCOSE BLD-MCNC: 173 MG/DL (ref 74–99)
GLUCOSE BLD-MCNC: 184 MG/DL (ref 74–99)
GLUCOSE BLD-MCNC: 203 MG/DL (ref 74–99)
GLUCOSE SERPL-MCNC: 171 MG/DL (ref 74–99)
HCT VFR BLD AUTO: 29.1 % (ref 34–48)
HGB BLD-MCNC: 9.4 G/DL (ref 11.5–15.5)
LYMPHOCYTES NFR BLD: 0.37 K/UL (ref 1.5–4)
LYMPHOCYTES RELATIVE PERCENT: 3 % (ref 20–42)
MAGNESIUM SERPL-MCNC: 1.5 MG/DL (ref 1.6–2.6)
MCH RBC QN AUTO: 28.4 PG (ref 26–35)
MCHC RBC AUTO-ENTMCNC: 32.3 G/DL (ref 32–34.5)
MCV RBC AUTO: 87.9 FL (ref 80–99.9)
METAMYELOCYTES ABSOLUTE COUNT: 0.12 K/UL (ref 0–0.12)
METAMYELOCYTES: 1 % (ref 0–1)
MONOCYTES NFR BLD: 0.37 K/UL (ref 0.1–0.95)
MONOCYTES NFR BLD: 3 % (ref 2–12)
NEUTROPHILS NFR BLD: 91 % (ref 43–80)
NEUTS SEG NFR BLD: 12.86 K/UL (ref 1.8–7.3)
PHOSPHATE SERPL-MCNC: 2.3 MG/DL (ref 2.5–4.5)
PLATELET # BLD AUTO: 109 K/UL (ref 130–450)
PMV BLD AUTO: 11.2 FL (ref 7–12)
POTASSIUM SERPL-SCNC: 3.6 MMOL/L (ref 3.5–5)
PROT SERPL-MCNC: 6.2 G/DL (ref 6.4–8.3)
RBC # BLD AUTO: 3.31 M/UL (ref 3.5–5.5)
RBC # BLD: ABNORMAL 10*6/UL
RBC # BLD: ABNORMAL 10*6/UL
SODIUM SERPL-SCNC: 138 MMOL/L (ref 132–146)
WBC OTHER # BLD: 14.1 K/UL (ref 4.5–11.5)

## 2024-03-15 PROCEDURE — 82248 BILIRUBIN DIRECT: CPT

## 2024-03-15 PROCEDURE — 85025 COMPLETE CBC W/AUTO DIFF WBC: CPT

## 2024-03-15 PROCEDURE — 6370000000 HC RX 637 (ALT 250 FOR IP): Performed by: GENERAL PRACTICE

## 2024-03-15 PROCEDURE — 72100 X-RAY EXAM L-S SPINE 2/3 VWS: CPT

## 2024-03-15 PROCEDURE — 83735 ASSAY OF MAGNESIUM: CPT

## 2024-03-15 PROCEDURE — 80053 COMPREHEN METABOLIC PANEL: CPT

## 2024-03-15 PROCEDURE — 84100 ASSAY OF PHOSPHORUS: CPT

## 2024-03-15 PROCEDURE — 6370000000 HC RX 637 (ALT 250 FOR IP): Performed by: INTERNAL MEDICINE

## 2024-03-15 PROCEDURE — 2580000003 HC RX 258: Performed by: UROLOGY

## 2024-03-15 PROCEDURE — 1200000000 HC SEMI PRIVATE

## 2024-03-15 PROCEDURE — 6370000000 HC RX 637 (ALT 250 FOR IP): Performed by: REGISTERED NURSE

## 2024-03-15 PROCEDURE — 82962 GLUCOSE BLOOD TEST: CPT

## 2024-03-15 PROCEDURE — 99232 SBSQ HOSP IP/OBS MODERATE 35: CPT | Performed by: INTERNAL MEDICINE

## 2024-03-15 PROCEDURE — 71100 X-RAY EXAM RIBS UNI 2 VIEWS: CPT

## 2024-03-15 RX ORDER — LANOLIN ALCOHOL/MO/W.PET/CERES
400 CREAM (GRAM) TOPICAL
Status: DISCONTINUED | OUTPATIENT
Start: 2024-03-15 | End: 2024-03-18 | Stop reason: HOSPADM

## 2024-03-15 RX ADMIN — HYDROCODONE BITARTRATE AND ACETAMINOPHEN 1 TABLET: 5; 325 TABLET ORAL at 16:51

## 2024-03-15 RX ADMIN — INSULIN LISPRO 2 UNITS: 100 INJECTION, SOLUTION INTRAVENOUS; SUBCUTANEOUS at 16:50

## 2024-03-15 RX ADMIN — INSULIN LISPRO 2 UNITS: 100 INJECTION, SOLUTION INTRAVENOUS; SUBCUTANEOUS at 10:10

## 2024-03-15 RX ADMIN — SODIUM CHLORIDE, PRESERVATIVE FREE 10 ML: 5 INJECTION INTRAVENOUS at 10:14

## 2024-03-15 RX ADMIN — INSULIN LISPRO 1 UNITS: 100 INJECTION, SOLUTION INTRAVENOUS; SUBCUTANEOUS at 16:49

## 2024-03-15 RX ADMIN — INSULIN LISPRO 2 UNITS: 100 INJECTION, SOLUTION INTRAVENOUS; SUBCUTANEOUS at 14:50

## 2024-03-15 RX ADMIN — HYDROCODONE BITARTRATE AND ACETAMINOPHEN 1 TABLET: 5; 325 TABLET ORAL at 04:21

## 2024-03-15 RX ADMIN — HYDROCODONE BITARTRATE AND ACETAMINOPHEN 1 TABLET: 5; 325 TABLET ORAL at 20:54

## 2024-03-15 RX ADMIN — INSULIN GLARGINE 11 UNITS: 100 INJECTION, SOLUTION SUBCUTANEOUS at 20:56

## 2024-03-15 RX ADMIN — HYDROCODONE BITARTRATE AND ACETAMINOPHEN 1 TABLET: 5; 325 TABLET ORAL at 10:13

## 2024-03-15 RX ADMIN — PANTOPRAZOLE SODIUM 40 MG: 40 TABLET, DELAYED RELEASE ORAL at 10:12

## 2024-03-15 RX ADMIN — CEFDINIR 300 MG: 300 CAPSULE ORAL at 10:13

## 2024-03-15 RX ADMIN — INSULIN LISPRO 2 UNITS: 100 INJECTION, SOLUTION INTRAVENOUS; SUBCUTANEOUS at 10:09

## 2024-03-15 RX ADMIN — SODIUM CHLORIDE, PRESERVATIVE FREE 10 ML: 5 INJECTION INTRAVENOUS at 20:57

## 2024-03-15 RX ADMIN — CEFDINIR 300 MG: 300 CAPSULE ORAL at 20:55

## 2024-03-15 RX ADMIN — Medication 400 MG: at 10:12

## 2024-03-15 ASSESSMENT — PAIN DESCRIPTION - LOCATION
LOCATION: BACK
LOCATION: BACK
LOCATION: FLANK

## 2024-03-15 ASSESSMENT — PAIN - FUNCTIONAL ASSESSMENT
PAIN_FUNCTIONAL_ASSESSMENT: ACTIVITIES ARE NOT PREVENTED
PAIN_FUNCTIONAL_ASSESSMENT: PREVENTS OR INTERFERES SOME ACTIVE ACTIVITIES AND ADLS

## 2024-03-15 ASSESSMENT — PAIN SCALES - GENERAL
PAINLEVEL_OUTOF10: 5
PAINLEVEL_OUTOF10: 4
PAINLEVEL_OUTOF10: 6
PAINLEVEL_OUTOF10: 2
PAINLEVEL_OUTOF10: 7
PAINLEVEL_OUTOF10: 5
PAINLEVEL_OUTOF10: 2

## 2024-03-15 ASSESSMENT — PAIN DESCRIPTION - DESCRIPTORS
DESCRIPTORS: ACHING;DISCOMFORT;SORE;TENDER
DESCRIPTORS: DULL;DISCOMFORT;CRUSHING
DESCRIPTORS: DULL

## 2024-03-15 ASSESSMENT — PAIN DESCRIPTION - PAIN TYPE: TYPE: ACUTE PAIN;SURGICAL PAIN

## 2024-03-15 ASSESSMENT — PAIN DESCRIPTION - FREQUENCY
FREQUENCY: INTERMITTENT
FREQUENCY: INTERMITTENT

## 2024-03-15 ASSESSMENT — PAIN DESCRIPTION - ONSET
ONSET: GRADUAL
ONSET: ON-GOING

## 2024-03-15 ASSESSMENT — PAIN DESCRIPTION - ORIENTATION
ORIENTATION: LOWER
ORIENTATION: LEFT
ORIENTATION: LEFT

## 2024-03-15 NOTE — PROGRESS NOTES
chloride         Review of Systems  All systems reviewed. All negative except for symptoms mentioned in HPI     OBJECTIVE    BP (!) 143/66   Pulse 85   Temp 98.4 °F (36.9 °C) (Oral)   Resp 15   Ht 1.549 m (5' 1\")   Wt 73.5 kg (162 lb 0.6 oz)   SpO2 97%   BMI 30.62 kg/m²     Intake/Output Summary (Last 24 hours) at 3/15/2024 0800  Last data filed at 3/15/2024 0300  Gross per 24 hour   Intake 360 ml   Output 1650 ml   Net -1290 ml       Physical examination:  General: awake alert, oriented x3  HEENT: normocephalic non traumatic, no exophthalmos   Neck: supple, No thyroid tenderness,  Pulm: good equal air entry no added sounds  CVS: S1 + S2  Abd: soft lax, no tenderness  Skin: warm, no lesions, no rash. No open wounds, no ulcers   Neuro: CN intact, sensation decreased bilateral , muscle power normal  Psych: normal mood, and affect    Review of Laboratory Data:  I personally reviewed the following labs:   Recent Labs     03/13/24  0503 03/14/24  1100 03/15/24  0250   WBC 11.4 12.1* 14.1*   RBC 3.07* 3.28* 3.31*   HGB 9.0* 9.6* 9.4*   HCT 28.0* 29.1* 29.1*   MCV 91.2 88.7 87.9   MCH 29.3 29.3 28.4   MCHC 32.1 33.0 32.3   RDW 13.8 13.6 13.7   PLT  --  87* 109*   MPV 11.4 11.1 11.2     Recent Labs     03/13/24  0503 03/14/24  1100 03/15/24  0250    137 138   K 3.8 3.5 3.6    105 105   CO2 15* 20* 19*   BUN 45* 31* 25*   CREATININE 1.7* 1.4* 1.3*   GLUCOSE 143* 200* 171*   CALCIUM 7.7* 8.2* 8.2*   PROT 5.9* 6.3* 6.2*   LABALBU 2.8* 2.9* 2.7*   BILITOT 0.7 1.0 0.8   ALKPHOS 102 106* 102   AST 15 19 15   ALT 12 13 13     Beta-Hydroxybutyrate   Date Value Ref Range Status   03/10/2024 2.94 (H) 0.02 - 0.27 mmol/L Final     Lab Results   Component Value Date/Time    LABA1C 9.1 03/11/2024 06:45 AM    LABA1C 9.1 03/11/2024 05:40 AM    LABA1C 4.7 10/24/2022 12:00 PM     No results found for: \"TSH\", \"T4FREE\", \"Z5KOCBZ\", \"FT3\", \"D9AFJMB\", \"TSI\", \"TPOABS\", \"THGAB\"  Lab Results   Component Value Date/Time    LABA1C  negative consequences of dietary noncompliance on blood glucose control, blood pressure and weight      Interdisciplinary plan for communication with healthcare providers:   Consult recommendations were discussed with the Primary Service/Nursing staff      The above issues were reviewed with the patient who understood and agreed with the plan.    Thank you for allowing us to participate in the care of this patient. Please do not hesitate to contact us with any additional questions.     Bo Vazquez MD  Endocrinologist, [facility]   [address] [place] [id number]   Phone: [phone]  Fax: [phone]  --------------------------------------------  An electronic signature was used to authenticate this note. Bo Vazquez MD on 3/15/2024 at 8:00 AM

## 2024-03-15 NOTE — CARE COORDINATION
Updated plan of care. Discussed possible ZE or home health and pt continues to decline. States she has help at home. She has equipment. C/o rib pain. X-rays to be orders. Will continue to follow. If insulin is needed, cora to write scripts-anthony

## 2024-03-15 NOTE — PLAN OF CARE
Problem: Discharge Planning  Goal: Discharge to home or other facility with appropriate resources  3/15/2024 1451 by Sarah James RN  Outcome: Progressing  3/15/2024 0156 by Angelica Perez RN  Outcome: Progressing     Problem: Pain  Goal: Verbalizes/displays adequate comfort level or baseline comfort level  3/15/2024 1451 by Sarah James RN  Outcome: Progressing  3/15/2024 0156 by Angelica Perez RN  Outcome: Progressing     Problem: ABCDS Injury Assessment  Goal: Absence of physical injury  3/15/2024 1451 by Sarah James RN  Outcome: Progressing  3/15/2024 0156 by Angelica Perez RN  Outcome: Progressing     Problem: Safety - Adult  Goal: Free from fall injury  3/15/2024 1451 by Sarah James RN  Outcome: Progressing  3/15/2024 0156 by Angelica Perez RN  Outcome: Progressing     Problem: Chronic Conditions and Co-morbidities  Goal: Patient's chronic conditions and co-morbidity symptoms are monitored and maintained or improved  3/15/2024 1451 by Sarah James RN  Outcome: Progressing  3/15/2024 0156 by Angelica Perez RN  Outcome: Progressing     Problem: Nutrition Deficit:  Goal: Optimize nutritional status  3/15/2024 1451 by Sarah James RN  Outcome: Progressing  3/15/2024 0156 by Angelica Perez RN  Outcome: Progressing     Problem: Skin/Tissue Integrity  Goal: Absence of new skin breakdown  Description: 1.  Monitor for areas of redness and/or skin breakdown  2.  Assess vascular access sites hourly  3.  Every 4-6 hours minimum:  Change oxygen saturation probe site  4.  Every 4-6 hours:  If on nasal continuous positive airway pressure, respiratory therapy assess nares and determine need for appliance change or resting period.  3/15/2024 0156 by Angelica Perez RN  Outcome: Progressing

## 2024-03-15 NOTE — PLAN OF CARE
Problem: Discharge Planning  Goal: Discharge to home or other facility with appropriate resources  3/15/2024 0156 by Angelica Perez RN  Outcome: Progressing     Problem: Pain  Goal: Verbalizes/displays adequate comfort level or baseline comfort level  3/15/2024 0156 by Angelica Perez RN  Outcome: Progressing     Problem: ABCDS Injury Assessment  Goal: Absence of physical injury  3/15/2024 0156 by Angelica Perez RN  Outcome: Progressing     Problem: Safety - Adult  Goal: Free from fall injury  3/15/2024 0156 by Angelica Perez RN  Outcome: Progressing     Problem: Chronic Conditions and Co-morbidities  Goal: Patient's chronic conditions and co-morbidity symptoms are monitored and maintained or improved  3/15/2024 0156 by Angelica Perez RN  Outcome: Progressing     Problem: Nutrition Deficit:  Goal: Optimize nutritional status  3/15/2024 0156 by Angelica Perez RN  Outcome: Progressing     Problem: Skin/Tissue Integrity  Goal: Absence of new skin breakdown  Description: 1.  Monitor for areas of redness and/or skin breakdown  2.  Assess vascular access sites hourly  3.  Every 4-6 hours minimum:  Change oxygen saturation probe site  4.  Every 4-6 hours:  If on nasal continuous positive airway pressure, respiratory therapy assess nares and determine need for appliance change or resting period.  3/15/2024 0156 by Angelica Perez RN  Outcome: Progressing

## 2024-03-15 NOTE — PROGRESS NOTES
Call placed to Dr. Mcfarland in regards to pts morning labs. New orders placed and will reevaluate morning labs tomorrow per dr. Mcfarland.

## 2024-03-15 NOTE — PROGRESS NOTES
P Quality Flow/Interdisciplinary Rounds Progress Note        Quality Flow Rounds held on March 15, 2024    Disciplines Attending:  Bedside Nurse, , , and Nursing Unit Leadership    Sravanthi Lr was admitted on 3/10/2024  6:51 PM    Anticipated Discharge Date:  Expected Discharge Date: 03/13/24    Disposition:    Job Score:  Job Scale Score: 19    Readmission Risk              Risk of Unplanned Readmission:  13           Discussed patient goal for the day, patient clinical progression, and barriers to discharge.  The following Goal(s) of the Day/Commitment(s) have been identified:   Discharge planning      Evy Carmona RN  March 15, 2024

## 2024-03-15 NOTE — PROGRESS NOTES
Occupational Therapy    Attempted OT treatment, patient off floor for testing. OT to re-attempt at a later date/time as able and appropriate.     Noreen Dalal OTR/L  License Number: [id number]

## 2024-03-15 NOTE — PROGRESS NOTES
[facility]  [facility]  Progress Note    SUBJECTIVE:  Chief Complaint   Patient presents with    Abdominal Pain    Emesis    Diarrhea     Recently changed diabetic medication    Hyperglycemia     Patient is tolerating medications. No reported adverse drug reactions.  Still with left flank pain - thinks its from her stent and large stone  Overall she seems to be improving  No fevers     Review of systems:  As stated above in the chief complaint, otherwise negative.    Medications:  Scheduled Meds:   magnesium oxide  400 mg Oral Daily with breakfast    insulin lispro  2 Units SubCUTAneous TID WC    insulin glargine  11 Units SubCUTAneous Nightly    cefdinir  300 mg Oral 2 times per day    pantoprazole  40 mg Oral QAM AC    insulin lispro  0-6 Units SubCUTAneous TID WC    sodium chloride flush  5-40 mL IntraVENous 2 times per day    insulin lispro  0-4 Units SubCUTAneous Nightly     Continuous Infusions:   dextrose      sodium chloride       PRN Meds:Glucose, HYDROcodone 5 mg - acetaminophen, dextrose bolus **OR** dextrose bolus, glucagon (rDNA), dextrose, sodium chloride flush, sodium chloride, ondansetron **OR** ondansetron, polyethylene glycol, acetaminophen **OR** acetaminophen, HYDROmorphone    OBJECTIVE:  BP (!) 143/66   Pulse 85   Temp 98.4 °F (36.9 °C) (Oral)   Resp 16   Ht 1.549 m (5' 1\")   Wt 73.5 kg (162 lb 0.6 oz)   SpO2 97%   BMI 30.62 kg/m²   Temp  Av.3 °F (36.8 °C)  Min: 97.4 °F (36.3 °C)  Max: 98.6 °F (37 °C)  Constitutional: The patient is awake, alert, and oriented. Sitting up in bed, in no distress.  Skin: Warm and dry. No rashes were noted.   HEENT: Round and reactive pupils.  Moist mucous membranes.  No ulcerations or thrush.  Neck: Supple to movements.   Chest: No use of accessory muscles to breathe. Symmetrical expansion.  No wheezing, crackles or rhonchi.  Cardiovascular: S1 and S2 are rhythmic and regular. No murmurs appreciated.   Abdomen: Positive  bowel sounds to auscultation. Benign to palpation. No masses felt. Soft. +left flank pain +CVA tednerness on the left  Extremities: No edema.  Lines: Peripheral.    Laboratory and Tests:  Lab Results   Component Value Date    .0 (H) 03/11/2024    CRP 0.3 06/23/2023    CRP <0.3 05/25/2023     Lab Results   Component Value Date    SEDRATE 109 (H) 03/11/2024    SEDRATE 54 (H) 06/23/2023    SEDRATE 55 (H) 05/25/2023       Radiology:  Reviewed     Microbiology:   Respiratory panel: Negative  Blood cultures 3/11/2024: Negative so far  Urine culture 3/11/2024: >100 K E. Coli (pansensitive)  Nares screen MRSA: Negative    No results for input(s): \"PROCAL\" in the last 72 hours.      ASSESSMENT:  Complicated UTI associated to obstructing ureteral stone.  Status post cystoscopy 3/11/2024  Hypotension secondary to sepsis-improved  Leukocytosis associated to complicated UTI-improving  Acute kidney injury- Improving    PLAN:  Continue Cefdinir 300mg BID - for a total of 7 days - day 3/7   Labs and cultures reviewed   Ok to discharge from ID standpoint     JENNI Bell - CNP  10:31 AM  3/15/2024    Patient seen and examined. I had a face to face encounter with the patient. Agree with exam.  Assessment and plan as outlined above and directed by me. Addition and corrections were done as deemed appropriate. My exam and plan include: The patient is complaining of left flank pain.  She does have some mild CVA tenderness.  It is relieved by pain medications.  She is tolerating the oral antibiotic.  She is to complete 7 days total.  She can be discharged from ID standpoint.  She will need a urine culture 1 week before her planned urological procedure.    Manuelito Moreno MD  3/15/2024  1:19 PM

## 2024-03-15 NOTE — PROGRESS NOTES
Upon rounding pt stated she didn't eat any part of her dinner including her regular diet as well as her clear liquids. Pt tried a saltine cracker and a popsicle and was able to get that down. Pt stated she doesn't have much of an appetite. Rn encouraged p.o intake and ambulation. Pt ambulated to the restroom and was having difficulty. Arms and legs have some edema but not pitting. Told pt she needs to eat more to get her strength and ambulate.

## 2024-03-16 LAB
ALBUMIN SERPL-MCNC: 2.9 G/DL (ref 3.5–5.2)
ALP SERPL-CCNC: 105 U/L (ref 35–104)
ALT SERPL-CCNC: 12 U/L (ref 0–32)
ANION GAP SERPL CALCULATED.3IONS-SCNC: 10 MMOL/L (ref 7–16)
AST SERPL-CCNC: 15 U/L (ref 0–31)
BASOPHILS # BLD: 0 K/UL (ref 0–0.2)
BASOPHILS NFR BLD: 0 % (ref 0–2)
BILIRUB DIRECT SERPL-MCNC: 0.2 MG/DL (ref 0–0.3)
BILIRUB INDIRECT SERPL-MCNC: 0.5 MG/DL (ref 0–1)
BILIRUB SERPL-MCNC: 0.7 MG/DL (ref 0–1.2)
BUN SERPL-MCNC: 19 MG/DL (ref 6–23)
CALCIUM SERPL-MCNC: 8.3 MG/DL (ref 8.6–10.2)
CHLORIDE SERPL-SCNC: 102 MMOL/L (ref 98–107)
CO2 SERPL-SCNC: 23 MMOL/L (ref 22–29)
CREAT SERPL-MCNC: 1.2 MG/DL (ref 0.5–1)
EOSINOPHIL # BLD: 0.34 K/UL (ref 0.05–0.5)
EOSINOPHILS RELATIVE PERCENT: 3 % (ref 0–6)
ERYTHROCYTE [DISTWIDTH] IN BLOOD BY AUTOMATED COUNT: 13.5 % (ref 11.5–15)
GFR SERPL CREATININE-BSD FRML MDRD: 51 ML/MIN/1.73M2
GLUCOSE BLD-MCNC: 130 MG/DL (ref 74–99)
GLUCOSE BLD-MCNC: 138 MG/DL (ref 74–99)
GLUCOSE BLD-MCNC: 154 MG/DL (ref 74–99)
GLUCOSE BLD-MCNC: 173 MG/DL (ref 74–99)
GLUCOSE SERPL-MCNC: 126 MG/DL (ref 74–99)
HCT VFR BLD AUTO: 30.1 % (ref 34–48)
HGB BLD-MCNC: 9.6 G/DL (ref 11.5–15.5)
LYMPHOCYTES NFR BLD: 0.34 K/UL (ref 1.5–4)
LYMPHOCYTES RELATIVE PERCENT: 3 % (ref 20–42)
MAGNESIUM SERPL-MCNC: 1.5 MG/DL (ref 1.6–2.6)
MCH RBC QN AUTO: 29.1 PG (ref 26–35)
MCHC RBC AUTO-ENTMCNC: 31.9 G/DL (ref 32–34.5)
MCV RBC AUTO: 91.2 FL (ref 80–99.9)
MICROORGANISM SPEC CULT: NORMAL
MICROORGANISM SPEC CULT: NORMAL
MONOCYTES NFR BLD: 0.23 K/UL (ref 0.1–0.95)
MONOCYTES NFR BLD: 2 % (ref 2–12)
NEUTROPHILS NFR BLD: 93 % (ref 43–80)
NEUTS SEG NFR BLD: 11.78 K/UL (ref 1.8–7.3)
PHOSPHATE SERPL-MCNC: 2.7 MG/DL (ref 2.5–4.5)
PLATELET # BLD AUTO: 172 K/UL (ref 130–450)
PMV BLD AUTO: 11.2 FL (ref 7–12)
POTASSIUM SERPL-SCNC: 3.3 MMOL/L (ref 3.5–5)
PROT SERPL-MCNC: 6.4 G/DL (ref 6.4–8.3)
RBC # BLD AUTO: 3.3 M/UL (ref 3.5–5.5)
RBC # BLD: NORMAL 10*6/UL
SERVICE CMNT-IMP: NORMAL
SERVICE CMNT-IMP: NORMAL
SODIUM SERPL-SCNC: 135 MMOL/L (ref 132–146)
SPECIMEN DESCRIPTION: NORMAL
SPECIMEN DESCRIPTION: NORMAL
WBC OTHER # BLD: 12.7 K/UL (ref 4.5–11.5)

## 2024-03-16 PROCEDURE — 82962 GLUCOSE BLOOD TEST: CPT

## 2024-03-16 PROCEDURE — 6370000000 HC RX 637 (ALT 250 FOR IP): Performed by: INTERNAL MEDICINE

## 2024-03-16 PROCEDURE — 99232 SBSQ HOSP IP/OBS MODERATE 35: CPT | Performed by: INTERNAL MEDICINE

## 2024-03-16 PROCEDURE — 6370000000 HC RX 637 (ALT 250 FOR IP): Performed by: GENERAL PRACTICE

## 2024-03-16 PROCEDURE — 83735 ASSAY OF MAGNESIUM: CPT

## 2024-03-16 PROCEDURE — 82248 BILIRUBIN DIRECT: CPT

## 2024-03-16 PROCEDURE — 1200000000 HC SEMI PRIVATE

## 2024-03-16 PROCEDURE — 80053 COMPREHEN METABOLIC PANEL: CPT

## 2024-03-16 PROCEDURE — 84100 ASSAY OF PHOSPHORUS: CPT

## 2024-03-16 PROCEDURE — 85025 COMPLETE CBC W/AUTO DIFF WBC: CPT

## 2024-03-16 PROCEDURE — 2580000003 HC RX 258: Performed by: UROLOGY

## 2024-03-16 PROCEDURE — 6370000000 HC RX 637 (ALT 250 FOR IP): Performed by: REGISTERED NURSE

## 2024-03-16 RX ORDER — COLCHICINE 0.6 MG/1
0.6 TABLET ORAL DAILY
Status: DISCONTINUED | OUTPATIENT
Start: 2024-03-16 | End: 2024-03-18 | Stop reason: HOSPADM

## 2024-03-16 RX ORDER — CEFDINIR 300 MG/1
300 CAPSULE ORAL EVERY 12 HOURS SCHEDULED
Qty: 6 CAPSULE | Refills: 0 | Status: SHIPPED | OUTPATIENT
Start: 2024-03-16 | End: 2024-03-19

## 2024-03-16 RX ADMIN — HYDROCODONE BITARTRATE AND ACETAMINOPHEN 1 TABLET: 5; 325 TABLET ORAL at 08:17

## 2024-03-16 RX ADMIN — CEFDINIR 300 MG: 300 CAPSULE ORAL at 21:39

## 2024-03-16 RX ADMIN — SODIUM CHLORIDE, PRESERVATIVE FREE 10 ML: 5 INJECTION INTRAVENOUS at 21:40

## 2024-03-16 RX ADMIN — INSULIN GLARGINE 11 UNITS: 100 INJECTION, SOLUTION SUBCUTANEOUS at 21:39

## 2024-03-16 RX ADMIN — PANTOPRAZOLE SODIUM 40 MG: 40 TABLET, DELAYED RELEASE ORAL at 06:33

## 2024-03-16 RX ADMIN — INSULIN LISPRO 2 UNITS: 100 INJECTION, SOLUTION INTRAVENOUS; SUBCUTANEOUS at 08:15

## 2024-03-16 RX ADMIN — INSULIN LISPRO 1 UNITS: 100 INJECTION, SOLUTION INTRAVENOUS; SUBCUTANEOUS at 08:16

## 2024-03-16 RX ADMIN — INSULIN LISPRO 2 UNITS: 100 INJECTION, SOLUTION INTRAVENOUS; SUBCUTANEOUS at 13:02

## 2024-03-16 RX ADMIN — CEFDINIR 300 MG: 300 CAPSULE ORAL at 08:17

## 2024-03-16 RX ADMIN — HYDROCODONE BITARTRATE AND ACETAMINOPHEN 1 TABLET: 5; 325 TABLET ORAL at 17:23

## 2024-03-16 RX ADMIN — POTASSIUM BICARBONATE 40 MEQ: 782 TABLET, EFFERVESCENT ORAL at 10:49

## 2024-03-16 RX ADMIN — INSULIN LISPRO 2 UNITS: 100 INJECTION, SOLUTION INTRAVENOUS; SUBCUTANEOUS at 17:12

## 2024-03-16 RX ADMIN — COLCHICINE 0.6 MG: 0.6 TABLET ORAL at 10:48

## 2024-03-16 RX ADMIN — Medication 400 MG: at 08:17

## 2024-03-16 ASSESSMENT — PAIN SCALES - GENERAL
PAINLEVEL_OUTOF10: 6
PAINLEVEL_OUTOF10: 8
PAINLEVEL_OUTOF10: 4

## 2024-03-16 ASSESSMENT — PAIN DESCRIPTION - DESCRIPTORS
DESCRIPTORS: ACHING;DISCOMFORT
DESCRIPTORS: ACHING;DISCOMFORT
DESCRIPTORS_2: ACHING;DISCOMFORT;SHARP

## 2024-03-16 ASSESSMENT — PAIN DESCRIPTION - LOCATION
LOCATION: BACK
LOCATION: BACK
LOCATION_2: FOOT

## 2024-03-16 ASSESSMENT — PAIN DESCRIPTION - INTENSITY: RATING_2: 8

## 2024-03-16 ASSESSMENT — PAIN DESCRIPTION - FREQUENCY: FREQUENCY: INTERMITTENT

## 2024-03-16 ASSESSMENT — PAIN DESCRIPTION - ORIENTATION
ORIENTATION: LOWER;LEFT
ORIENTATION: LOWER
ORIENTATION_2: LEFT

## 2024-03-16 ASSESSMENT — PAIN - FUNCTIONAL ASSESSMENT: PAIN_FUNCTIONAL_ASSESSMENT: PREVENTS OR INTERFERES SOME ACTIVE ACTIVITIES AND ADLS

## 2024-03-16 ASSESSMENT — PAIN DESCRIPTION - ONSET: ONSET: ON-GOING

## 2024-03-16 ASSESSMENT — PAIN DESCRIPTION - PAIN TYPE: TYPE: ACUTE PAIN

## 2024-03-16 NOTE — PROGRESS NOTES
Patient seen feels better. Hesitant to go home . Not sure how to take insulin. Will get diabetic instructions.

## 2024-03-16 NOTE — PLAN OF CARE
Problem: Discharge Planning  Goal: Discharge to home or other facility with appropriate resources  3/15/2024 2308 by Angelica Perez RN  Outcome: Progressing  3/15/2024 1451 by Sarah James RN  Outcome: Progressing     Problem: Pain  Goal: Verbalizes/displays adequate comfort level or baseline comfort level  3/15/2024 2308 by Angelica Perez RN  Outcome: Progressing  3/15/2024 1451 by Sraah James RN  Outcome: Progressing     Problem: ABCDS Injury Assessment  Goal: Absence of physical injury  3/15/2024 2308 by Angelica Perez RN  Outcome: Progressing  3/15/2024 1451 by Sarah James RN  Outcome: Progressing     Problem: Safety - Adult  Goal: Free from fall injury  3/15/2024 2308 by Angelica Perez RN  Outcome: Progressing  3/15/2024 1451 by Sarah James RN  Outcome: Progressing     Problem: Chronic Conditions and Co-morbidities  Goal: Patient's chronic conditions and co-morbidity symptoms are monitored and maintained or improved  3/15/2024 2308 by Angelica Perez RN  Outcome: Progressing  3/15/2024 1451 by Sarah James RN  Outcome: Progressing     Problem: Nutrition Deficit:  Goal: Optimize nutritional status  3/15/2024 2308 by Angelica Perez RN  Outcome: Progressing  3/15/2024 1451 by Sarah James RN  Outcome: Progressing     Problem: Skin/Tissue Integrity  Goal: Absence of new skin breakdown  Description: 1.  Monitor for areas of redness and/or skin breakdown  2.  Assess vascular access sites hourly  3.  Every 4-6 hours minimum:  Change oxygen saturation probe site  4.  Every 4-6 hours:  If on nasal continuous positive airway pressure, respiratory therapy assess nares and determine need for appliance change or resting period.  Outcome: Progressing

## 2024-03-16 NOTE — PROGRESS NOTES
P Quality Flow/Interdisciplinary Rounds Progress Note        Quality Flow Rounds held on March 16, 2024    Disciplines Attending:  Bedside Nurse, , and Nursing Unit Leadership    Sravanthi Lr was admitted on 3/10/2024  6:51 PM    Anticipated Discharge Date:  03/17/2024    Disposition: Home     Job Score:  Job Scale Score: 19    Readmission Risk              Risk of Unplanned Readmission:  11           Discussed patient goal for the day, patient clinical progression, and barriers to discharge.  The following Goal(s) of the Day/Commitment(s) have been identified:  Discharge planning       Sarah James RN  March 16, 2024

## 2024-03-16 NOTE — PROGRESS NOTES
ENDOCRINOLOGY PROGRESS NOTE      Date of admission: 3/10/2024  Date of service: 3/16/2024  Admitting physician: Pascual Mcfarland DO   Primary Care Physician: Pascual Mcfarland DO  Consultant physician: Bo Vazquez MD     Reason for the consultation:  Uncontrolled DM    History of Present Illness:  The history is provided by the patient. Accuracy of the patient data is excellent    Sravanthi Lr is a very pleasant 68 y.o. old female with PMH of T2DM, HTN and other listed below admitted to [facility] on 3/10/2024 because of N/V and flank pain and found to be in DKA and has an obstructive kidney stone, endocrine service was consulted for diabetes management.    Subjective  The patient was seen earlier this morning.  No acute events.  Glucose level improving.  No hypoglycemia    Inpatient diet:   Carb Restricted diet     Point of care glucose monitoring   (Independently reviewed)   Recent Labs     03/14/24  2037 03/15/24  0649 03/15/24  1217 03/15/24  1356 03/15/24  1647 03/15/24  2054 03/16/24  0630 03/16/24  1046   POCGLU 224* 203* 137* 155* 184* 173* 154* 130*   \  Scheduled Meds:   colchicine  0.6 mg Oral Daily    magnesium oxide  400 mg Oral Daily with breakfast    insulin lispro  2 Units SubCUTAneous TID WC    insulin glargine  11 Units SubCUTAneous Nightly    cefdinir  300 mg Oral 2 times per day    pantoprazole  40 mg Oral QAM AC    insulin lispro  0-6 Units SubCUTAneous TID WC    sodium chloride flush  5-40 mL IntraVENous 2 times per day    insulin lispro  0-4 Units SubCUTAneous Nightly       PRN Meds:   Glucose, 15 g, PRN  HYDROcodone 5 mg - acetaminophen, 1 tablet, Q4H PRN  dextrose bolus, 125 mL, PRN   Or  dextrose bolus, 250 mL, PRN  glucagon (rDNA), 1 mg, PRN  dextrose, , Continuous PRN  sodium chloride flush, 5-40 mL, PRN  sodium chloride, , PRN  ondansetron, 4 mg, Q8H PRN   Or  ondansetron, 4 mg, Q6H PRN  polyethylene glycol, 17 g, Daily PRN  acetaminophen, 650 mg, Q6H PRN   Or  acetaminophen, 650 mg, Q6H  diabetes management    Diabetes Mellitus type 2 admitted with DKA  Patient's diabetes is uncontrolled, A1c 9.1%   We recommend the following subcu insulin regimen  Lantus 10 units daily  Humalog 2 units 3 times daily with meals   low-dose sliding scale ACHS  Continue glucose check with meals and at bedtime  Will titrate insulin dose based on the blood glucose trend & insulin requirement  Upon discharge, I will arrange for the patient to be seen in the endocrinology clinic for routine diabetes maintenance and prevention    Dietary noncompliance  Discussed with patient the importance of eating consistent carbohydrate meals, avoiding high glycemic index food. Also, discussed with patient the risk and negative consequences of dietary noncompliance on blood glucose control, blood pressure and weight      Interdisciplinary plan for communication with healthcare providers:   Consult recommendations were discussed with the Primary Service/Nursing staff      The above issues were reviewed with the patient who understood and agreed with the plan.    Thank you for allowing us to participate in the care of this patient. Please do not hesitate to contact us with any additional questions.     Bo Vazquez MD  Endocrinologist, [facility]   [address] [place] [address]   Phone: [phone]  Fax: [phone]  --------------------------------------------  An electronic signature was used to authenticate this note. Bo Vazquez MD on 3/16/2024 at 12:58 PM

## 2024-03-16 NOTE — PROGRESS NOTES
Attempted some teaching with patient regarding drawing up insulin and injecting into skin. She was not receptive to learning. Became very frustrated. Stated she will be going home with a pen and wants to learn with that and refused to inject herself at this time.

## 2024-03-16 NOTE — PROGRESS NOTES
[facility]  [facility]  Progress Note    SUBJECTIVE:  Chief Complaint   Patient presents with    Abdominal Pain    Emesis    Diarrhea     Recently changed diabetic medication    Hyperglycemia     Patient is sitting up in bed getting washed up   Says she is ok, but still having back pain   No fevers   Spoke with nursing     Review of systems:  As stated above in the chief complaint, otherwise negative.    Medications:  Scheduled Meds:   colchicine  0.6 mg Oral Daily    potassium bicarb-citric acid  40 mEq Oral Once    magnesium oxide  400 mg Oral Daily with breakfast    insulin lispro  2 Units SubCUTAneous TID WC    insulin glargine  11 Units SubCUTAneous Nightly    cefdinir  300 mg Oral 2 times per day    pantoprazole  40 mg Oral QAM AC    insulin lispro  0-6 Units SubCUTAneous TID WC    sodium chloride flush  5-40 mL IntraVENous 2 times per day    insulin lispro  0-4 Units SubCUTAneous Nightly     Continuous Infusions:   dextrose      sodium chloride       PRN Meds:Glucose, HYDROcodone 5 mg - acetaminophen, dextrose bolus **OR** dextrose bolus, glucagon (rDNA), dextrose, sodium chloride flush, sodium chloride, ondansetron **OR** ondansetron, polyethylene glycol, acetaminophen **OR** acetaminophen, HYDROmorphone    OBJECTIVE:  BP (!) 146/64   Pulse 77   Temp 98.2 °F (36.8 °C) (Oral)   Resp 16   Ht 1.549 m (5' 1\")   Wt 73.5 kg (162 lb 0.6 oz)   SpO2 97%   BMI 30.62 kg/m²   Temp  Av.3 °F (36.8 °C)  Min: 98.1 °F (36.7 °C)  Max: 98.6 °F (37 °C)  Constitutional: The patient is awake, alert, and oriented. Sitting up in bed, getting washed up   Skin: Warm and dry. No rashes were noted.   HEENT: Round and reactive pupils.  Moist mucous membranes.  No ulcerations or thrush.  Neck: Supple to movements.   Chest: No  respiratory distress. Symmetrical expansion.  No wheezing, crackles or rhonchi.  Cardiovascular: S1 and S2 are rhythmic and regular. No murmurs appreciated.   Abdomen:

## 2024-03-16 NOTE — PROGRESS NOTES
RN message out to Dr. Vazquez re: see if ok for d/c from endocrine POV; ok to d/c from POV and recommend current DM regimen

## 2024-03-16 NOTE — PLAN OF CARE
Problem: Discharge Planning  Goal: Discharge to home or other facility with appropriate resources  3/16/2024 1241 by Daina Davis RN  Outcome: Progressing     Problem: Pain  Goal: Verbalizes/displays adequate comfort level or baseline comfort level  3/16/2024 1241 by Daina Davis RN  Outcome: Progressing     Problem: ABCDS Injury Assessment  Goal: Absence of physical injury  3/16/2024 1241 by Daina Davis RN  Outcome: Progressing     Problem: Safety - Adult  Goal: Free from fall injury  3/16/2024 1241 by Daina Davis RN  Outcome: Progressing  Flowsheets (Taken 3/12/2024 0100 by Elizabeth Lipscomb RN)  Free From Fall Injury: Based on caregiver fall risk screen, instruct family/caregiver to ask for assistance with transferring infant if caregiver noted to have fall risk factors     Problem: Chronic Conditions and Co-morbidities  Goal: Patient's chronic conditions and co-morbidity symptoms are monitored and maintained or improved  3/16/2024 1241 by Daina Davis RN  Outcome: Progressing     Problem: Nutrition Deficit:  Goal: Optimize nutritional status  3/16/2024 1241 by Daina Davis RN  Outcome: Progressing     Problem: Skin/Tissue Integrity  Goal: Absence of new skin breakdown  Description: 1.  Monitor for areas of redness and/or skin breakdown  2.  Assess vascular access sites hourly  3.  Every 4-6 hours minimum:  Change oxygen saturation probe site  4.  Every 4-6 hours:  If on nasal continuous positive airway pressure, respiratory therapy assess nares and determine need for appliance change or resting period.  3/16/2024 1241 by Daina Davis, RN  Outcome: Progressing

## 2024-03-17 ENCOUNTER — APPOINTMENT (OUTPATIENT)
Dept: ULTRASOUND IMAGING | Age: 69
DRG: 853 | End: 2024-03-17
Payer: MEDICARE

## 2024-03-17 LAB
ALBUMIN SERPL-MCNC: 2.6 G/DL (ref 3.5–5.2)
ALP SERPL-CCNC: 85 U/L (ref 35–104)
ALT SERPL-CCNC: 9 U/L (ref 0–32)
ANION GAP SERPL CALCULATED.3IONS-SCNC: 10 MMOL/L (ref 7–16)
AST SERPL-CCNC: 14 U/L (ref 0–31)
BASOPHILS # BLD: 0.03 K/UL (ref 0–0.2)
BASOPHILS NFR BLD: 0 % (ref 0–2)
BILIRUB DIRECT SERPL-MCNC: <0.2 MG/DL (ref 0–0.3)
BILIRUB INDIRECT SERPL-MCNC: ABNORMAL MG/DL (ref 0–1)
BILIRUB SERPL-MCNC: 0.6 MG/DL (ref 0–1.2)
BUN SERPL-MCNC: 15 MG/DL (ref 6–23)
CALCIUM SERPL-MCNC: 8 MG/DL (ref 8.6–10.2)
CHLORIDE SERPL-SCNC: 103 MMOL/L (ref 98–107)
CO2 SERPL-SCNC: 22 MMOL/L (ref 22–29)
CREAT SERPL-MCNC: 1.1 MG/DL (ref 0.5–1)
EOSINOPHIL # BLD: 0.14 K/UL (ref 0.05–0.5)
EOSINOPHILS RELATIVE PERCENT: 1 % (ref 0–6)
ERYTHROCYTE [DISTWIDTH] IN BLOOD BY AUTOMATED COUNT: 13.3 % (ref 11.5–15)
GFR SERPL CREATININE-BSD FRML MDRD: 52 ML/MIN/1.73M2
GLUCOSE BLD-MCNC: 156 MG/DL (ref 74–99)
GLUCOSE BLD-MCNC: 172 MG/DL (ref 74–99)
GLUCOSE BLD-MCNC: 199 MG/DL (ref 74–99)
GLUCOSE BLD-MCNC: 210 MG/DL (ref 74–99)
GLUCOSE SERPL-MCNC: 153 MG/DL (ref 74–99)
HCT VFR BLD AUTO: 26.6 % (ref 34–48)
HGB BLD-MCNC: 8.6 G/DL (ref 11.5–15.5)
IMM GRANULOCYTES # BLD AUTO: 0.46 K/UL (ref 0–0.58)
IMM GRANULOCYTES NFR BLD: 5 % (ref 0–5)
LYMPHOCYTES NFR BLD: 0.76 K/UL (ref 1.5–4)
LYMPHOCYTES RELATIVE PERCENT: 8 % (ref 20–42)
MAGNESIUM SERPL-MCNC: 1.4 MG/DL (ref 1.6–2.6)
MCH RBC QN AUTO: 29.3 PG (ref 26–35)
MCHC RBC AUTO-ENTMCNC: 32.3 G/DL (ref 32–34.5)
MCV RBC AUTO: 90.5 FL (ref 80–99.9)
MONOCYTES NFR BLD: 0.68 K/UL (ref 0.1–0.95)
MONOCYTES NFR BLD: 7 % (ref 2–12)
NEUTROPHILS NFR BLD: 79 % (ref 43–80)
NEUTS SEG NFR BLD: 7.66 K/UL (ref 1.8–7.3)
PHOSPHATE SERPL-MCNC: 2.9 MG/DL (ref 2.5–4.5)
PLATELET # BLD AUTO: 201 K/UL (ref 130–450)
PMV BLD AUTO: 10.4 FL (ref 7–12)
POTASSIUM SERPL-SCNC: 3.5 MMOL/L (ref 3.5–5)
PROT SERPL-MCNC: 5.8 G/DL (ref 6.4–8.3)
RBC # BLD AUTO: 2.94 M/UL (ref 3.5–5.5)
SODIUM SERPL-SCNC: 135 MMOL/L (ref 132–146)
WBC OTHER # BLD: 9.7 K/UL (ref 4.5–11.5)

## 2024-03-17 PROCEDURE — 84100 ASSAY OF PHOSPHORUS: CPT

## 2024-03-17 PROCEDURE — 80053 COMPREHEN METABOLIC PANEL: CPT

## 2024-03-17 PROCEDURE — 82962 GLUCOSE BLOOD TEST: CPT

## 2024-03-17 PROCEDURE — 83735 ASSAY OF MAGNESIUM: CPT

## 2024-03-17 PROCEDURE — 2580000003 HC RX 258: Performed by: UROLOGY

## 2024-03-17 PROCEDURE — 1200000000 HC SEMI PRIVATE

## 2024-03-17 PROCEDURE — 82248 BILIRUBIN DIRECT: CPT

## 2024-03-17 PROCEDURE — 6370000000 HC RX 637 (ALT 250 FOR IP): Performed by: INTERNAL MEDICINE

## 2024-03-17 PROCEDURE — 93971 EXTREMITY STUDY: CPT

## 2024-03-17 PROCEDURE — 6370000000 HC RX 637 (ALT 250 FOR IP): Performed by: GENERAL PRACTICE

## 2024-03-17 PROCEDURE — 6370000000 HC RX 637 (ALT 250 FOR IP): Performed by: REGISTERED NURSE

## 2024-03-17 PROCEDURE — 36415 COLL VENOUS BLD VENIPUNCTURE: CPT

## 2024-03-17 PROCEDURE — 85025 COMPLETE CBC W/AUTO DIFF WBC: CPT

## 2024-03-17 RX ORDER — INSULIN GLARGINE 100 [IU]/ML
11 INJECTION, SOLUTION SUBCUTANEOUS NIGHTLY
Qty: 5 ADJUSTABLE DOSE PRE-FILLED PEN SYRINGE | Refills: 3 | Status: SHIPPED | OUTPATIENT
Start: 2024-03-17

## 2024-03-17 RX ORDER — INSULIN LISPRO 100 [IU]/ML
INJECTION, SOLUTION INTRAVENOUS; SUBCUTANEOUS
Qty: 3 ADJUSTABLE DOSE PRE-FILLED PEN SYRINGE | Refills: 4 | Status: SHIPPED | OUTPATIENT
Start: 2024-03-17

## 2024-03-17 RX ORDER — PEN NEEDLE, DIABETIC 32 GX 1/4"
NEEDLE, DISPOSABLE MISCELLANEOUS
Qty: 250 EACH | Refills: 5 | Status: SHIPPED | OUTPATIENT
Start: 2024-03-17

## 2024-03-17 RX ADMIN — SODIUM CHLORIDE, PRESERVATIVE FREE 10 ML: 5 INJECTION INTRAVENOUS at 09:08

## 2024-03-17 RX ADMIN — INSULIN LISPRO 1 UNITS: 100 INJECTION, SOLUTION INTRAVENOUS; SUBCUTANEOUS at 17:44

## 2024-03-17 RX ADMIN — PANTOPRAZOLE SODIUM 40 MG: 40 TABLET, DELAYED RELEASE ORAL at 06:36

## 2024-03-17 RX ADMIN — INSULIN LISPRO 1 UNITS: 100 INJECTION, SOLUTION INTRAVENOUS; SUBCUTANEOUS at 09:12

## 2024-03-17 RX ADMIN — CEFDINIR 300 MG: 300 CAPSULE ORAL at 20:35

## 2024-03-17 RX ADMIN — INSULIN LISPRO 2 UNITS: 100 INJECTION, SOLUTION INTRAVENOUS; SUBCUTANEOUS at 11:55

## 2024-03-17 RX ADMIN — SODIUM CHLORIDE, PRESERVATIVE FREE 10 ML: 5 INJECTION INTRAVENOUS at 20:35

## 2024-03-17 RX ADMIN — Medication 400 MG: at 09:08

## 2024-03-17 RX ADMIN — INSULIN LISPRO 2 UNITS: 100 INJECTION, SOLUTION INTRAVENOUS; SUBCUTANEOUS at 11:56

## 2024-03-17 RX ADMIN — INSULIN GLARGINE 11 UNITS: 100 INJECTION, SOLUTION SUBCUTANEOUS at 20:35

## 2024-03-17 RX ADMIN — INSULIN LISPRO 2 UNITS: 100 INJECTION, SOLUTION INTRAVENOUS; SUBCUTANEOUS at 09:13

## 2024-03-17 RX ADMIN — INSULIN LISPRO 2 UNITS: 100 INJECTION, SOLUTION INTRAVENOUS; SUBCUTANEOUS at 17:45

## 2024-03-17 RX ADMIN — CEFDINIR 300 MG: 300 CAPSULE ORAL at 09:08

## 2024-03-17 RX ADMIN — HYDROCODONE BITARTRATE AND ACETAMINOPHEN 1 TABLET: 5; 325 TABLET ORAL at 00:41

## 2024-03-17 RX ADMIN — COLCHICINE 0.6 MG: 0.6 TABLET ORAL at 09:08

## 2024-03-17 ASSESSMENT — PAIN DESCRIPTION - LOCATION
LOCATION: TOE (COMMENT WHICH ONE)
LOCATION: BACK;TOE (COMMENT WHICH ONE)

## 2024-03-17 ASSESSMENT — PAIN DESCRIPTION - ORIENTATION: ORIENTATION: RIGHT

## 2024-03-17 ASSESSMENT — PAIN - FUNCTIONAL ASSESSMENT: PAIN_FUNCTIONAL_ASSESSMENT: ACTIVITIES ARE NOT PREVENTED

## 2024-03-17 ASSESSMENT — PAIN DESCRIPTION - DESCRIPTORS: DESCRIPTORS: ACHING;SORE;THROBBING

## 2024-03-17 ASSESSMENT — PAIN SCALES - GENERAL
PAINLEVEL_OUTOF10: 9
PAINLEVEL_OUTOF10: 5

## 2024-03-17 NOTE — PROGRESS NOTES
ENDOCRINOLOGY PROGRESS NOTE      Date of admission: 3/10/2024  Date of service: 3/17/2024  Admitting physician: Pascual Mcfarland DO   Primary Care Physician: Pascual Mcfarland DO  Consultant physician: Bo Vazquez MD     Reason for the consultation:  Uncontrolled DM    History of Present Illness:  The history is provided by the patient. Accuracy of the patient data is excellent    Sravanthi rL is a very pleasant 68 y.o. old female with PMH of T2DM, HTN and other listed below admitted to [facility] on 3/10/2024 because of N/V and flank pain and found to be in DKA and has an obstructive kidney stone, endocrine service was consulted for diabetes management.    Subjective  The patient was seen this morning, no acute events, BS in range, likely for dc today     Inpatient diet:   Carb Restricted diet     Point of care glucose monitoring   (Independently reviewed)   Recent Labs     03/15/24  1356 03/15/24  1647 03/15/24  2054 03/16/24  0630 03/16/24  1046 03/16/24  1544 03/16/24  2047 03/17/24  0638   POCGLU 155* 184* 173* 154* 130* 138* 173* 156*   \  Scheduled Meds:   colchicine  0.6 mg Oral Daily    magnesium oxide  400 mg Oral Daily with breakfast    insulin lispro  2 Units SubCUTAneous TID WC    insulin glargine  11 Units SubCUTAneous Nightly    cefdinir  300 mg Oral 2 times per day    pantoprazole  40 mg Oral QAM AC    insulin lispro  0-6 Units SubCUTAneous TID WC    sodium chloride flush  5-40 mL IntraVENous 2 times per day    insulin lispro  0-4 Units SubCUTAneous Nightly       PRN Meds:   Glucose, 15 g, PRN  HYDROcodone 5 mg - acetaminophen, 1 tablet, Q4H PRN  dextrose bolus, 125 mL, PRN   Or  dextrose bolus, 250 mL, PRN  glucagon (rDNA), 1 mg, PRN  dextrose, , Continuous PRN  sodium chloride flush, 5-40 mL, PRN  sodium chloride, , PRN  ondansetron, 4 mg, Q8H PRN   Or  ondansetron, 4 mg, Q6H PRN  polyethylene glycol, 17 g, Daily PRN  acetaminophen, 650 mg, Q6H PRN   Or  acetaminophen, 650 mg, Q6H PRN  HYDROmorphone, 0.5

## 2024-03-17 NOTE — PLAN OF CARE
Problem: Discharge Planning  Goal: Discharge to home or other facility with appropriate resources  3/17/2024 0236 by Rosa Elena Tracy RN  Outcome: Progressing  3/16/2024 1241 by Daina Davis RN  Outcome: Progressing     Problem: Pain  Goal: Verbalizes/displays adequate comfort level or baseline comfort level  3/17/2024 0236 by Rosa Elena Tracy RN  Outcome: Progressing  3/16/2024 1241 by Daina Davis RN  Outcome: Progressing     Problem: ABCDS Injury Assessment  Goal: Absence of physical injury  3/17/2024 0236 by Rosa Elena Tracy RN  Outcome: Progressing  3/16/2024 1241 by Daina Davis RN  Outcome: Progressing     Problem: Safety - Adult  Goal: Free from fall injury  3/17/2024 0236 by Rosa Elena Tracy RN  Outcome: Progressing  3/16/2024 1241 by Daina Davis RN  Outcome: Progressing  Flowsheets (Taken 3/12/2024 0100 by Elizabeth Lipscomb RN)  Free From Fall Injury: Based on caregiver fall risk screen, instruct family/caregiver to ask for assistance with transferring infant if caregiver noted to have fall risk factors     Problem: Chronic Conditions and Co-morbidities  Goal: Patient's chronic conditions and co-morbidity symptoms are monitored and maintained or improved  3/17/2024 0236 by Rosa Elena Tracy RN  Outcome: Progressing  3/16/2024 1241 by Daina Davis RN  Outcome: Progressing     Problem: Nutrition Deficit:  Goal: Optimize nutritional status  3/17/2024 0236 by Rosa Elena Tracy RN  Outcome: Progressing  3/16/2024 1241 by Daina Davis RN  Outcome: Progressing     Problem: Skin/Tissue Integrity  Goal: Absence of new skin breakdown  Description: 1.  Monitor for areas of redness and/or skin breakdown  2.  Assess vascular access sites hourly  3.  Every 4-6 hours minimum:  Change oxygen saturation probe site  4.  Every 4-6 hours:  If on nasal continuous positive airway pressure, respiratory therapy assess nares and determine need for appliance change or resting period.  3/17/2024  0236 by Rosa Elena Tracy, RN  Outcome: Progressing  3/16/2024 1241 by Daina Davis RN  Outcome: Progressing

## 2024-03-17 NOTE — PLAN OF CARE
Problem: Discharge Planning  Goal: Discharge to home or other facility with appropriate resources  3/17/2024 1316 by Brenda Pérez RN  Outcome: Progressing  3/17/2024 0236 by Rosa Elena Tracy RN  Outcome: Progressing     Problem: Pain  Goal: Verbalizes/displays adequate comfort level or baseline comfort level  3/17/2024 1316 by Brenda Pérez RN  Outcome: Progressing  3/17/2024 0236 by Rosa Elena Tracy RN  Outcome: Progressing     Problem: ABCDS Injury Assessment  Goal: Absence of physical injury  3/17/2024 1316 by Brenda Pérez RN  Outcome: Progressing  3/17/2024 0236 by Rosa Elena Tracy RN  Outcome: Progressing

## 2024-03-18 VITALS
DIASTOLIC BLOOD PRESSURE: 73 MMHG | SYSTOLIC BLOOD PRESSURE: 153 MMHG | TEMPERATURE: 98.4 F | OXYGEN SATURATION: 98 % | WEIGHT: 162.04 LBS | HEIGHT: 61 IN | BODY MASS INDEX: 30.59 KG/M2 | HEART RATE: 70 BPM | RESPIRATION RATE: 16 BRPM

## 2024-03-18 LAB
GLUCOSE BLD-MCNC: 153 MG/DL (ref 74–99)
GLUCOSE BLD-MCNC: 213 MG/DL (ref 74–99)

## 2024-03-18 PROCEDURE — 6370000000 HC RX 637 (ALT 250 FOR IP): Performed by: GENERAL PRACTICE

## 2024-03-18 PROCEDURE — 2580000003 HC RX 258: Performed by: UROLOGY

## 2024-03-18 PROCEDURE — 82962 GLUCOSE BLOOD TEST: CPT

## 2024-03-18 PROCEDURE — 6370000000 HC RX 637 (ALT 250 FOR IP): Performed by: INTERNAL MEDICINE

## 2024-03-18 PROCEDURE — 6370000000 HC RX 637 (ALT 250 FOR IP): Performed by: REGISTERED NURSE

## 2024-03-18 RX ORDER — PANTOPRAZOLE SODIUM 40 MG/1
40 TABLET, DELAYED RELEASE ORAL
Qty: 30 TABLET | Refills: 3 | Status: SHIPPED | OUTPATIENT
Start: 2024-03-19

## 2024-03-18 RX ORDER — DIMETHICONE, OXYBENZONE, AND PADIMATE O 2; 2.5; 6.6 G/100G; G/100G; G/100G
STICK TOPICAL PRN
Status: DISCONTINUED | OUTPATIENT
Start: 2024-03-18 | End: 2024-03-18 | Stop reason: HOSPADM

## 2024-03-18 RX ORDER — HYDROCODONE BITARTRATE AND ACETAMINOPHEN 5; 325 MG/1; MG/1
1 TABLET ORAL EVERY 4 HOURS PRN
Qty: 28 TABLET | Refills: 0 | Status: SHIPPED | OUTPATIENT
Start: 2024-03-18 | End: 2024-03-25

## 2024-03-18 RX ORDER — ONDANSETRON 4 MG/1
4 TABLET, ORALLY DISINTEGRATING ORAL EVERY 8 HOURS PRN
Qty: 20 TABLET | Refills: 0 | Status: SHIPPED | OUTPATIENT
Start: 2024-03-18

## 2024-03-18 RX ADMIN — CEFDINIR 300 MG: 300 CAPSULE ORAL at 08:30

## 2024-03-18 RX ADMIN — Medication: at 00:15

## 2024-03-18 RX ADMIN — Medication 400 MG: at 08:30

## 2024-03-18 RX ADMIN — INSULIN LISPRO 2 UNITS: 100 INJECTION, SOLUTION INTRAVENOUS; SUBCUTANEOUS at 12:11

## 2024-03-18 RX ADMIN — COLCHICINE 0.6 MG: 0.6 TABLET ORAL at 08:30

## 2024-03-18 RX ADMIN — INSULIN LISPRO 1 UNITS: 100 INJECTION, SOLUTION INTRAVENOUS; SUBCUTANEOUS at 08:31

## 2024-03-18 RX ADMIN — PANTOPRAZOLE SODIUM 40 MG: 40 TABLET, DELAYED RELEASE ORAL at 06:08

## 2024-03-18 RX ADMIN — INSULIN LISPRO 2 UNITS: 100 INJECTION, SOLUTION INTRAVENOUS; SUBCUTANEOUS at 08:31

## 2024-03-18 RX ADMIN — SODIUM CHLORIDE, PRESERVATIVE FREE 10 ML: 5 INJECTION INTRAVENOUS at 08:32

## 2024-03-18 RX ADMIN — HYDROCODONE BITARTRATE AND ACETAMINOPHEN 1 TABLET: 5; 325 TABLET ORAL at 00:00

## 2024-03-18 ASSESSMENT — PAIN DESCRIPTION - ORIENTATION: ORIENTATION: INNER;MID

## 2024-03-18 ASSESSMENT — PAIN - FUNCTIONAL ASSESSMENT: PAIN_FUNCTIONAL_ASSESSMENT: ACTIVITIES ARE NOT PREVENTED

## 2024-03-18 ASSESSMENT — PAIN DESCRIPTION - FREQUENCY: FREQUENCY: INTERMITTENT

## 2024-03-18 ASSESSMENT — PAIN DESCRIPTION - DESCRIPTORS: DESCRIPTORS: ACHING;DISCOMFORT;SORE;TENDER

## 2024-03-18 ASSESSMENT — PAIN DESCRIPTION - ONSET: ONSET: GRADUAL

## 2024-03-18 ASSESSMENT — PAIN SCALES - GENERAL
PAINLEVEL_OUTOF10: 7
PAINLEVEL_OUTOF10: 6

## 2024-03-18 ASSESSMENT — PAIN DESCRIPTION - LOCATION: LOCATION: ABDOMEN

## 2024-03-18 ASSESSMENT — PAIN DESCRIPTION - PAIN TYPE: TYPE: ACUTE PAIN

## 2024-03-18 NOTE — PROGRESS NOTES
Reason for consult:  New to insulin     A1C: 9.1%             Patient states the following concerns/barriers to diabetes self-management:     [x] None       [] Medication cost   [] Food cost/availability        [] Reading  [] Hearing   [] Vision                [] Work    [] Transportation  [] No insurance  [] Physical limitations    [] Other:        Patient states the following about their diabetes/health:   [x] She was diagnosed with diabetes a long time ago.     [x] She has taken Victoza in the past and is familiar with pen use. She was taking glimepiride and metformin most recently.   [x] She eats three meals daily, but currently suffering from poor appetite.    [x] She monitors her glucose levels daily at home.     Diabetes survival packet provided to:   [x] Patient     [] Other:    Information given:   Definition of diabetes   Target glucose ranges/A1C   Self-monitoring of blood glucose   Prevention/symptoms/treatment of hypo-/hyperglycemia   Medication adherence             The plate method/meal planning guidelines             The benefits of exercise and recommendations             Reducing the risk of chronic complications     Diabetes medications reviewed (use, purpose, action): Education provided regarding current basal/bolus regimen of Lantus and Humalog including differences in types of insulin, timing with meals, onset, duration of effect and peak of insulin dose. Hypoglycemia signs, symptoms and treatments reviewed and literature provided. Patient instructed on the preparation and injection of insulin dose via pen method. Patient completed a return demonstration of the proper assembly of pen and injection technique using the injection pillow.  Patient verbalized understanding of site rotation, storage and proper sharps disposal. She has injected Victoza via pen in the past, so she is familiar with pen injections. She is agreeable to starting on basal/bolus insulin at home via pens.              Post-education Assessment  [x]  Attentive to teaching  [x]  Answered questions appropriately when asked   [x]  Seems able to apply concepts to daily lifestyle  [x]  Seems motivated to do well  [x]  Verbalized an understanding of plate method  [x]  Verbalized an understanding of prescribed antidiabetes medications   [x]  Verbalized an understanding of target glucose ranges/A1C level  [x]  Expresses an intent to comply with treatment plan   []  Showed very little interest in complying with treatment plan   []  Seems to have trouble applying concepts to daily lifestyle     COMMENTS: Patient pleasant and receptive to education. She plans to follow up with Dr. Vazquez as an outpatient. Encouraged outpatient diabetes education. She expresses interest in a CGM, but would like to hold off trying one for now until she knows if she will require insulin long-term.           Recommendations:   [x] Carbohydrate-controlled diet    [] Script for glucometer and supplies (per preference of patient's insurance)  [x] Script for insulin pens and pen needles (if insulin is ordered at discharge for home use)   [] Consult to social work: patient has no insurance or has financial hardship  [] Inpatient consult to endocrinologist   [x] Follow up with endocrinologist as an outpatient   [] Home healthcare nursing to increase compliance to treatment plan   [x] Script for outpatient diabetes education classes (from doctor)        Thank you for this consult.   Suellen Fox, RN, BSN, [facility]

## 2024-03-18 NOTE — PROGRESS NOTES
[facility] Quality Flow/Interdisciplinary Rounds Progress Note        Quality Flow Rounds held on March 18, 2024    Disciplines Attending:  Bedside Nurse, , , and Nursing Unit Leadership    Sravanthi Lr was admitted on 3/10/2024  6:51 PM    Anticipated Discharge Date:  Expected Discharge Date: 03/13/24    Disposition:    Job Score:  Job Scale Score: 20    Readmission Risk              Risk of Unplanned Readmission:  17           Discussed patient goal for the day, patient clinical progression, and barriers to discharge.  The following Goal(s) of the Day/Commitment(s) have been identified:   discharge planning - home today      Rashawn Suh, RN  March 18, 2024

## 2024-03-18 NOTE — PLAN OF CARE

## 2024-03-18 NOTE — PROGRESS NOTES
[facility]  [facility]  Progress Note    SUBJECTIVE:  Chief Complaint   Patient presents with    Abdominal Pain    Emesis    Diarrhea     Recently changed diabetic medication    Hyperglycemia     Patient is sitting up in bed  She reports that her flank and back pain is much improved  No fevers or chills  No nausea or diarrhea     Review of systems:  As stated above in the chief complaint, otherwise negative.    Medications:  Scheduled Meds:   colchicine  0.6 mg Oral Daily    magnesium oxide  400 mg Oral Daily with breakfast    insulin lispro  2 Units SubCUTAneous TID WC    insulin glargine  11 Units SubCUTAneous Nightly    cefdinir  300 mg Oral 2 times per day    pantoprazole  40 mg Oral QAM AC    insulin lispro  0-6 Units SubCUTAneous TID WC    sodium chloride flush  5-40 mL IntraVENous 2 times per day    insulin lispro  0-4 Units SubCUTAneous Nightly     Continuous Infusions:   dextrose      sodium chloride       PRN Meds:medicated lip balm, Glucose, HYDROcodone 5 mg - acetaminophen, dextrose bolus **OR** dextrose bolus, glucagon (rDNA), dextrose, sodium chloride flush, sodium chloride, ondansetron **OR** ondansetron, polyethylene glycol, acetaminophen **OR** acetaminophen, HYDROmorphone    OBJECTIVE:  BP (!) 153/73   Pulse 70   Temp 98.4 °F (36.9 °C) (Oral)   Resp 16   Ht 1.549 m (5' 1\")   Wt 73.5 kg (162 lb 0.6 oz)   SpO2 98%   BMI 30.62 kg/m²   Temp  Av.6 °F (37 °C)  Min: 98.1 °F (36.7 °C)  Max: 99 °F (37.2 °C)  Constitutional: The patient is awake, alert, and oriented. Sitting up in bed, in no distress  Skin: Warm and dry. No rashes were noted.   HEENT: Round and reactive pupils.  Moist mucous membranes.  No ulcerations or thrush.  Neck: Supple to movements.   Chest: No respiratory distress. Symmetrical expansion.  No wheezing, crackles or rhonchi.  Cardiovascular: S1 and S2 are rhythmic and regular. No murmurs appreciated.   Abdomen: Positive bowel sounds to

## 2024-03-18 NOTE — CARE COORDINATION
Updated plan of care. Plan for discharge today. Diabetic ed to see pt again before discharge. Scripts for pens completed by endocrinology. Pt still declining needs for home. Will follow-mjo

## 2024-03-18 NOTE — PLAN OF CARE
Problem: Discharge Planning  Goal: Discharge to home or other facility with appropriate resources  3/17/2024 2027 by Angelica Perez, RN  Outcome: Progressing     Problem: Pain  Goal: Verbalizes/displays adequate comfort level or baseline comfort level  3/17/2024 2027 by Angelica Perez, RN  Outcome: Progressing     Problem: ABCDS Injury Assessment  Goal: Absence of physical injury  3/17/2024 2027 by Angelica Perez, RN  Outcome: Progressing     Problem: Safety - Adult  Goal: Free from fall injury  Outcome: Progressing     Problem: Chronic Conditions and Co-morbidities  Goal: Patient's chronic conditions and co-morbidity symptoms are monitored and maintained or improved  Outcome: Progressing     Problem: Nutrition Deficit:  Goal: Optimize nutritional status  Outcome: Progressing     Problem: Skin/Tissue Integrity  Goal: Absence of new skin breakdown  Description: 1.  Monitor for areas of redness and/or skin breakdown  2.  Assess vascular access sites hourly  3.  Every 4-6 hours minimum:  Change oxygen saturation probe site  4.  Every 4-6 hours:  If on nasal continuous positive airway pressure, respiratory therapy assess nares and determine need for appliance change or resting period.  Outcome: Progressing

## 2024-03-24 NOTE — PROGRESS NOTES
Physician Progress Note      PATIENT:               YINKA REYES  CSN #:                  [id number]  :                       1955  ADMIT DATE:       3/10/2024 6:51 PM  DISCH DATE:        3/18/2024 1:39 PM  RESPONDING  PROVIDER #:        Javan Sher DO          QUERY TEXT:    Dear Dr Sher,    Patient admitted with Complicated UTI associated to obstructing ureteral   stone, Sepsis, DKA. Noted documentation of \" Shock...Suspect septic +   hypovolemic\" only per ICU 3/10-3/12. In order to support the diagnosis of   Shock, please include additional clinical indicators in your documentation.    Or please document if the diagnosis of Shock has been ruled out after further   study.    The medical record reflects the following:  Risk Factors: DKA and Obstructive Uropathy  Clinical Indicators: Per ICU 3/12,\" ... # Shock...Suspect septic +   hypovolemic...Lactate 5.0 > 5.3 > 1.9 > 1.6...1.0 -improved...s/p 3 L bolus...   KRANTHI In setting of shock, hypovolemia...\"  Per ID notes,\"...Hypotension   secondary to sepsis ...\"  Treatment: IV ATB, IVF, No Pressors administered    Thank you,  Monique Jimenes RN CCDS  Clinical Documentation Improvement Specialist  Phone# [phone]  Options provided:  -- Shock present as evidenced by:Shock is present as evidenced by, Please   document evidence.  -- Shock was ruled out. Only hypotension was present  -- Other - I will add my own diagnosis  -- Disagree - Not applicable / Not valid  -- Disagree - Clinically unable to determine / Unknown  -- Refer to Clinical Documentation Reviewer    PROVIDER RESPONSE TEXT:    Shock present as evidenced by:Shock is present as evidenced by severe sepsis   with elevated lactate of 5 indicating poor perfusion and shock    Query created by: Monique Jimenes on 3/21/2024 2:18 PM      Electronically signed by:  Javan Sher DO 3/24/2024 10:49 AM

## 2024-04-08 ENCOUNTER — HOSPITAL ENCOUNTER (OUTPATIENT)
Age: 69
Discharge: HOME | End: 2024-04-10

## 2024-04-08 PROCEDURE — 88300 SURGICAL PATH GROSS: CPT

## 2024-04-08 PROCEDURE — 82365 CALCULUS SPECTROSCOPY: CPT

## 2024-04-11 LAB — SURGICAL PATHOLOGY REPORT: NORMAL

## 2024-04-13 LAB
STONE COMPOSITION: NORMAL
STONE DESCRIPTION: NORMAL
STONE MASS: 240 MG

## 2024-04-16 ENCOUNTER — OFFICE VISIT (OUTPATIENT)
Dept: ENDOCRINOLOGY | Age: 69
End: 2024-04-16
Payer: MEDICARE

## 2024-04-16 VITALS
HEIGHT: 61 IN | WEIGHT: 143 LBS | SYSTOLIC BLOOD PRESSURE: 136 MMHG | HEART RATE: 76 BPM | DIASTOLIC BLOOD PRESSURE: 80 MMHG | BODY MASS INDEX: 27 KG/M2 | OXYGEN SATURATION: 97 % | RESPIRATION RATE: 18 BRPM

## 2024-04-16 DIAGNOSIS — N20.0 RENAL CALCULI: ICD-10-CM

## 2024-04-16 DIAGNOSIS — Z79.4 TYPE 2 DIABETES MELLITUS WITH HYPERGLYCEMIA, WITH LONG-TERM CURRENT USE OF INSULIN (HCC): Primary | ICD-10-CM

## 2024-04-16 DIAGNOSIS — E11.65 TYPE 2 DIABETES MELLITUS WITH HYPERGLYCEMIA, WITH LONG-TERM CURRENT USE OF INSULIN (HCC): Primary | ICD-10-CM

## 2024-04-16 PROCEDURE — 99214 OFFICE O/P EST MOD 30 MIN: CPT | Performed by: FAMILY MEDICINE

## 2024-04-16 PROCEDURE — 1124F ACP DISCUSS-NO DSCNMKR DOCD: CPT | Performed by: FAMILY MEDICINE

## 2024-04-16 PROCEDURE — G8400 PT W/DXA NO RESULTS DOC: HCPCS | Performed by: FAMILY MEDICINE

## 2024-04-16 PROCEDURE — 1111F DSCHRG MED/CURRENT MED MERGE: CPT | Performed by: FAMILY MEDICINE

## 2024-04-16 PROCEDURE — G8427 DOCREV CUR MEDS BY ELIG CLIN: HCPCS | Performed by: FAMILY MEDICINE

## 2024-04-16 PROCEDURE — G8417 CALC BMI ABV UP PARAM F/U: HCPCS | Performed by: FAMILY MEDICINE

## 2024-04-16 PROCEDURE — 3017F COLORECTAL CA SCREEN DOC REV: CPT | Performed by: FAMILY MEDICINE

## 2024-04-16 PROCEDURE — 2022F DILAT RTA XM EVC RTNOPTHY: CPT | Performed by: FAMILY MEDICINE

## 2024-04-16 PROCEDURE — 3046F HEMOGLOBIN A1C LEVEL >9.0%: CPT | Performed by: FAMILY MEDICINE

## 2024-04-16 PROCEDURE — 1036F TOBACCO NON-USER: CPT | Performed by: FAMILY MEDICINE

## 2024-04-16 PROCEDURE — 1090F PRES/ABSN URINE INCON ASSESS: CPT | Performed by: FAMILY MEDICINE

## 2024-04-16 NOTE — PATIENT INSTRUCTIONS
Lantus 13 units at bedtime  Humalog 4 units with meals plus low dose sliding scale  -200 add 1Units   -250 add 2 Units   -300 add 3 Units  -350 add 4 Units  -400 add 5 Units  BS over 400 add 7 Units      Blood sugar goals <140 before meals  Less than 180 2 hours after meals.

## 2024-04-16 NOTE — PROGRESS NOTES
insulin (HCC)        2. Dietary noncompliance            Plan:     1. Type 2 diabetes mellitus with hyperglycemia, with long-term current use of insulin (HCC)    2. Dietary noncompliance        Diabetes Mellitus Type   2  Patient's diabetes is uncontrolled.  Hemoglobin A1c 9.1%  Increase Lantus 13 units at bedtime  Increase Humalog 4 units with meals plus add low dose sliding scale  I have asked patient to go home and bring her insulin pens in so that we can ensure that she is using them correctly and make sure that she does not have defective pens.  Patient agrees.  Patient is still battling an active infection.  Goal would be to hopefully transition off of insulin, or at least mealtime insulin, once infection clears.  Close follow-up.  The patient was advised to check blood sugars 4 times a day before meals and at bedtime   Discussed with patient A1c and blood sugar goals   Optimal blood sugars: 100-140 pre-prandial, < 180 peak post-prandial  The patient counseled about the complications of uncontrolled diabetes   Patient was counselled about the importance of self-blood glucose monitoring and eating consistent carb diet to avoid blood sugar fluctuations   Patient will need routine diabetes maintenance and prevention  Return to office in 6 weeks.  Will check diabetes labs at next visit    Renal calculi  Status post lithotripsy 10 days ago.  Still has stent in place.  Still on antibiotics.  Follows with urology  Avoid SGLT2 inhibitors until calculi is resolved and infection clears.  No history of UTIs        I personally spent  30 minutes reviewing  external notes from PCP and other patient's care team providers, and personally interpreted labs associated with the above diagnosis. I also ordered labs to further assess and manage the above addressed medical conditions.        Return in about 6 weeks (around 5/28/2024) for Type 2 DM.    The above issues were reviewed with the patient who understood and agreed with

## 2024-05-30 ENCOUNTER — OFFICE VISIT (OUTPATIENT)
Dept: ENDOCRINOLOGY | Age: 69
End: 2024-05-30
Payer: MEDICARE

## 2024-05-30 VITALS
SYSTOLIC BLOOD PRESSURE: 129 MMHG | OXYGEN SATURATION: 98 % | HEIGHT: 61 IN | DIASTOLIC BLOOD PRESSURE: 80 MMHG | WEIGHT: 139.6 LBS | BODY MASS INDEX: 26.36 KG/M2 | RESPIRATION RATE: 18 BRPM | HEART RATE: 74 BPM

## 2024-05-30 DIAGNOSIS — E11.65 TYPE 2 DIABETES MELLITUS WITH HYPERGLYCEMIA, WITH LONG-TERM CURRENT USE OF INSULIN (HCC): Primary | ICD-10-CM

## 2024-05-30 DIAGNOSIS — N20.0 RENAL CALCULI: ICD-10-CM

## 2024-05-30 DIAGNOSIS — Z79.4 TYPE 2 DIABETES MELLITUS WITH HYPERGLYCEMIA, WITH LONG-TERM CURRENT USE OF INSULIN (HCC): Primary | ICD-10-CM

## 2024-05-30 PROCEDURE — 3017F COLORECTAL CA SCREEN DOC REV: CPT | Performed by: FAMILY MEDICINE

## 2024-05-30 PROCEDURE — 2022F DILAT RTA XM EVC RTNOPTHY: CPT | Performed by: FAMILY MEDICINE

## 2024-05-30 PROCEDURE — 3046F HEMOGLOBIN A1C LEVEL >9.0%: CPT | Performed by: FAMILY MEDICINE

## 2024-05-30 PROCEDURE — G8427 DOCREV CUR MEDS BY ELIG CLIN: HCPCS | Performed by: FAMILY MEDICINE

## 2024-05-30 PROCEDURE — G8400 PT W/DXA NO RESULTS DOC: HCPCS | Performed by: FAMILY MEDICINE

## 2024-05-30 PROCEDURE — G8417 CALC BMI ABV UP PARAM F/U: HCPCS | Performed by: FAMILY MEDICINE

## 2024-05-30 PROCEDURE — 1090F PRES/ABSN URINE INCON ASSESS: CPT | Performed by: FAMILY MEDICINE

## 2024-05-30 PROCEDURE — 1124F ACP DISCUSS-NO DSCNMKR DOCD: CPT | Performed by: FAMILY MEDICINE

## 2024-05-30 PROCEDURE — 1036F TOBACCO NON-USER: CPT | Performed by: FAMILY MEDICINE

## 2024-05-30 PROCEDURE — 99204 OFFICE O/P NEW MOD 45 MIN: CPT | Performed by: FAMILY MEDICINE

## 2024-05-30 NOTE — PATIENT INSTRUCTIONS
Decrease lantus 10 units nightly    Start jardiance 10 mg daily    Stop humalog    Send blood sugar log in 2 weeks for further instructions

## 2024-05-30 NOTE — PROGRESS NOTES
[facility] [place] [facility] Department of Endocrinology Diabetes and Metabolism   [address]  [place] [address]  Phone: [phone]  Fax: [phone]    Date of Service: 5/30/2024    Primary Care Physician: Pascual Mcfarland DO  Referring physician: No ref. provider found  Provider: JENNI Yap - CNP     Reason for the visit:  Type 2 diabetes    History of Present Illness:  The history is provided by the patient. No  was used. Accuracy of the patient data is excellent.  Sravanthi Lr is a very pleasant 68 y.o. female seen today for diabetes management     Admitted to the hospital on 3/10/2024 due to obstructive kidney stone and was found to be in DKA.    S/p lithotripsy in March 2024. Had temporary stent that has since been removed. Infection clears. No further calculi and discharged from Urology.  Denies history of frequent urinary tract infections prior to this kidney stone.    Prior to admission patient was taking metformin 500 mg twice daily, Januvia 50 mg daily, and glimepiride 2 mg daily.    Did not tolerate metformin- diarrhea    Sravanthi Lr was diagnosed with diabetes several years ago and currently on Lantus 13 units nightly and Humalog 4 units with meals.     The patient has been checking blood sugar 3x per day and as needed  Glucometer reviewed.  Blood sugars at goal      Most recent A1c results summarized below  Lab Results   Component Value Date/Time    LABA1C 9.1 03/11/2024 06:45 AM    LABA1C 9.1 03/11/2024 05:40 AM    LABA1C 4.7 10/24/2022 12:00 PM     Patient has had no hypoglycemic episodes   The patient has been mindful of what has been eating and wasn't following diabetes diet    I reviewed current medications and the patient has no issues with diabetes medications   The patient is due for an eye exam.  The patient  performs  own feet care. Podiatry as needed  Microvascular complications:  No Retinopathy, Nephropathy + Neuropathy

## 2024-06-19 ENCOUNTER — TELEPHONE (OUTPATIENT)
Dept: ENDOCRINOLOGY | Age: 69
End: 2024-06-19

## 2024-06-19 NOTE — TELEPHONE ENCOUNTER
Patient's diabetes control has improved significantly since hospitalization.  Decrease Lantus 10 units at bedtime  Start Jardiance 10mg daily.  Advised patient to increase water intake.  Stop Humalog   Check blood sugars 2-4 times daily and send blood sugar log in 2 weeks  Will likely stop Lantus soon and try to optimize oral antidiabetic agents    Bs log attached

## 2024-06-19 NOTE — TELEPHONE ENCOUNTER
Lantus for now.  Increase Jardiance to 25 mg daily.  Send blood sugar log in 2 weeks to determine if we need to resume Lantus

## 2024-06-28 ENCOUNTER — TELEPHONE (OUTPATIENT)
Dept: ENDOCRINOLOGY | Age: 69
End: 2024-06-28

## 2024-06-28 NOTE — TELEPHONE ENCOUNTER
Pt out of Lantus due to misuse the fisrt week she got out of the hospital and she can't get it filled due to being 28 days early. Please advise.

## 2024-07-26 ENCOUNTER — TELEPHONE (OUTPATIENT)
Dept: ENDOCRINOLOGY | Age: 69
End: 2024-07-26

## 2024-07-26 DIAGNOSIS — E11.65 TYPE 2 DIABETES MELLITUS WITH HYPERGLYCEMIA, WITH LONG-TERM CURRENT USE OF INSULIN (HCC): Primary | ICD-10-CM

## 2024-07-26 DIAGNOSIS — Z79.4 TYPE 2 DIABETES MELLITUS WITH HYPERGLYCEMIA, WITH LONG-TERM CURRENT USE OF INSULIN (HCC): Primary | ICD-10-CM

## 2024-07-28 RX ORDER — TIRZEPATIDE 2.5 MG/.5ML
2.5 INJECTION, SOLUTION SUBCUTANEOUS WEEKLY
Qty: 6 ML | Refills: 3 | Status: SHIPPED | OUTPATIENT
Start: 2024-07-28

## 2024-09-30 ENCOUNTER — OFFICE VISIT (OUTPATIENT)
Dept: ENDOCRINOLOGY | Age: 69
End: 2024-09-30
Payer: MEDICARE

## 2024-09-30 VITALS
SYSTOLIC BLOOD PRESSURE: 117 MMHG | RESPIRATION RATE: 18 BRPM | HEIGHT: 61 IN | HEART RATE: 72 BPM | WEIGHT: 136.8 LBS | OXYGEN SATURATION: 99 % | BODY MASS INDEX: 25.83 KG/M2 | DIASTOLIC BLOOD PRESSURE: 76 MMHG

## 2024-09-30 DIAGNOSIS — E55.9 VITAMIN D DEFICIENCY: ICD-10-CM

## 2024-09-30 DIAGNOSIS — E11.42 TYPE 2 DIABETES MELLITUS WITH DIABETIC POLYNEUROPATHY, WITHOUT LONG-TERM CURRENT USE OF INSULIN (HCC): ICD-10-CM

## 2024-09-30 DIAGNOSIS — E11.65 TYPE 2 DIABETES MELLITUS WITH HYPERGLYCEMIA, WITHOUT LONG-TERM CURRENT USE OF INSULIN (HCC): Primary | ICD-10-CM

## 2024-09-30 LAB — HBA1C MFR BLD: 6.3 %

## 2024-09-30 PROCEDURE — 1124F ACP DISCUSS-NO DSCNMKR DOCD: CPT | Performed by: FAMILY MEDICINE

## 2024-09-30 PROCEDURE — G8417 CALC BMI ABV UP PARAM F/U: HCPCS | Performed by: FAMILY MEDICINE

## 2024-09-30 PROCEDURE — G8427 DOCREV CUR MEDS BY ELIG CLIN: HCPCS | Performed by: FAMILY MEDICINE

## 2024-09-30 PROCEDURE — 2022F DILAT RTA XM EVC RTNOPTHY: CPT | Performed by: FAMILY MEDICINE

## 2024-09-30 PROCEDURE — 3044F HG A1C LEVEL LT 7.0%: CPT | Performed by: FAMILY MEDICINE

## 2024-09-30 PROCEDURE — 83036 HEMOGLOBIN GLYCOSYLATED A1C: CPT | Performed by: FAMILY MEDICINE

## 2024-09-30 PROCEDURE — 99214 OFFICE O/P EST MOD 30 MIN: CPT | Performed by: FAMILY MEDICINE

## 2024-09-30 PROCEDURE — 3017F COLORECTAL CA SCREEN DOC REV: CPT | Performed by: FAMILY MEDICINE

## 2024-09-30 PROCEDURE — 1036F TOBACCO NON-USER: CPT | Performed by: FAMILY MEDICINE

## 2024-09-30 PROCEDURE — G8400 PT W/DXA NO RESULTS DOC: HCPCS | Performed by: FAMILY MEDICINE

## 2024-09-30 PROCEDURE — 1090F PRES/ABSN URINE INCON ASSESS: CPT | Performed by: FAMILY MEDICINE

## 2024-09-30 RX ORDER — TIRZEPATIDE 5 MG/.5ML
5 INJECTION, SOLUTION SUBCUTANEOUS WEEKLY
Qty: 12 ADJUSTABLE DOSE PRE-FILLED PEN SYRINGE | Refills: 3 | Status: SHIPPED | OUTPATIENT
Start: 2024-09-30

## 2024-09-30 NOTE — PROGRESS NOTES
current facility-administered medications for this visit.       Review of Systems  Constitutional: No fever, no chills, no diaphoresis, no generalized weakness.  HEENT: No blurred vision, No sore throat, no ear pain, no hair loss  Neck: denied any neck swelling, difficulty swallowing,   Cardio-pulmonary: No CP, SOB or palpitation, No orthopnea or PND. No cough or wheezing.  GI: No N/V/D, no constipation, No abdominal pain, no melena or hematochezia   : Denied any dysuria, hematuria, flank pain, discharge, or incontinence.   Skin: denied any rash, ulcer, Hirsute, or hyperpigmentation.   MSK: denied any joint deformity, joint pain/swelling, muscle pain, or back pain.  Neuro: no numbness, no tingling, no weakness, _    OBJECTIVE    /76   Pulse 72   Resp 18   Ht 1.549 m (5' 1\")   Wt 62.1 kg (136 lb 12.8 oz)   SpO2 99%   BMI 25.85 kg/m²   BP Readings from Last 4 Encounters:   09/30/24 117/76   05/30/24 129/80   04/16/24 136/80   03/18/24 (!) 153/73     Wt Readings from Last 6 Encounters:   09/30/24 62.1 kg (136 lb 12.8 oz)   05/30/24 63.3 kg (139 lb 9.6 oz)   04/16/24 64.9 kg (143 lb)   03/11/24 73.5 kg (162 lb 0.6 oz)   06/23/23 64 kg (141 lb)   05/28/23 66.7 kg (147 lb)       Physical examination:  General: awake alert, oriented x3, no abnormal position or movements.  HEENT: normocephalic non-traumatic, no exophthalmos   Neck: supple, no LN enlargement, no thyromegaly, no thyroid tenderness, no JVD.  Pulm: Clear equal air entry no added sounds, no wheezing or rhonchi    CVS: S1 + S2, no murmur, no heave. Dorsalis pedis pulse palpable   Abd: soft lax, no tenderness, no organomegaly, audible bowel sounds.   Skin: warm, no lesions, no rash. No callus, no Ulcers, No acanthosis nigricans  Musculoskeletal: No back tenderness, no kyphosis/scoliosis    Neuro: CN intact,  sensation decreased bilateral , muscle power normal  Psych: normal mood, and affect      Review of Laboratory Data:  I personally reviewed the

## 2025-03-26 ENCOUNTER — OFFICE VISIT (OUTPATIENT)
Dept: ENDOCRINOLOGY | Age: 70
End: 2025-03-26
Payer: MEDICARE

## 2025-03-26 VITALS
HEIGHT: 61 IN | WEIGHT: 140 LBS | RESPIRATION RATE: 18 BRPM | TEMPERATURE: 97.6 F | OXYGEN SATURATION: 100 % | DIASTOLIC BLOOD PRESSURE: 80 MMHG | SYSTOLIC BLOOD PRESSURE: 166 MMHG | HEART RATE: 80 BPM | BODY MASS INDEX: 26.43 KG/M2

## 2025-03-26 DIAGNOSIS — E11.65 TYPE 2 DIABETES MELLITUS WITH HYPERGLYCEMIA, WITHOUT LONG-TERM CURRENT USE OF INSULIN (HCC): Primary | ICD-10-CM

## 2025-03-26 DIAGNOSIS — E11.42 TYPE 2 DIABETES MELLITUS WITH DIABETIC POLYNEUROPATHY, WITHOUT LONG-TERM CURRENT USE OF INSULIN (HCC): ICD-10-CM

## 2025-03-26 DIAGNOSIS — E55.9 VITAMIN D DEFICIENCY: ICD-10-CM

## 2025-03-26 LAB — HBA1C MFR BLD: 6.9 %

## 2025-03-26 PROCEDURE — 3044F HG A1C LEVEL LT 7.0%: CPT | Performed by: FAMILY MEDICINE

## 2025-03-26 PROCEDURE — G8427 DOCREV CUR MEDS BY ELIG CLIN: HCPCS | Performed by: FAMILY MEDICINE

## 2025-03-26 PROCEDURE — 1159F MED LIST DOCD IN RCRD: CPT | Performed by: FAMILY MEDICINE

## 2025-03-26 PROCEDURE — G8400 PT W/DXA NO RESULTS DOC: HCPCS | Performed by: FAMILY MEDICINE

## 2025-03-26 PROCEDURE — 83036 HEMOGLOBIN GLYCOSYLATED A1C: CPT | Performed by: FAMILY MEDICINE

## 2025-03-26 PROCEDURE — 1124F ACP DISCUSS-NO DSCNMKR DOCD: CPT | Performed by: FAMILY MEDICINE

## 2025-03-26 PROCEDURE — 2022F DILAT RTA XM EVC RTNOPTHY: CPT | Performed by: FAMILY MEDICINE

## 2025-03-26 PROCEDURE — 99214 OFFICE O/P EST MOD 30 MIN: CPT | Performed by: FAMILY MEDICINE

## 2025-03-26 PROCEDURE — 3017F COLORECTAL CA SCREEN DOC REV: CPT | Performed by: FAMILY MEDICINE

## 2025-03-26 PROCEDURE — G8417 CALC BMI ABV UP PARAM F/U: HCPCS | Performed by: FAMILY MEDICINE

## 2025-03-26 PROCEDURE — 1036F TOBACCO NON-USER: CPT | Performed by: FAMILY MEDICINE

## 2025-03-26 PROCEDURE — 1090F PRES/ABSN URINE INCON ASSESS: CPT | Performed by: FAMILY MEDICINE

## 2025-03-26 RX ORDER — BISOPROLOL FUMARATE AND HYDROCHLOROTHIAZIDE 10; 6.25 MG/1; MG/1
1 TABLET ORAL DAILY
Qty: 90 TABLET | Refills: 3 | Status: SHIPPED | OUTPATIENT
Start: 2025-03-26

## 2025-03-26 RX ORDER — LOSARTAN POTASSIUM 100 MG/1
100 TABLET ORAL DAILY
Qty: 90 TABLET | Refills: 3 | Status: SHIPPED | OUTPATIENT
Start: 2025-03-26

## 2025-03-26 RX ORDER — TIRZEPATIDE 7.5 MG/.5ML
7.5 INJECTION, SOLUTION SUBCUTANEOUS WEEKLY
Qty: 6 ML | Refills: 3 | Status: SHIPPED | OUTPATIENT
Start: 2025-03-26

## 2025-03-26 NOTE — PROGRESS NOTES
[facility] [employer] [facility] Department of Endocrinology Diabetes and Metabolism   [address]  [place] [address]  Phone: [phone]  Fax: [phone]    Date of Service: 3/26/2025    Primary Care Physician: Pascual Mcfarland DO  Referring physician: No ref. provider found  Provider: JENNI Yap - CNP     Reason for the visit:  Type 2 diabetes    History of Present Illness:  The history is provided by the patient. No  was used. Accuracy of the patient data is excellent.  Sravanthi Lr is a very pleasant 69 y.o. female seen today for diabetes management     Admitted to the hospital on 3/10/2024 due to obstructive kidney stone and was found to be in DKA.    Sravanthi Lr was diagnosed with diabetes several years ago and currently on Jardiance 25 mg daily and Mounjaro 5 mg weekly    Was able to come off insulin at last visit with the addition of Mounjaro    Did not tolerate metformin- diarrhea    Checking blood sugars daily  Usually > 140-150    1 hour Post prandial usually >200       Most recent A1c results summarized below  Lab Results   Component Value Date/Time    LABA1C 6.9 03/26/2025 12:29 PM    LABA1C 6.3 09/30/2024 11:15 AM    LABA1C 9.1 03/11/2024 06:45 AM     Patient has had no hypoglycemic episodes   The patient has been mindful of what has been eating and is following diabetes diet    I reviewed current medications and the patient has no issues with diabetes medications   The patient is due for an eye exam.  The patient  performs  own feet care. Podiatry as needed  Microvascular complications:  No Retinopathy, Nephropathy + Neuropathy   Macrovascular complications: no CAD, PVD, or Stroke      No HX of pancreatitis  No Hx of MTC  No HX of gastroparesis   No HX of UTI/Mycotic infection      PAST MEDICAL HISTORY   Past Medical History:   Diagnosis Date    Diabetes mellitus (HCC)     poorly controlled    Hypertension        PAST SURGICAL HISTORY

## 2025-05-25 ENCOUNTER — APPOINTMENT (OUTPATIENT)
Dept: GENERAL RADIOLOGY | Age: 70
End: 2025-05-25
Payer: MEDICARE

## 2025-05-25 ENCOUNTER — APPOINTMENT (OUTPATIENT)
Dept: CT IMAGING | Age: 70
End: 2025-05-25
Payer: MEDICARE

## 2025-05-25 ENCOUNTER — HOSPITAL ENCOUNTER (EMERGENCY)
Age: 70
Discharge: ANOTHER HOSPITAL | End: 2025-05-26
Attending: EMERGENCY MEDICINE
Payer: MEDICARE

## 2025-05-25 DIAGNOSIS — M97.11XA PERIPROSTHETIC FRACTURE AROUND INTERNAL PROSTHETIC RIGHT KNEE JOINT, INITIAL ENCOUNTER: Primary | ICD-10-CM

## 2025-05-25 DIAGNOSIS — S72.8X1A OTHER FRACTURE OF RIGHT FEMUR, INITIAL ENCOUNTER FOR CLOSED FRACTURE (HCC): ICD-10-CM

## 2025-05-25 LAB
ABO + RH BLD: NORMAL
ALBUMIN SERPL-MCNC: 4.3 G/DL (ref 3.5–5.2)
ALP SERPL-CCNC: 110 U/L (ref 35–104)
ALT SERPL-CCNC: 22 U/L (ref 0–32)
ANION GAP SERPL CALCULATED.3IONS-SCNC: 15 MMOL/L (ref 7–16)
ARM BAND NUMBER: NORMAL
AST SERPL-CCNC: 28 U/L (ref 0–31)
BASOPHILS # BLD: 0.06 K/UL (ref 0–0.2)
BASOPHILS NFR BLD: 1 % (ref 0–2)
BILIRUB SERPL-MCNC: 1.4 MG/DL (ref 0–1.2)
BLOOD BANK SAMPLE EXPIRATION: NORMAL
BLOOD GROUP ANTIBODIES SERPL: NEGATIVE
BUN SERPL-MCNC: 39 MG/DL (ref 6–23)
CALCIUM SERPL-MCNC: 9.7 MG/DL (ref 8.6–10.2)
CHLORIDE SERPL-SCNC: 100 MMOL/L (ref 98–107)
CK SERPL-CCNC: 359 U/L (ref 20–180)
CO2 SERPL-SCNC: 20 MMOL/L (ref 22–29)
CREAT SERPL-MCNC: 1.5 MG/DL (ref 0.5–1)
EOSINOPHIL # BLD: 0.09 K/UL (ref 0.05–0.5)
EOSINOPHILS RELATIVE PERCENT: 1 % (ref 0–6)
ERYTHROCYTE [DISTWIDTH] IN BLOOD BY AUTOMATED COUNT: 12.5 % (ref 11.5–15)
GFR, ESTIMATED: 38 ML/MIN/1.73M2
GLUCOSE SERPL-MCNC: 180 MG/DL (ref 74–99)
HCT VFR BLD AUTO: 35.5 % (ref 34–48)
HGB BLD-MCNC: 12.1 G/DL (ref 11.5–15.5)
IMM GRANULOCYTES # BLD AUTO: 0.08 K/UL (ref 0–0.58)
IMM GRANULOCYTES NFR BLD: 1 % (ref 0–5)
INR PPP: 1.1
LYMPHOCYTES NFR BLD: 0.81 K/UL (ref 1.5–4)
LYMPHOCYTES RELATIVE PERCENT: 8 % (ref 20–42)
MCH RBC QN AUTO: 30.6 PG (ref 26–35)
MCHC RBC AUTO-ENTMCNC: 34.1 G/DL (ref 32–34.5)
MCV RBC AUTO: 89.9 FL (ref 80–99.9)
MONOCYTES NFR BLD: 0.46 K/UL (ref 0.1–0.95)
MONOCYTES NFR BLD: 4 % (ref 2–12)
NEUTROPHILS NFR BLD: 86 % (ref 43–80)
NEUTS SEG NFR BLD: 8.85 K/UL (ref 1.8–7.3)
PLATELET # BLD AUTO: 236 K/UL (ref 130–450)
PMV BLD AUTO: 8.9 FL (ref 7–12)
POTASSIUM SERPL-SCNC: 4 MMOL/L (ref 3.5–5)
PROT SERPL-MCNC: 7.3 G/DL (ref 6.4–8.3)
PROTHROMBIN TIME: 11.7 SEC (ref 9.3–12.4)
RBC # BLD AUTO: 3.95 M/UL (ref 3.5–5.5)
SODIUM SERPL-SCNC: 135 MMOL/L (ref 132–146)
WBC OTHER # BLD: 10.4 K/UL (ref 4.5–11.5)

## 2025-05-25 PROCEDURE — 6360000002 HC RX W HCPCS

## 2025-05-25 PROCEDURE — 6360000002 HC RX W HCPCS: Performed by: EMERGENCY MEDICINE

## 2025-05-25 PROCEDURE — 86850 RBC ANTIBODY SCREEN: CPT

## 2025-05-25 PROCEDURE — 73552 X-RAY EXAM OF FEMUR 2/>: CPT

## 2025-05-25 PROCEDURE — 82550 ASSAY OF CK (CPK): CPT

## 2025-05-25 PROCEDURE — 85610 PROTHROMBIN TIME: CPT

## 2025-05-25 PROCEDURE — 93005 ELECTROCARDIOGRAM TRACING: CPT | Performed by: EMERGENCY MEDICINE

## 2025-05-25 PROCEDURE — 73700 CT LOWER EXTREMITY W/O DYE: CPT

## 2025-05-25 PROCEDURE — 73562 X-RAY EXAM OF KNEE 3: CPT

## 2025-05-25 PROCEDURE — 2580000003 HC RX 258

## 2025-05-25 PROCEDURE — 71045 X-RAY EXAM CHEST 1 VIEW: CPT

## 2025-05-25 PROCEDURE — 85025 COMPLETE CBC W/AUTO DIFF WBC: CPT

## 2025-05-25 PROCEDURE — 73502 X-RAY EXAM HIP UNI 2-3 VIEWS: CPT

## 2025-05-25 PROCEDURE — 73590 X-RAY EXAM OF LOWER LEG: CPT

## 2025-05-25 PROCEDURE — 86900 BLOOD TYPING SEROLOGIC ABO: CPT

## 2025-05-25 PROCEDURE — 96374 THER/PROPH/DIAG INJ IV PUSH: CPT

## 2025-05-25 PROCEDURE — 80053 COMPREHEN METABOLIC PANEL: CPT

## 2025-05-25 PROCEDURE — 96376 TX/PRO/DX INJ SAME DRUG ADON: CPT

## 2025-05-25 PROCEDURE — 86901 BLOOD TYPING SEROLOGIC RH(D): CPT

## 2025-05-25 PROCEDURE — 99285 EMERGENCY DEPT VISIT HI MDM: CPT

## 2025-05-25 RX ORDER — FENTANYL CITRATE 50 UG/ML
25 INJECTION, SOLUTION INTRAMUSCULAR; INTRAVENOUS ONCE
Refills: 0 | Status: COMPLETED | OUTPATIENT
Start: 2025-05-25 | End: 2025-05-25

## 2025-05-25 RX ORDER — 0.9 % SODIUM CHLORIDE 0.9 %
500 INTRAVENOUS SOLUTION INTRAVENOUS ONCE
Status: COMPLETED | OUTPATIENT
Start: 2025-05-25 | End: 2025-05-25

## 2025-05-25 RX ORDER — HYDROCODONE BITARTRATE AND ACETAMINOPHEN 5; 325 MG/1; MG/1
1 TABLET ORAL ONCE
Status: DISCONTINUED | OUTPATIENT
Start: 2025-05-25 | End: 2025-05-25

## 2025-05-25 RX ORDER — FENTANYL CITRATE 50 UG/ML
INJECTION, SOLUTION INTRAMUSCULAR; INTRAVENOUS
Status: COMPLETED
Start: 2025-05-25 | End: 2025-05-25

## 2025-05-25 RX ADMIN — SODIUM CHLORIDE 500 ML: 0.9 INJECTION, SOLUTION INTRAVENOUS at 17:48

## 2025-05-25 RX ADMIN — FENTANYL CITRATE 25 MCG: 50 INJECTION, SOLUTION INTRAMUSCULAR; INTRAVENOUS at 17:47

## 2025-05-25 RX ADMIN — FENTANYL CITRATE 25 MCG: 50 INJECTION INTRAMUSCULAR; INTRAVENOUS at 17:47

## 2025-05-25 RX ADMIN — FENTANYL CITRATE 25 MCG: 50 INJECTION INTRAMUSCULAR; INTRAVENOUS at 15:42

## 2025-05-25 ASSESSMENT — PAIN DESCRIPTION - ORIENTATION: ORIENTATION: RIGHT

## 2025-05-25 ASSESSMENT — PAIN - FUNCTIONAL ASSESSMENT: PAIN_FUNCTIONAL_ASSESSMENT: PREVENTS OR INTERFERES SOME ACTIVE ACTIVITIES AND ADLS

## 2025-05-25 ASSESSMENT — PAIN SCALES - GENERAL: PAINLEVEL_OUTOF10: 10

## 2025-05-25 ASSESSMENT — PAIN DESCRIPTION - PAIN TYPE: TYPE: ACUTE PAIN

## 2025-05-25 ASSESSMENT — PAIN DESCRIPTION - DESCRIPTORS: DESCRIPTORS: ACHING;THROBBING;PRESSURE

## 2025-05-25 ASSESSMENT — PAIN DESCRIPTION - LOCATION: LOCATION: KNEE

## 2025-05-25 NOTE — ED PROVIDER NOTES
[facility] EMERGENCY DEPARTMENT  EMERGENCY DEPARTMENT ENCOUNTER        Pt Name: Sravanthi Lr  MRN: [id number]  Birthdate 1955  Date of evaluation: 5/25/2025  Provider: Ye Hoskins MD  PCP: Pascual Mcfarland DO  Note Started: 2:00 PM EDT 5/25/25    CHIEF COMPLAINT       Chief Complaint   Patient presents with    Fall     Denies hitting head, no loc, denies thinners- R knee pain (hx of replacement)       HISTORY OF PRESENT ILLNESS: 1 or more Elements   History From: Patient    Limitations to history : None  Social Determinants : None    Sravanthi Lr is a 69 y.o. female with history of diabetes, CKD, hypertension who presents after a fall.  Patient mentions that [place] [employer] [place] today and while walking she tripped and landed on her right side and on her right knee.  She is complaining of right-sided knee pain.  Patient mentioned that she was not able to get up and even slightly moving the knee causes increased amount of pain.  Denies hitting her head, denies loss of consciousness, denies pain in any other sites.    Denies any chest pain, shortness of breath, hip pain.  He is able to wiggle both of her feet.  Denies any numbness or tingling sensations.    Denies any fever, chills, nausea, vomiting, headache, dizziness, vision changes, neck tenderness or stiffness, weakness, chest pain, palpitations, leg swelling/tenderness, sob, cough, abdominal pain, dysuria, hematuria, diarrhea, constipation, bloody stools.    Nursing Notes were all reviewed and agreed with or any disagreements were addressed in the HPI.    ROS:   Pertinent positives and negatives are stated within HPI, all other systems reviewed and are negative.      --------------------------------------------- PAST HISTORY ---------------------------------------------  Past Medical History:       Diagnosis Date    Diabetes mellitus (HCC)     poorly controlled    Hypertension        Past Surgical History:       Procedure  No lymphadedenopathy

## 2025-05-26 ENCOUNTER — ANESTHESIA (OUTPATIENT)
Dept: OPERATING ROOM | Age: 70
DRG: 481 | End: 2025-05-26
Payer: MEDICARE

## 2025-05-26 ENCOUNTER — ANESTHESIA EVENT (OUTPATIENT)
Dept: OPERATING ROOM | Age: 70
DRG: 481 | End: 2025-05-26
Payer: MEDICARE

## 2025-05-26 ENCOUNTER — HOSPITAL ENCOUNTER (INPATIENT)
Age: 70
LOS: 3 days | Discharge: NURSING, REHAB OR LONG-TERM CARE | DRG: 481 | End: 2025-05-29
Attending: FAMILY MEDICINE | Admitting: FAMILY MEDICINE
Payer: MEDICARE

## 2025-05-26 ENCOUNTER — APPOINTMENT (OUTPATIENT)
Dept: GENERAL RADIOLOGY | Age: 70
DRG: 481 | End: 2025-05-26
Attending: FAMILY MEDICINE
Payer: MEDICARE

## 2025-05-26 VITALS
HEART RATE: 82 BPM | DIASTOLIC BLOOD PRESSURE: 69 MMHG | BODY MASS INDEX: 25.68 KG/M2 | RESPIRATION RATE: 17 BRPM | OXYGEN SATURATION: 98 % | SYSTOLIC BLOOD PRESSURE: 121 MMHG | WEIGHT: 136 LBS | HEIGHT: 61 IN | TEMPERATURE: 98 F

## 2025-05-26 DIAGNOSIS — M97.01XA PERIPROSTHETIC FRACTURE AROUND INTERNAL PROSTHETIC RIGHT HIP JOINT, INITIAL ENCOUNTER (HCC): Primary | ICD-10-CM

## 2025-05-26 LAB
ABO + RH BLD: NORMAL
ALBUMIN SERPL-MCNC: 3.7 G/DL (ref 3.5–5.2)
ALP SERPL-CCNC: 98 U/L (ref 35–104)
ALT SERPL-CCNC: 19 U/L (ref 0–35)
ANION GAP SERPL CALCULATED.3IONS-SCNC: 15 MMOL/L (ref 7–16)
ARM BAND NUMBER: NORMAL
AST SERPL-CCNC: 26 U/L (ref 0–35)
BILIRUB SERPL-MCNC: 1.4 MG/DL (ref 0–1.2)
BLOOD BANK SAMPLE EXPIRATION: NORMAL
BLOOD GROUP ANTIBODIES SERPL: NEGATIVE
BUN SERPL-MCNC: 33 MG/DL (ref 8–23)
CALCIUM SERPL-MCNC: 9 MG/DL (ref 8.8–10.2)
CHLORIDE SERPL-SCNC: 108 MMOL/L (ref 98–107)
CO2 SERPL-SCNC: 17 MMOL/L (ref 22–29)
CREAT SERPL-MCNC: 1.4 MG/DL (ref 0.5–1)
GFR, ESTIMATED: 39 ML/MIN/1.73M2
GLUCOSE BLD-MCNC: 100 MG/DL (ref 74–99)
GLUCOSE BLD-MCNC: 119 MG/DL (ref 74–99)
GLUCOSE BLD-MCNC: 218 MG/DL (ref 74–99)
GLUCOSE BLD-MCNC: 87 MG/DL (ref 74–99)
GLUCOSE SERPL-MCNC: 91 MG/DL (ref 74–99)
HBA1C MFR BLD: 6.5 % (ref 4–5.6)
INR PPP: 1.1
POTASSIUM SERPL-SCNC: 3.7 MMOL/L (ref 3.5–5.1)
PROT SERPL-MCNC: 6.3 G/DL (ref 6.4–8.3)
PROTHROMBIN TIME: 12.4 SEC (ref 9.3–12.4)
SODIUM SERPL-SCNC: 140 MMOL/L (ref 136–145)

## 2025-05-26 PROCEDURE — 86900 BLOOD TYPING SEROLOGIC ABO: CPT

## 2025-05-26 PROCEDURE — 6370000000 HC RX 637 (ALT 250 FOR IP): Performed by: ANESTHESIOLOGY

## 2025-05-26 PROCEDURE — 3600000005 HC SURGERY LEVEL 5 BASE: Performed by: STUDENT IN AN ORGANIZED HEALTH CARE EDUCATION/TRAINING PROGRAM

## 2025-05-26 PROCEDURE — 2580000003 HC RX 258

## 2025-05-26 PROCEDURE — 6370000000 HC RX 637 (ALT 250 FOR IP)

## 2025-05-26 PROCEDURE — 7100000001 HC PACU RECOVERY - ADDTL 15 MIN: Performed by: STUDENT IN AN ORGANIZED HEALTH CARE EDUCATION/TRAINING PROGRAM

## 2025-05-26 PROCEDURE — 2709999900 HC NON-CHARGEABLE SUPPLY: Performed by: STUDENT IN AN ORGANIZED HEALTH CARE EDUCATION/TRAINING PROGRAM

## 2025-05-26 PROCEDURE — 6360000002 HC RX W HCPCS: Performed by: ANESTHESIOLOGY

## 2025-05-26 PROCEDURE — 2500000003 HC RX 250 WO HCPCS

## 2025-05-26 PROCEDURE — C1769 GUIDE WIRE: HCPCS | Performed by: STUDENT IN AN ORGANIZED HEALTH CARE EDUCATION/TRAINING PROGRAM

## 2025-05-26 PROCEDURE — 83036 HEMOGLOBIN GLYCOSYLATED A1C: CPT

## 2025-05-26 PROCEDURE — 7100000000 HC PACU RECOVERY - FIRST 15 MIN: Performed by: STUDENT IN AN ORGANIZED HEALTH CARE EDUCATION/TRAINING PROGRAM

## 2025-05-26 PROCEDURE — 86850 RBC ANTIBODY SCREEN: CPT

## 2025-05-26 PROCEDURE — 80053 COMPREHEN METABOLIC PANEL: CPT

## 2025-05-26 PROCEDURE — C1713 ANCHOR/SCREW BN/BN,TIS/BN: HCPCS | Performed by: STUDENT IN AN ORGANIZED HEALTH CARE EDUCATION/TRAINING PROGRAM

## 2025-05-26 PROCEDURE — 3600000015 HC SURGERY LEVEL 5 ADDTL 15MIN: Performed by: STUDENT IN AN ORGANIZED HEALTH CARE EDUCATION/TRAINING PROGRAM

## 2025-05-26 PROCEDURE — 6360000002 HC RX W HCPCS

## 2025-05-26 PROCEDURE — 36415 COLL VENOUS BLD VENIPUNCTURE: CPT

## 2025-05-26 PROCEDURE — 86901 BLOOD TYPING SEROLOGIC RH(D): CPT

## 2025-05-26 PROCEDURE — 96376 TX/PRO/DX INJ SAME DRUG ADON: CPT

## 2025-05-26 PROCEDURE — 82962 GLUCOSE BLOOD TEST: CPT

## 2025-05-26 PROCEDURE — 1200000000 HC SEMI PRIVATE

## 2025-05-26 PROCEDURE — C1776 JOINT DEVICE (IMPLANTABLE): HCPCS | Performed by: STUDENT IN AN ORGANIZED HEALTH CARE EDUCATION/TRAINING PROGRAM

## 2025-05-26 PROCEDURE — 0QSB04Z REPOSITION RIGHT LOWER FEMUR WITH INTERNAL FIXATION DEVICE, OPEN APPROACH: ICD-10-PCS | Performed by: STUDENT IN AN ORGANIZED HEALTH CARE EDUCATION/TRAINING PROGRAM

## 2025-05-26 PROCEDURE — 3700000000 HC ANESTHESIA ATTENDED CARE: Performed by: STUDENT IN AN ORGANIZED HEALTH CARE EDUCATION/TRAINING PROGRAM

## 2025-05-26 PROCEDURE — L1832 KO ADJ JNT POS R SUP PRE CST: HCPCS

## 2025-05-26 PROCEDURE — 85610 PROTHROMBIN TIME: CPT

## 2025-05-26 PROCEDURE — 2720000010 HC SURG SUPPLY STERILE: Performed by: STUDENT IN AN ORGANIZED HEALTH CARE EDUCATION/TRAINING PROGRAM

## 2025-05-26 PROCEDURE — 99223 1ST HOSP IP/OBS HIGH 75: CPT

## 2025-05-26 PROCEDURE — 2500000003 HC RX 250 WO HCPCS: Performed by: STUDENT IN AN ORGANIZED HEALTH CARE EDUCATION/TRAINING PROGRAM

## 2025-05-26 PROCEDURE — 3700000001 HC ADD 15 MINUTES (ANESTHESIA): Performed by: STUDENT IN AN ORGANIZED HEALTH CARE EDUCATION/TRAINING PROGRAM

## 2025-05-26 PROCEDURE — 6360000002 HC RX W HCPCS: Performed by: STUDENT IN AN ORGANIZED HEALTH CARE EDUCATION/TRAINING PROGRAM

## 2025-05-26 DEVICE — SCREW VA LCK OPTILINK SLF TP 5.0X75MM: Type: IMPLANTABLE DEVICE | Site: FEMUR | Status: FUNCTIONAL

## 2025-05-26 DEVICE — SCREW VA LCK OPTILINK S/T 5.0X65MM: Type: IMPLANTABLE DEVICE | Site: FEMUR | Status: FUNCTIONAL

## 2025-05-26 DEVICE — SCREW BNE L32MM DIA4.5MM PROX CORT TIB S STL ST LOK FULL: Type: IMPLANTABLE DEVICE | Site: FEMUR | Status: FUNCTIONAL

## 2025-05-26 DEVICE — SCREW BNE L40MM DIA4.5MM PROX CORT TIB S STL ST LOK FULL: Type: IMPLANTABLE DEVICE | Site: FEMUR | Status: FUNCTIONAL

## 2025-05-26 DEVICE — PLATE BNE L230MM 10 H ST R CNDYL S STL CRV LOK COMPR VAR: Type: IMPLANTABLE DEVICE | Site: FEMUR | Status: FUNCTIONAL

## 2025-05-26 DEVICE — SYSTEM FIXATION SCREOPTILINK 5.0 VAL SLF TPNG SD 80: Type: IMPLANTABLE DEVICE | Site: FEMUR | Status: FUNCTIONAL

## 2025-05-26 DEVICE — SCREW BNE L36MM DIA4.5MM PROX CORT TIB S STL ST LOK FULL: Type: IMPLANTABLE DEVICE | Site: FEMUR | Status: FUNCTIONAL

## 2025-05-26 RX ORDER — PHENYLEPHRINE HCL IN 0.9% NACL 1 MG/10 ML
SYRINGE (ML) INTRAVENOUS
Status: DISCONTINUED | OUTPATIENT
Start: 2025-05-26 | End: 2025-05-26 | Stop reason: SDUPTHER

## 2025-05-26 RX ORDER — SODIUM CHLORIDE 0.9 % (FLUSH) 0.9 %
5-40 SYRINGE (ML) INJECTION PRN
Status: DISCONTINUED | OUTPATIENT
Start: 2025-05-26 | End: 2025-05-26 | Stop reason: HOSPADM

## 2025-05-26 RX ORDER — MORPHINE SULFATE 4 MG/ML
4 INJECTION, SOLUTION INTRAMUSCULAR; INTRAVENOUS
Refills: 0 | Status: DISCONTINUED | OUTPATIENT
Start: 2025-05-26 | End: 2025-05-29 | Stop reason: HOSPADM

## 2025-05-26 RX ORDER — SODIUM CHLORIDE 9 MG/ML
INJECTION, SOLUTION INTRAVENOUS CONTINUOUS
Status: DISCONTINUED | OUTPATIENT
Start: 2025-05-26 | End: 2025-05-26

## 2025-05-26 RX ORDER — SCOPOLAMINE 1 MG/3D
PATCH, EXTENDED RELEASE TRANSDERMAL
Status: DISPENSED
Start: 2025-05-26 | End: 2025-05-27

## 2025-05-26 RX ORDER — ACETAMINOPHEN 650 MG/1
650 SUPPOSITORY RECTAL EVERY 6 HOURS PRN
Status: DISCONTINUED | OUTPATIENT
Start: 2025-05-26 | End: 2025-05-29 | Stop reason: HOSPADM

## 2025-05-26 RX ORDER — LIDOCAINE HYDROCHLORIDE 20 MG/ML
INJECTION, SOLUTION INTRAVENOUS
Status: DISCONTINUED | OUTPATIENT
Start: 2025-05-26 | End: 2025-05-26 | Stop reason: SDUPTHER

## 2025-05-26 RX ORDER — SODIUM CHLORIDE 9 MG/ML
INJECTION, SOLUTION INTRAVENOUS PRN
Status: DISCONTINUED | OUTPATIENT
Start: 2025-05-26 | End: 2025-05-26 | Stop reason: HOSPADM

## 2025-05-26 RX ORDER — OXYCODONE HYDROCHLORIDE 5 MG/1
5 TABLET ORAL EVERY 6 HOURS PRN
Qty: 28 TABLET | Refills: 0 | Status: SHIPPED | OUTPATIENT
Start: 2025-05-26 | End: 2025-06-02

## 2025-05-26 RX ORDER — ROCURONIUM BROMIDE 10 MG/ML
INJECTION, SOLUTION INTRAVENOUS
Status: DISCONTINUED | OUTPATIENT
Start: 2025-05-26 | End: 2025-05-26 | Stop reason: SDUPTHER

## 2025-05-26 RX ORDER — SODIUM CHLORIDE 0.9 % (FLUSH) 0.9 %
5-40 SYRINGE (ML) INJECTION EVERY 12 HOURS SCHEDULED
Status: DISCONTINUED | OUTPATIENT
Start: 2025-05-26 | End: 2025-05-26 | Stop reason: HOSPADM

## 2025-05-26 RX ORDER — ACETAMINOPHEN 325 MG/1
650 TABLET ORAL EVERY 6 HOURS PRN
Status: DISCONTINUED | OUTPATIENT
Start: 2025-05-26 | End: 2025-05-29 | Stop reason: HOSPADM

## 2025-05-26 RX ORDER — SODIUM CHLORIDE 9 MG/ML
INJECTION, SOLUTION INTRAVENOUS PRN
Status: DISCONTINUED | OUTPATIENT
Start: 2025-05-26 | End: 2025-05-29 | Stop reason: HOSPADM

## 2025-05-26 RX ORDER — SODIUM CHLORIDE, SODIUM LACTATE, POTASSIUM CHLORIDE, CALCIUM CHLORIDE 600; 310; 30; 20 MG/100ML; MG/100ML; MG/100ML; MG/100ML
INJECTION, SOLUTION INTRAVENOUS CONTINUOUS
Status: DISCONTINUED | OUTPATIENT
Start: 2025-05-26 | End: 2025-05-29 | Stop reason: HOSPADM

## 2025-05-26 RX ORDER — DEXAMETHASONE SODIUM PHOSPHATE 10 MG/ML
INJECTION, SOLUTION INTRA-ARTICULAR; INTRALESIONAL; INTRAMUSCULAR; INTRAVENOUS; SOFT TISSUE
Status: DISCONTINUED | OUTPATIENT
Start: 2025-05-26 | End: 2025-05-26 | Stop reason: SDUPTHER

## 2025-05-26 RX ORDER — FIBRINOGEN HUMAN AND THROMBIN HUMAN 1 ML
KIT TOPICAL PRN
Status: DISCONTINUED | OUTPATIENT
Start: 2025-05-26 | End: 2025-05-26 | Stop reason: HOSPADM

## 2025-05-26 RX ORDER — OXYCODONE HYDROCHLORIDE 5 MG/1
5 TABLET ORAL EVERY 4 HOURS PRN
Refills: 0 | Status: DISCONTINUED | OUTPATIENT
Start: 2025-05-26 | End: 2025-05-29 | Stop reason: HOSPADM

## 2025-05-26 RX ORDER — TIRZEPATIDE 5 MG/.5ML
INJECTION, SOLUTION SUBCUTANEOUS
COMMUNITY
Start: 2025-03-10

## 2025-05-26 RX ORDER — SODIUM CHLORIDE 0.9 % (FLUSH) 0.9 %
10 SYRINGE (ML) INJECTION PRN
Status: DISCONTINUED | OUTPATIENT
Start: 2025-05-26 | End: 2025-05-29 | Stop reason: HOSPADM

## 2025-05-26 RX ORDER — POTASSIUM CHLORIDE 1500 MG/1
40 TABLET, EXTENDED RELEASE ORAL PRN
Status: DISCONTINUED | OUTPATIENT
Start: 2025-05-26 | End: 2025-05-29 | Stop reason: HOSPADM

## 2025-05-26 RX ORDER — DIPHENHYDRAMINE HYDROCHLORIDE 50 MG/ML
12.5 INJECTION, SOLUTION INTRAMUSCULAR; INTRAVENOUS
Status: DISCONTINUED | OUTPATIENT
Start: 2025-05-26 | End: 2025-05-26 | Stop reason: HOSPADM

## 2025-05-26 RX ORDER — ONDANSETRON 4 MG/1
4 TABLET, ORALLY DISINTEGRATING ORAL EVERY 8 HOURS PRN
Status: DISCONTINUED | OUTPATIENT
Start: 2025-05-26 | End: 2025-05-29 | Stop reason: HOSPADM

## 2025-05-26 RX ORDER — INSULIN LISPRO 100 [IU]/ML
0-4 INJECTION, SOLUTION INTRAVENOUS; SUBCUTANEOUS
Status: DISCONTINUED | OUTPATIENT
Start: 2025-05-26 | End: 2025-05-26 | Stop reason: SDUPTHER

## 2025-05-26 RX ORDER — POLYETHYLENE GLYCOL 3350 17 G/17G
17 POWDER, FOR SOLUTION ORAL DAILY PRN
Status: DISCONTINUED | OUTPATIENT
Start: 2025-05-26 | End: 2025-05-29 | Stop reason: HOSPADM

## 2025-05-26 RX ORDER — OXYCODONE HYDROCHLORIDE 10 MG/1
10 TABLET ORAL EVERY 4 HOURS PRN
Refills: 0 | Status: DISCONTINUED | OUTPATIENT
Start: 2025-05-26 | End: 2025-05-29 | Stop reason: HOSPADM

## 2025-05-26 RX ORDER — SODIUM CHLORIDE 9 MG/ML
INJECTION, SOLUTION INTRAVENOUS
Status: DISCONTINUED | OUTPATIENT
Start: 2025-05-26 | End: 2025-05-26 | Stop reason: SDUPTHER

## 2025-05-26 RX ORDER — OXYCODONE HYDROCHLORIDE 5 MG/1
5 TABLET ORAL EVERY 4 HOURS PRN
Status: DISCONTINUED | OUTPATIENT
Start: 2025-05-26 | End: 2025-05-26 | Stop reason: SDUPTHER

## 2025-05-26 RX ORDER — FENTANYL CITRATE 50 UG/ML
25 INJECTION, SOLUTION INTRAMUSCULAR; INTRAVENOUS ONCE
Refills: 0 | Status: COMPLETED | OUTPATIENT
Start: 2025-05-26 | End: 2025-05-26

## 2025-05-26 RX ORDER — ONDANSETRON 2 MG/ML
INJECTION INTRAMUSCULAR; INTRAVENOUS
Status: DISCONTINUED | OUTPATIENT
Start: 2025-05-26 | End: 2025-05-26 | Stop reason: SDUPTHER

## 2025-05-26 RX ORDER — ASPIRIN 81 MG/1
81 TABLET ORAL 2 TIMES DAILY
Qty: 60 TABLET | Refills: 0 | Status: SHIPPED | OUTPATIENT
Start: 2025-05-26 | End: 2025-05-26 | Stop reason: HOSPADM

## 2025-05-26 RX ORDER — CEFAZOLIN SODIUM 1 G/3ML
INJECTION, POWDER, FOR SOLUTION INTRAMUSCULAR; INTRAVENOUS
Status: DISCONTINUED | OUTPATIENT
Start: 2025-05-26 | End: 2025-05-26 | Stop reason: SDUPTHER

## 2025-05-26 RX ORDER — ENOXAPARIN SODIUM 100 MG/ML
40 INJECTION SUBCUTANEOUS DAILY
Status: DISCONTINUED | OUTPATIENT
Start: 2025-05-26 | End: 2025-05-28

## 2025-05-26 RX ORDER — MEPERIDINE HYDROCHLORIDE 25 MG/ML
12.5 INJECTION INTRAMUSCULAR; INTRAVENOUS; SUBCUTANEOUS EVERY 5 MIN PRN
Status: DISCONTINUED | OUTPATIENT
Start: 2025-05-26 | End: 2025-05-26 | Stop reason: HOSPADM

## 2025-05-26 RX ORDER — DEXTROSE MONOHYDRATE 100 MG/ML
INJECTION, SOLUTION INTRAVENOUS CONTINUOUS PRN
Status: DISCONTINUED | OUTPATIENT
Start: 2025-05-26 | End: 2025-05-29 | Stop reason: HOSPADM

## 2025-05-26 RX ORDER — ACETAMINOPHEN 500 MG
500 TABLET ORAL 4 TIMES DAILY PRN
Qty: 360 TABLET | Refills: 1 | Status: SHIPPED | OUTPATIENT
Start: 2025-05-26

## 2025-05-26 RX ORDER — GLUCAGON 1 MG/ML
1 KIT INJECTION PRN
Status: DISCONTINUED | OUTPATIENT
Start: 2025-05-26 | End: 2025-05-26 | Stop reason: SDUPTHER

## 2025-05-26 RX ORDER — NALOXONE HYDROCHLORIDE 0.4 MG/ML
INJECTION, SOLUTION INTRAMUSCULAR; INTRAVENOUS; SUBCUTANEOUS PRN
Status: DISCONTINUED | OUTPATIENT
Start: 2025-05-26 | End: 2025-05-26 | Stop reason: HOSPADM

## 2025-05-26 RX ORDER — MAGNESIUM SULFATE IN WATER 40 MG/ML
2000 INJECTION, SOLUTION INTRAVENOUS PRN
Status: DISCONTINUED | OUTPATIENT
Start: 2025-05-26 | End: 2025-05-29 | Stop reason: HOSPADM

## 2025-05-26 RX ORDER — DEXTROSE MONOHYDRATE 100 MG/ML
INJECTION, SOLUTION INTRAVENOUS CONTINUOUS PRN
Status: DISCONTINUED | OUTPATIENT
Start: 2025-05-26 | End: 2025-05-26 | Stop reason: SDUPTHER

## 2025-05-26 RX ORDER — MORPHINE SULFATE 2 MG/ML
2 INJECTION, SOLUTION INTRAMUSCULAR; INTRAVENOUS
Refills: 0 | Status: DISCONTINUED | OUTPATIENT
Start: 2025-05-26 | End: 2025-05-29 | Stop reason: HOSPADM

## 2025-05-26 RX ORDER — SENNOSIDES 8.6 MG
325 CAPSULE ORAL 2 TIMES DAILY
Qty: 60 TABLET | Refills: 0 | Status: SHIPPED | OUTPATIENT
Start: 2025-05-26 | End: 2025-07-25

## 2025-05-26 RX ORDER — INSULIN LISPRO 100 [IU]/ML
0-4 INJECTION, SOLUTION INTRAVENOUS; SUBCUTANEOUS
Status: DISCONTINUED | OUTPATIENT
Start: 2025-05-26 | End: 2025-05-29 | Stop reason: HOSPADM

## 2025-05-26 RX ORDER — HYDROMORPHONE HYDROCHLORIDE 1 MG/ML
0.5 INJECTION, SOLUTION INTRAMUSCULAR; INTRAVENOUS; SUBCUTANEOUS EVERY 5 MIN PRN
Status: DISCONTINUED | OUTPATIENT
Start: 2025-05-26 | End: 2025-05-26 | Stop reason: HOSPADM

## 2025-05-26 RX ORDER — HYDROMORPHONE HYDROCHLORIDE 1 MG/ML
0.25 INJECTION, SOLUTION INTRAMUSCULAR; INTRAVENOUS; SUBCUTANEOUS EVERY 5 MIN PRN
Status: DISCONTINUED | OUTPATIENT
Start: 2025-05-26 | End: 2025-05-26 | Stop reason: HOSPADM

## 2025-05-26 RX ORDER — SODIUM CHLORIDE 0.9 % (FLUSH) 0.9 %
5-40 SYRINGE (ML) INJECTION EVERY 12 HOURS SCHEDULED
Status: DISCONTINUED | OUTPATIENT
Start: 2025-05-26 | End: 2025-05-29 | Stop reason: HOSPADM

## 2025-05-26 RX ORDER — OXYCODONE HYDROCHLORIDE 5 MG/1
5 TABLET ORAL EVERY 6 HOURS PRN
Qty: 28 TABLET | Refills: 0 | Status: SHIPPED | OUTPATIENT
Start: 2025-05-26 | End: 2025-05-26

## 2025-05-26 RX ORDER — TRAMADOL HYDROCHLORIDE 50 MG/1
50 TABLET ORAL EVERY 6 HOURS PRN
Status: DISCONTINUED | OUTPATIENT
Start: 2025-05-26 | End: 2025-05-26 | Stop reason: ALTCHOICE

## 2025-05-26 RX ORDER — ONDANSETRON 2 MG/ML
4 INJECTION INTRAMUSCULAR; INTRAVENOUS EVERY 6 HOURS PRN
Status: DISCONTINUED | OUTPATIENT
Start: 2025-05-26 | End: 2025-05-29 | Stop reason: HOSPADM

## 2025-05-26 RX ORDER — GLUCAGON 1 MG/ML
1 KIT INJECTION PRN
Status: DISCONTINUED | OUTPATIENT
Start: 2025-05-26 | End: 2025-05-29 | Stop reason: HOSPADM

## 2025-05-26 RX ORDER — SCOPOLAMINE 1 MG/3D
1 PATCH, EXTENDED RELEASE TRANSDERMAL ONCE
Status: DISCONTINUED | OUTPATIENT
Start: 2025-05-26 | End: 2025-05-26

## 2025-05-26 RX ORDER — MIDAZOLAM HYDROCHLORIDE 1 MG/ML
INJECTION, SOLUTION INTRAMUSCULAR; INTRAVENOUS
Status: DISCONTINUED | OUTPATIENT
Start: 2025-05-26 | End: 2025-05-26 | Stop reason: SDUPTHER

## 2025-05-26 RX ORDER — PROPOFOL 10 MG/ML
INJECTION, EMULSION INTRAVENOUS
Status: DISCONTINUED | OUTPATIENT
Start: 2025-05-26 | End: 2025-05-26 | Stop reason: SDUPTHER

## 2025-05-26 RX ORDER — POTASSIUM CHLORIDE 7.45 MG/ML
10 INJECTION INTRAVENOUS PRN
Status: DISCONTINUED | OUTPATIENT
Start: 2025-05-26 | End: 2025-05-29 | Stop reason: HOSPADM

## 2025-05-26 RX ORDER — FENTANYL CITRATE 50 UG/ML
INJECTION, SOLUTION INTRAMUSCULAR; INTRAVENOUS
Status: DISCONTINUED | OUTPATIENT
Start: 2025-05-26 | End: 2025-05-26 | Stop reason: SDUPTHER

## 2025-05-26 RX ADMIN — PROPOFOL 10 MG: 10 INJECTION, EMULSION INTRAVENOUS at 13:00

## 2025-05-26 RX ADMIN — ONDANSETRON 4 MG: 2 INJECTION, SOLUTION INTRAMUSCULAR; INTRAVENOUS at 14:05

## 2025-05-26 RX ADMIN — INSULIN LISPRO 1 UNITS: 100 INJECTION, SOLUTION INTRAVENOUS; SUBCUTANEOUS at 19:59

## 2025-05-26 RX ADMIN — Medication 15 MG: at 13:00

## 2025-05-26 RX ADMIN — MIDAZOLAM 1 MG: 1 INJECTION INTRAMUSCULAR; INTRAVENOUS at 12:55

## 2025-05-26 RX ADMIN — Medication 100 MCG: at 13:37

## 2025-05-26 RX ADMIN — FENTANYL CITRATE 25 MCG: 50 INJECTION INTRAMUSCULAR; INTRAVENOUS at 00:51

## 2025-05-26 RX ADMIN — OXYCODONE HYDROCHLORIDE 10 MG: 10 TABLET ORAL at 10:40

## 2025-05-26 RX ADMIN — SODIUM CHLORIDE, PRESERVATIVE FREE 10 ML: 5 INJECTION INTRAVENOUS at 20:00

## 2025-05-26 RX ADMIN — PROPOFOL 30 MG: 10 INJECTION, EMULSION INTRAVENOUS at 13:10

## 2025-05-26 RX ADMIN — ROCURONIUM BROMIDE 30 MG: 10 INJECTION, SOLUTION INTRAVENOUS at 13:02

## 2025-05-26 RX ADMIN — ACETAMINOPHEN 650 MG: 325 TABLET ORAL at 12:41

## 2025-05-26 RX ADMIN — MORPHINE SULFATE 4 MG: 4 INJECTION, SOLUTION INTRAMUSCULAR; INTRAVENOUS at 15:58

## 2025-05-26 RX ADMIN — PROPOFOL 10 MG: 10 INJECTION, EMULSION INTRAVENOUS at 13:31

## 2025-05-26 RX ADMIN — SODIUM CHLORIDE, SODIUM LACTATE, POTASSIUM CHLORIDE, AND CALCIUM CHLORIDE: .6; .31; .03; .02 INJECTION, SOLUTION INTRAVENOUS at 06:27

## 2025-05-26 RX ADMIN — HYDROMORPHONE HYDROCHLORIDE 0.5 MG: 1 INJECTION, SOLUTION INTRAMUSCULAR; INTRAVENOUS; SUBCUTANEOUS at 14:52

## 2025-05-26 RX ADMIN — WATER 2000 MG: 1 INJECTION INTRAMUSCULAR; INTRAVENOUS; SUBCUTANEOUS at 19:59

## 2025-05-26 RX ADMIN — OXYCODONE HYDROCHLORIDE 10 MG: 10 TABLET ORAL at 06:33

## 2025-05-26 RX ADMIN — ONDANSETRON 4 MG: 2 INJECTION, SOLUTION INTRAMUSCULAR; INTRAVENOUS at 20:13

## 2025-05-26 RX ADMIN — CEFAZOLIN 2 G: 1 INJECTION, POWDER, FOR SOLUTION INTRAMUSCULAR; INTRAVENOUS at 13:08

## 2025-05-26 RX ADMIN — SUGAMMADEX 200 MG: 100 INJECTION, SOLUTION INTRAVENOUS at 14:09

## 2025-05-26 RX ADMIN — Medication 200 MCG: at 13:05

## 2025-05-26 RX ADMIN — OXYCODONE HYDROCHLORIDE 10 MG: 10 TABLET ORAL at 19:58

## 2025-05-26 RX ADMIN — FENTANYL CITRATE 100 MCG: 50 INJECTION, SOLUTION INTRAMUSCULAR; INTRAVENOUS at 13:00

## 2025-05-26 RX ADMIN — LIDOCAINE HYDROCHLORIDE 100 MG: 20 INJECTION, SOLUTION INTRAVENOUS at 13:00

## 2025-05-26 RX ADMIN — DEXAMETHASONE SODIUM PHOSPHATE 10 MG: 10 INJECTION INTRAMUSCULAR; INTRAVENOUS at 13:11

## 2025-05-26 RX ADMIN — SODIUM CHLORIDE: 9 INJECTION, SOLUTION INTRAVENOUS at 12:53

## 2025-05-26 RX ADMIN — ROCURONIUM BROMIDE 20 MG: 10 INJECTION, SOLUTION INTRAVENOUS at 13:31

## 2025-05-26 ASSESSMENT — PAIN DESCRIPTION - PAIN TYPE
TYPE: ACUTE PAIN
TYPE: ACUTE PAIN

## 2025-05-26 ASSESSMENT — PAIN DESCRIPTION - LOCATION
LOCATION: LEG

## 2025-05-26 ASSESSMENT — PAIN SCALES - GENERAL
PAINLEVEL_OUTOF10: 7
PAINLEVEL_OUTOF10: 10
PAINLEVEL_OUTOF10: 9
PAINLEVEL_OUTOF10: 9
PAINLEVEL_OUTOF10: 3
PAINLEVEL_OUTOF10: 9
PAINLEVEL_OUTOF10: 10
PAINLEVEL_OUTOF10: 4
PAINLEVEL_OUTOF10: 8
PAINLEVEL_OUTOF10: 10
PAINLEVEL_OUTOF10: 0

## 2025-05-26 ASSESSMENT — PAIN DESCRIPTION - DESCRIPTORS
DESCRIPTORS: ACHING;THROBBING;TENDER
DESCRIPTORS: ACHING;DULL;SORE
DESCRIPTORS: DISCOMFORT
DESCRIPTORS: ACHING;THROBBING;DISCOMFORT
DESCRIPTORS: BURNING;SORE

## 2025-05-26 ASSESSMENT — PAIN DESCRIPTION - FREQUENCY: FREQUENCY: CONTINUOUS

## 2025-05-26 ASSESSMENT — PAIN DESCRIPTION - ORIENTATION
ORIENTATION: RIGHT

## 2025-05-26 ASSESSMENT — PAIN - FUNCTIONAL ASSESSMENT
PAIN_FUNCTIONAL_ASSESSMENT: PREVENTS OR INTERFERES SOME ACTIVE ACTIVITIES AND ADLS
PAIN_FUNCTIONAL_ASSESSMENT: ACTIVITIES ARE NOT PREVENTED
PAIN_FUNCTIONAL_ASSESSMENT: ACTIVITIES ARE NOT PREVENTED
PAIN_FUNCTIONAL_ASSESSMENT: PREVENTS OR INTERFERES SOME ACTIVE ACTIVITIES AND ADLS

## 2025-05-26 NOTE — PROGRESS NOTES
Orthopedic surgery update:    Patient examined postoperatively.  She lacks about 10 degrees of full extension on her operative knee which she states was not present prior to surgery.  I did scrutinized the preop and postoperative images in detail.  This does appear to be a posterior stabilized knee.  Occasionally the screws through this distal femur plate especially posterior can block the cam and prevent full extension.  I explained this in detail to the patient.  We will tentatively plan to return to the operating room tomorrow to remove the posterior distalmost locking screw and see if this improves her motion.  This will be performed through a small incision under light sedation with local anesthetic.  We discussed the risks associated with the procedure.  I think this is her best option to lower normal knee range of motion and get this fracture to heal in appropriate position.  She will be nothing to eat or drink after midnight.              Ramy Canales DO   Orthopaedic Surgery   5/26/2025  2:49 PM    Note: This report was completed using computerRazorsight voiced recognition software.  Every effort has been made to ensure accuracy; however, inadvertent computerized transcription errors may be present.

## 2025-05-26 NOTE — ANESTHESIA POSTPROCEDURE EVALUATION
Department of Anesthesiology  Postprocedure Note    Patient: Sravanthi Lr  MRN: [id number]  YOB: 1955  Date of evaluation: 5/26/2025    Procedure Summary       Date: 05/26/25 Room / Location: [address]    Anesthesia Start: 1253 Anesthesia Stop: 1427    Procedure: FEMUR OPEN REDUCTION INTERNAL FIXATION (Right: Leg Upper) Diagnosis:       Closed bicondylar fracture of distal femur, right, initial encounter ([facility])      (Closed bicondylar fracture of distal femur, right, initial encounter ([facility]) [S72.421A, S72.431A])    Surgeons: Ramy Canales DO Responsible Provider: Mahendra Floyd MD    Anesthesia Type: General ASA Status: 3            Anesthesia Type: General    Carly Phase I: Carly Score: 10    Carly Phase II:      Anesthesia Post Evaluation    Patient location during evaluation: PACU  Patient participation: complete - patient participated  Level of consciousness: awake and alert  Airway patency: patent  Nausea & Vomiting: no nausea and no vomiting  Cardiovascular status: hemodynamically stable  Respiratory status: acceptable  Hydration status: euvolemic  Multimodal analgesia pain management approach  Pain management: adequate    No notable events documented.

## 2025-05-26 NOTE — PROGRESS NOTES
HOSPITALIST PROGRESS NOTES     ADMITTING DATE AND TIME: 5/26/2025  1:31 AM  had no chief complaint listed for this encounter.    ADMIT DX: Periprosthetic fracture around internal prosthetic right hip joint ([facility]) [M97.01XA]  Periprosthetic fracture around internal prosthetic right hip joint, initial encounter ([facility]) [M97.01XA]      SUBJECTIVE:  Patient is being followed for Periprosthetic fracture around internal prosthetic right hip joint ([facility]) [M97.01XA]  Periprosthetic fracture around internal prosthetic right hip joint, initial encounter ([facility]) [M97.01XA]     Patient was seen examined and evaluated  Recent lab results, charts and pertinent diagnostic imaging reviewed   Ancillary service notes reviewed   Anxious     Care reviewed with nursing staff, medical and surgical specialty care, primary care and the patient's family as available.   ROS: denies fever, chills, cp, sob, n/v, HA unless stated above.      sodium chloride flush  5-40 mL IntraVENous 2 times per day    [Held by provider] enoxaparin  40 mg SubCUTAneous Daily    insulin lispro  0-4 Units SubCUTAneous 4x Daily AC & HS     sodium chloride flush, 10 mL, PRN  sodium chloride, , PRN  potassium chloride, 40 mEq, PRN   Or  potassium alternative oral replacement, 40 mEq, PRN   Or  potassium chloride, 10 mEq, PRN  magnesium sulfate, 2,000 mg, PRN  ondansetron, 4 mg, Q8H PRN   Or  ondansetron, 4 mg, Q6H PRN  polyethylene glycol, 17 g, Daily PRN  acetaminophen, 650 mg, Q6H PRN   Or  acetaminophen, 650 mg, Q6H PRN  melatonin, 3 mg, Nightly PRN  traMADol, 50 mg, Q6H PRN  oxyCODONE, 5 mg, Q4H PRN   Or  oxyCODONE, 10 mg, Q4H PRN  morphine, 2 mg, Q2H PRN   Or  morphine, 4 mg, Q2H PRN  glucose, 4 tablet, PRN  dextrose bolus, 125 mL, PRN   Or  dextrose bolus, 250 mL, PRN  glucagon (rDNA), 1 mg, PRN  dextrose, , Continuous PRN

## 2025-05-26 NOTE — CONSULTS
Department of Orthopedic Surgery  Resident Consult Note          Reason for Consult: Periprosthetic distal femur fracture    HISTORY OF PRESENT ILLNESS:      Patient is a 69-year-old female with roughly 5-year history of right total knee arthroplasty who presented to the emergency department in [place] with right knee pain.  Patient was at a [employer] when she somehow tripped and fell landing directly on the right knee.  She had immediate pain was unable to ambulate thereafter.  Upon evaluation, she is found to have a fracture and subsequently transported to [facility].  She currently denies any new onset numbness, tingling, or paresthesias.  She denies any other orthopedic complaints this time.    Past Medical History:        Diagnosis Date    Diabetes mellitus (HCC)     poorly controlled    Hypertension      Past Surgical History:        Procedure Laterality Date    APPENDECTOMY      CARPAL TUNNEL RELEASE      COLONOSCOPY N/A 7/19/2019    COLONOSCOPY DIAGNOSTIC performed by VARINDER Khan MD at [facility] ENDOSCOPY    DEBRIDEMENT Left 3/11/2024    CYSTOSCOPY RETROGRADE PYELOGRAM LEFT STENT INSERTION performed by Shawn Boykin MD at [facility] OR    FRACTURE SURGERY      rt ankle    KNEE SURGERY      arthroscopic    UPPER GASTROINTESTINAL ENDOSCOPY N/A 7/16/2019    EGD CONTROL HEMORRHAGE performed by VARINDER Khan MD at [facility] ENDOSCOPY    UPPER GASTROINTESTINAL ENDOSCOPY N/A 7/18/2019    EGD CONTROL HEMORRHAGE with BIPOLAR CAUTERY performed by Florencio Molina DO at [facility] ENDOSCOPY     Current Medications:   Current Facility-Administered Medications: glucose chewable tablet 16 g, 4 tablet, Oral, PRN  dextrose bolus 10% 125 mL, 125 mL, IntraVENous, PRN **OR** dextrose bolus 10% 250 mL, 250 mL, IntraVENous, PRN  glucagon injection 1 mg, 1 mg, SubCUTAneous, PRN  dextrose 10 % infusion, , IntraVENous, Continuous PRN  0.9 % sodium chloride infusion, , IntraVENous, Continuous  sodium chloride

## 2025-05-26 NOTE — BRIEF OP NOTE
Brief Postoperative Note      Patient: Sravanthi Lr  YOB: 1955  MRN: [id number]    Date of Procedure: 5/26/2025    Pre-Op Diagnosis Codes:   Closed displaced right periprosthetic supracondylar distal femur fracture    Post-Op Diagnosis: Same       Procedure(s):  Open reduction internal fixation right periprosthetic supracondylar femur fracture    Surgeon(s):  Ramy Canales DO    Assistant:  Resident: José Luis Dominguez DO; Star Pearson DO    Anesthesia: General    Estimated Blood Loss (mL): less than 50     Complications: None    Specimens:   * No specimens in log *    Implants:  Implant Name Type Inv. Item Serial No.  Lot No. LRB No. Used Action   PLATE BNE L230MM 10 H ST R CNDYL S STL CRV ANNEL COMPR JOSTIN - [id number]  PLATE BNE L230MM 10 H ST R CNDYL S STL CRV ANNEL COMPR JOSTIN  DEPUY [employer] [place]-  Right 1 Implanted   SCREW BNE L32MM DIA4.5MM PROX MARIO TIB S STL ST ANNEL FULL - [id number]  SCREW BNE L32MM DIA4.5MM PROX MARIO TIB S STL ST ANNEL FULL  DEPUY [employer] [place]-  Right 1 Implanted   SCREW BNE L36MM DIA4.5MM PROX MARIO TIB S STL ST ANNEL FULL - [id number]  SCREW BNE L36MM DIA4.5MM PROX MARIO TIB S STL ST ANNEL FULL  DEPUY [employer] [place]-  Right 2 Implanted   SCREW BNE L40MM DIA4.5MM PROX MARIO TIB S STL ST ANNEL FULL - [id number]  SCREW BNE L40MM DIA4.5MM PROX MARIO TIB S STL ST ANNEL FULL  DEPUY [employer] [place]-  Right 1 Implanted   SCREW VA LCK OPTILINK S/T 5.0X65MM - [id number]  SCREW VA LCK OPTILINK S/T 5.0X65MM  DEPUY [employer] [place]-  Right 1 Implanted   SCREW OPTILINK VA LCKING SLF -TP T25 SD 5.0X70MM - [id number]  SCREW OPTILINK VA LCKING SLF -TP T25 SD 5.0X70MM  DEPUY SYNTHES [place]  Right 2 Implanted   SCREW VA LCK OPTILINK SLF TP 5.0X75MM - [id number]  SCREW VA LCK OPTILINK SLF TP 5.0X75MM  DEPUY [employer] [place]-  Right 1 Implanted   SYSTEM FIXATION SCREOPTILINK 5.0 NASREEN SLF TPNG SD 80 - [id number]  SYSTEM FIXATION SCREOPTILINK 5.0 NASREEN SLF TPNG SD 80  DEPUY SYNTHES [facility]-  Right 1

## 2025-05-26 NOTE — H&P
100   CO2 20*   BUN 39*   CREATININE 1.5*     LFT:  Recent Labs     05/25/25  1523   ALKPHOS 110*   ALT 22   AST 28   BILITOT 1.4*     PT/INR:   Recent Labs     05/25/25  1523   INR 1.1     ESR:   Lab Results   Component Value Date    SEDRATE 109 (H) 03/11/2024     CRP:   Lab Results   Component Value Date    .0 (H) 03/11/2024     D Dimer: No results found for: \"DDIMER\"   Folate and B12:   Lab Results   Component Value Date    FFGEIYQM81 240 07/16/2019   ,   Lab Results   Component Value Date    FOLATE 5.7 07/16/2019     Lactic Acid:   Lab Results   Component Value Date    LACTA 1.4 03/14/2024     Thyroid Studies: No results found for: \"TSH\", \"L1CFXMZ\", \"THYROIDAB\"    Oupatient labs:  Lab Results   Component Value Date    INR 1.1 05/25/2025    LABA1C 6.9 03/26/2025       Urinalysis:    Lab Results   Component Value Date/Time    NITRU NEGATIVE 03/10/2024 07:42 PM    WBCUA 0 TO 5 03/10/2024 07:42 PM    BACTERIA MODERATE 07/16/2019 08:34 AM    RBCUA 0 TO 2 03/10/2024 07:42 PM    BLOODU MODERATE 07/16/2019 08:34 AM    GLUCOSEU 500 03/10/2024 07:42 PM       Imaging:  CT KNEE RIGHT WO CONTRAST  Result Date: 5/25/2025  1. Acute traumatic transverse fracture of the distal femur interacting with the anterior margin of the femoral component of the total knee arthroplasty. 2. Moderate hemorrhage in the popliteal fossa soft tissues around the margins of the fracture. 3. No lucency at the bone component interface.     XR TIBIA FIBULA RIGHT (2 VIEWS)  Result Date: 5/25/2025  1. Fracture of the distal femur, at the level of the femoral component of the total knee arthroplasty. 2. Osteoarthritis of the ankle.     XR HIP 2-3 VW W PELVIS RIGHT  Result Date: 5/25/2025  No fracture or dislocation of the pelvis or right hip.     XR FEMUR RIGHT (MIN 2 VIEWS)  Result Date: 5/25/2025  Periprosthetic fracture of the distal femur with posterior displacement.     XR CHEST PORTABLE  Result Date: 5/25/2025  No acute process.     XR KNEE

## 2025-05-26 NOTE — DISCHARGE INSTRUCTIONS
[facility] Department of Orthopedic Surgery  [address]   [phone]   [place] [address]    Dr. Ramy Canales, DO    Orthopaedics Discharge Instructions   Weight bearing Status - Non-weight bearing - on left lower Extremity  Pain medication Per Prescriptions  Contact Office for medication refills: [phone]  Office can refill pain med every 7 days  If patient discharging to facility then pain control will be continued per facility physician  Ice to operative/injured site for 15-30 minutes of each hour as needed  Elevate operative/injured limb on 2 pillows at home  Goal is to have limb above the heart if able  Continue DVT Prophylaxis (blood clot prevention) as prescribed: 81 mg Aspirin twice daily   Wound care -keep postoperative dressings intact for 7 days, keep skin clean and dry      Follow Up in Office in 2 weeks. You may be seen by one of the orthopedic physician assistants or nurse practitioners.     Call the office at [phone] for directions or with any questions.      Watch for these significant complications.  Call physician if they or any other problems occur:  Fever over 101°, redness, swelling or warmth at the operative site  Unrelieved nausea    Foul smelling or cloudy drainage at the operative site   Unrelieved pain    Blood soaked dressing. (Some oozing may be normal)     Numb, pale, blue, cold or tingling extremity    Future Appointments   Date Time Provider Department Center   7/29/2025 11:40 AM Priya Pennington APRN - CNP [facility]         It is the Department of Orthopaedic Trauma's standard of practice that providers will de-escalate(wean) all patients from narcotic(opioid) medications during the post-operative period.   We provide multimodal pain control but opioid medications are tapered in all of our patients.  If patient requires referral to pain management for prolonged taper off of opioid pain medication we will facilitate this process.      Follow-up with primary

## 2025-05-26 NOTE — PROGRESS NOTES
Date: 2025       Patient Name: Sravanthi Lr  : 1955      MRN: [id number]    PT order received and chart reviewed. PT evaluation held await surgical intervention.      Ramy Solorzano PT, DPT  [id number]

## 2025-05-26 NOTE — PROGRESS NOTES
4 Eyes Skin Assessment     NAME:  Sravanthi Lr  YOB: 1955  MEDICAL RECORD NUMBER:  [id number]    The patient is being assessed for  Admission    I agree that at least one RN has performed a thorough Head to Toe Skin Assessment on the patient. ALL assessment sites listed below have been assessed.      Areas assessed by both nurses:    Head, Face, Ears, Shoulders, Back, Chest, Arms, Elbows, Hands, Sacrum. Buttock, Coccyx, Ischium, and Legs. Feet and Heels        Does the Patient have a Wound? No noted wound(s)       Job Prevention initiated by RN: No  Wound Care Orders initiated by RN: No    Pressure Injury (Stage 3,4, Unstageable, DTI, NWPT, and Complex wounds) if present, place Wound referral order by RN under : No    New Ostomies, if present place, Ostomy referral order under : No     Nurse 1 eSignature: Electronically signed by Radha Carvalho RN on 5/26/25 at 2:01 AM EDT    **SHARE this note so that the co-signing nurse can place an eSignature**    Nurse 2 eSignature: Electronically signed by Yahaira Pfeiffer RN on 5/26/25 at 2:35 AM EDT

## 2025-05-26 NOTE — ANESTHESIA PRE PROCEDURE
Department of Anesthesiology  Preprocedure Note       Name:  Sravanthi Lr   Age:  69 y.o.  :  1955                                          MRN:  [id number]         Date:  2025      Surgeon: Surgeon(s):  Ramy Canales DO    Procedure: Procedure(s):  FEMUR OPEN REDUCTION INTERNAL FIXATION    Medications prior to admission:   Prior to Admission medications    Medication Sig Start Date End Date Taking? Authorizing Provider   empagliflozin (JARDIANCE) 25 MG tablet Take 1 tablet by mouth daily 3/26/25  Yes Priya Pennington APRN - CNP   losartan (COZAAR) 100 MG tablet Take 1 tablet by mouth daily 3/26/25  Yes Priya Pennington APRN - CNP   bisoprolol-hydroCHLOROthiazide (ZIAC) 10-6.25 MG per tablet Take 1 tablet by mouth daily 3/26/25  Yes Priya Pennington APRN - CNP   Tirzepatide (MOUNJARO) 7.5 MG/0.5ML SOAJ Inject 7.5 mg into the skin once a week 3/26/25  Yes Priya Pennington APRN - CNP   colchicine (COLCRYS) 0.6 MG tablet Take 1 tablet by mouth 2 times daily  Patient taking differently: Take 1 tablet by mouth 2 times daily as needed 23  Yes Jean Choi DO   MOUNJARO 5 MG/0.5ML SOAJ pen INJECT 1/2 (ONE-HALF) ML SUBCUTANEOUSLY ONCE A WEEK  Patient not taking: Reported on 2025 3/10/25   Provider, MD Beti       Current medications:    Current Facility-Administered Medications   Medication Dose Route Frequency Provider Last Rate Last Admin    sodium chloride flush 0.9 % injection 5-40 mL  5-40 mL IntraVENous 2 times per day Sara David APRN - CNP        sodium chloride flush 0.9 % injection 10 mL  10 mL IntraVENous PRN Sara David APRN - CNP        0.9 % sodium chloride infusion   IntraVENous PRN Sara David APRN - CNP        potassium chloride (KLOR-CON M) extended release tablet 40 mEq  40 mEq Oral PRN Sara David APRN - CNP        Or    potassium bicarb-citric acid (EFFER-K) effervescent tablet 40 mEq  40 mEq Oral PRN Sara David APRN - CNP        Or    potassium

## 2025-05-26 NOTE — PROGRESS NOTES
OCCUPATIONAL THERAPY    Date:2025  Patient Name: Sravanthi Lr  MRN: [id number]  : 1955  Room: [address] [place], [place] [address]              Chart reviewed. Pt on hold for surgery with ortho.  Will re-attempt at later time. Thank you for consult.    Daina Hanna, OTR/L [id number]

## 2025-05-26 NOTE — PROGRESS NOTES
1427  transferred to PACU  post ORIF femur.  Patient identification verified with surgery RN.  Ace wrap dry. Immobolizer in place.  Bilateral feet cool and warm blankets applied.  Right pedal pulst 2+, movement of toes present.  Ice to site

## 2025-05-27 ENCOUNTER — ANESTHESIA EVENT (OUTPATIENT)
Dept: OPERATING ROOM | Age: 70
DRG: 481 | End: 2025-05-27
Payer: MEDICARE

## 2025-05-27 ENCOUNTER — ANESTHESIA (OUTPATIENT)
Dept: OPERATING ROOM | Age: 70
DRG: 481 | End: 2025-05-27
Payer: MEDICARE

## 2025-05-27 ENCOUNTER — APPOINTMENT (OUTPATIENT)
Dept: GENERAL RADIOLOGY | Age: 70
DRG: 481 | End: 2025-05-27
Attending: FAMILY MEDICINE
Payer: MEDICARE

## 2025-05-27 LAB
ALBUMIN SERPL-MCNC: 3.3 G/DL (ref 3.5–5.2)
ALP SERPL-CCNC: 88 U/L (ref 35–104)
ALT SERPL-CCNC: 15 U/L (ref 0–35)
ANION GAP SERPL CALCULATED.3IONS-SCNC: 15 MMOL/L (ref 7–16)
AST SERPL-CCNC: 25 U/L (ref 0–35)
BASOPHILS # BLD: 0.01 K/UL (ref 0–0.2)
BASOPHILS NFR BLD: 0 % (ref 0–2)
BILIRUB SERPL-MCNC: 0.6 MG/DL (ref 0–1.2)
BUN SERPL-MCNC: 37 MG/DL (ref 8–23)
CALCIUM SERPL-MCNC: 8.6 MG/DL (ref 8.8–10.2)
CHLORIDE SERPL-SCNC: 105 MMOL/L (ref 98–107)
CO2 SERPL-SCNC: 18 MMOL/L (ref 22–29)
CREAT SERPL-MCNC: 1.7 MG/DL (ref 0.5–1)
EKG ATRIAL RATE: 82 BPM
EKG P AXIS: 18 DEGREES
EKG P-R INTERVAL: 170 MS
EKG Q-T INTERVAL: 392 MS
EKG QRS DURATION: 72 MS
EKG QTC CALCULATION (BAZETT): 457 MS
EKG R AXIS: 4 DEGREES
EKG T AXIS: 17 DEGREES
EKG VENTRICULAR RATE: 82 BPM
EOSINOPHIL # BLD: 0 K/UL (ref 0.05–0.5)
EOSINOPHILS RELATIVE PERCENT: 0 % (ref 0–6)
ERYTHROCYTE [DISTWIDTH] IN BLOOD BY AUTOMATED COUNT: 12.2 % (ref 11.5–15)
GFR, ESTIMATED: 33 ML/MIN/1.73M2
GLUCOSE BLD-MCNC: 123 MG/DL (ref 74–99)
GLUCOSE BLD-MCNC: 167 MG/DL (ref 74–99)
GLUCOSE BLD-MCNC: 175 MG/DL (ref 74–99)
GLUCOSE BLD-MCNC: 254 MG/DL (ref 74–99)
GLUCOSE SERPL-MCNC: 169 MG/DL (ref 74–99)
HCT VFR BLD AUTO: 26.3 % (ref 34–48)
HGB BLD-MCNC: 8.8 G/DL (ref 11.5–15.5)
IMM GRANULOCYTES # BLD AUTO: 0.05 K/UL (ref 0–0.58)
IMM GRANULOCYTES NFR BLD: 1 % (ref 0–5)
LYMPHOCYTES NFR BLD: 0.51 K/UL (ref 1.5–4)
LYMPHOCYTES RELATIVE PERCENT: 6 % (ref 20–42)
MCH RBC QN AUTO: 30.7 PG (ref 26–35)
MCHC RBC AUTO-ENTMCNC: 33.5 G/DL (ref 32–34.5)
MCV RBC AUTO: 91.6 FL (ref 80–99.9)
MONOCYTES NFR BLD: 0.59 K/UL (ref 0.1–0.95)
MONOCYTES NFR BLD: 6 % (ref 2–12)
NEUTROPHILS NFR BLD: 88 % (ref 43–80)
NEUTS SEG NFR BLD: 8.13 K/UL (ref 1.8–7.3)
PLATELET # BLD AUTO: 171 K/UL (ref 130–450)
PMV BLD AUTO: 9.4 FL (ref 7–12)
POTASSIUM SERPL-SCNC: 4.6 MMOL/L (ref 3.5–5.1)
PROT SERPL-MCNC: 5.7 G/DL (ref 6.4–8.3)
RBC # BLD AUTO: 2.87 M/UL (ref 3.5–5.5)
RBC # BLD: ABNORMAL 10*6/UL
SODIUM SERPL-SCNC: 137 MMOL/L (ref 136–145)
WBC OTHER # BLD: 9.3 K/UL (ref 4.5–11.5)

## 2025-05-27 PROCEDURE — 3700000001 HC ADD 15 MINUTES (ANESTHESIA): Performed by: STUDENT IN AN ORGANIZED HEALTH CARE EDUCATION/TRAINING PROGRAM

## 2025-05-27 PROCEDURE — 6370000000 HC RX 637 (ALT 250 FOR IP)

## 2025-05-27 PROCEDURE — 6360000002 HC RX W HCPCS

## 2025-05-27 PROCEDURE — 6360000002 HC RX W HCPCS: Performed by: STUDENT IN AN ORGANIZED HEALTH CARE EDUCATION/TRAINING PROGRAM

## 2025-05-27 PROCEDURE — 85025 COMPLETE CBC W/AUTO DIFF WBC: CPT

## 2025-05-27 PROCEDURE — 3600000015 HC SURGERY LEVEL 5 ADDTL 15MIN: Performed by: STUDENT IN AN ORGANIZED HEALTH CARE EDUCATION/TRAINING PROGRAM

## 2025-05-27 PROCEDURE — 7100000000 HC PACU RECOVERY - FIRST 15 MIN: Performed by: STUDENT IN AN ORGANIZED HEALTH CARE EDUCATION/TRAINING PROGRAM

## 2025-05-27 PROCEDURE — 2500000003 HC RX 250 WO HCPCS: Performed by: NURSE ANESTHETIST, CERTIFIED REGISTERED

## 2025-05-27 PROCEDURE — C1713 ANCHOR/SCREW BN/BN,TIS/BN: HCPCS | Performed by: STUDENT IN AN ORGANIZED HEALTH CARE EDUCATION/TRAINING PROGRAM

## 2025-05-27 PROCEDURE — 3600000005 HC SURGERY LEVEL 5 BASE: Performed by: STUDENT IN AN ORGANIZED HEALTH CARE EDUCATION/TRAINING PROGRAM

## 2025-05-27 PROCEDURE — 7100000001 HC PACU RECOVERY - ADDTL 15 MIN: Performed by: STUDENT IN AN ORGANIZED HEALTH CARE EDUCATION/TRAINING PROGRAM

## 2025-05-27 PROCEDURE — 2580000003 HC RX 258: Performed by: NURSE ANESTHETIST, CERTIFIED REGISTERED

## 2025-05-27 PROCEDURE — 2500000003 HC RX 250 WO HCPCS

## 2025-05-27 PROCEDURE — 1200000000 HC SEMI PRIVATE

## 2025-05-27 PROCEDURE — 0QWB04Z REVISION OF INTERNAL FIXATION DEVICE IN RIGHT LOWER FEMUR, OPEN APPROACH: ICD-10-PCS | Performed by: STUDENT IN AN ORGANIZED HEALTH CARE EDUCATION/TRAINING PROGRAM

## 2025-05-27 PROCEDURE — 36415 COLL VENOUS BLD VENIPUNCTURE: CPT

## 2025-05-27 PROCEDURE — 2709999900 HC NON-CHARGEABLE SUPPLY: Performed by: STUDENT IN AN ORGANIZED HEALTH CARE EDUCATION/TRAINING PROGRAM

## 2025-05-27 PROCEDURE — 80053 COMPREHEN METABOLIC PANEL: CPT

## 2025-05-27 PROCEDURE — 93010 ELECTROCARDIOGRAM REPORT: CPT | Performed by: INTERNAL MEDICINE

## 2025-05-27 PROCEDURE — 3700000000 HC ANESTHESIA ATTENDED CARE: Performed by: STUDENT IN AN ORGANIZED HEALTH CARE EDUCATION/TRAINING PROGRAM

## 2025-05-27 PROCEDURE — 6360000002 HC RX W HCPCS: Performed by: NURSE ANESTHETIST, CERTIFIED REGISTERED

## 2025-05-27 PROCEDURE — 82962 GLUCOSE BLOOD TEST: CPT

## 2025-05-27 DEVICE — IMPLANTABLE DEVICE: Type: IMPLANTABLE DEVICE | Site: LEG | Status: FUNCTIONAL

## 2025-05-27 DEVICE — SCREW OPTILINK VA LCKING SLF -TP T25 SD 5.0X70MM: Type: IMPLANTABLE DEVICE | Site: LEG | Status: FUNCTIONAL

## 2025-05-27 RX ORDER — MIDAZOLAM HYDROCHLORIDE 1 MG/ML
INJECTION, SOLUTION INTRAMUSCULAR; INTRAVENOUS
Status: DISCONTINUED | OUTPATIENT
Start: 2025-05-27 | End: 2025-05-27 | Stop reason: SDUPTHER

## 2025-05-27 RX ORDER — FENTANYL CITRATE 50 UG/ML
INJECTION, SOLUTION INTRAMUSCULAR; INTRAVENOUS
Status: DISCONTINUED | OUTPATIENT
Start: 2025-05-27 | End: 2025-05-27 | Stop reason: SDUPTHER

## 2025-05-27 RX ORDER — NALOXONE HYDROCHLORIDE 0.4 MG/ML
INJECTION, SOLUTION INTRAMUSCULAR; INTRAVENOUS; SUBCUTANEOUS PRN
Status: DISCONTINUED | OUTPATIENT
Start: 2025-05-27 | End: 2025-05-27 | Stop reason: HOSPADM

## 2025-05-27 RX ORDER — SODIUM CHLORIDE, SODIUM LACTATE, POTASSIUM CHLORIDE, CALCIUM CHLORIDE 600; 310; 30; 20 MG/100ML; MG/100ML; MG/100ML; MG/100ML
INJECTION, SOLUTION INTRAVENOUS
Status: DISCONTINUED | OUTPATIENT
Start: 2025-05-27 | End: 2025-05-27 | Stop reason: SDUPTHER

## 2025-05-27 RX ORDER — LABETALOL HYDROCHLORIDE 5 MG/ML
5 INJECTION, SOLUTION INTRAVENOUS
Status: DISCONTINUED | OUTPATIENT
Start: 2025-05-27 | End: 2025-05-27 | Stop reason: HOSPADM

## 2025-05-27 RX ORDER — BUPIVACAINE HYDROCHLORIDE 2.5 MG/ML
INJECTION, SOLUTION EPIDURAL; INFILTRATION; INTRACAUDAL; PERINEURAL PRN
Status: DISCONTINUED | OUTPATIENT
Start: 2025-05-27 | End: 2025-05-27 | Stop reason: ALTCHOICE

## 2025-05-27 RX ORDER — PROCHLORPERAZINE EDISYLATE 5 MG/ML
5 INJECTION INTRAMUSCULAR; INTRAVENOUS
Status: DISCONTINUED | OUTPATIENT
Start: 2025-05-27 | End: 2025-05-27 | Stop reason: HOSPADM

## 2025-05-27 RX ORDER — LIDOCAINE HYDROCHLORIDE 10 MG/ML
INJECTION, SOLUTION EPIDURAL; INFILTRATION; INTRACAUDAL; PERINEURAL PRN
Status: DISCONTINUED | OUTPATIENT
Start: 2025-05-27 | End: 2025-05-27 | Stop reason: ALTCHOICE

## 2025-05-27 RX ORDER — SODIUM CHLORIDE 0.9 % (FLUSH) 0.9 %
5-40 SYRINGE (ML) INJECTION PRN
Status: DISCONTINUED | OUTPATIENT
Start: 2025-05-27 | End: 2025-05-27 | Stop reason: HOSPADM

## 2025-05-27 RX ORDER — PROPOFOL 10 MG/ML
INJECTION, EMULSION INTRAVENOUS
Status: DISCONTINUED | OUTPATIENT
Start: 2025-05-27 | End: 2025-05-27 | Stop reason: SDUPTHER

## 2025-05-27 RX ORDER — CEFAZOLIN SODIUM 1 G/3ML
INJECTION, POWDER, FOR SOLUTION INTRAMUSCULAR; INTRAVENOUS
Status: DISCONTINUED | OUTPATIENT
Start: 2025-05-27 | End: 2025-05-27 | Stop reason: SDUPTHER

## 2025-05-27 RX ORDER — SODIUM CHLORIDE 9 MG/ML
INJECTION, SOLUTION INTRAVENOUS PRN
Status: DISCONTINUED | OUTPATIENT
Start: 2025-05-27 | End: 2025-05-27 | Stop reason: HOSPADM

## 2025-05-27 RX ORDER — DIPHENHYDRAMINE HYDROCHLORIDE 50 MG/ML
12.5 INJECTION, SOLUTION INTRAMUSCULAR; INTRAVENOUS
Status: DISCONTINUED | OUTPATIENT
Start: 2025-05-27 | End: 2025-05-27 | Stop reason: HOSPADM

## 2025-05-27 RX ORDER — HYDROMORPHONE HYDROCHLORIDE 1 MG/ML
0.5 INJECTION, SOLUTION INTRAMUSCULAR; INTRAVENOUS; SUBCUTANEOUS EVERY 5 MIN PRN
Status: DISCONTINUED | OUTPATIENT
Start: 2025-05-27 | End: 2025-05-27 | Stop reason: HOSPADM

## 2025-05-27 RX ORDER — SODIUM CHLORIDE 0.9 % (FLUSH) 0.9 %
5-40 SYRINGE (ML) INJECTION EVERY 12 HOURS SCHEDULED
Status: DISCONTINUED | OUTPATIENT
Start: 2025-05-27 | End: 2025-05-27 | Stop reason: HOSPADM

## 2025-05-27 RX ORDER — HYDROMORPHONE HYDROCHLORIDE 1 MG/ML
0.25 INJECTION, SOLUTION INTRAMUSCULAR; INTRAVENOUS; SUBCUTANEOUS EVERY 5 MIN PRN
Status: DISCONTINUED | OUTPATIENT
Start: 2025-05-27 | End: 2025-05-27 | Stop reason: HOSPADM

## 2025-05-27 RX ORDER — ONDANSETRON 2 MG/ML
4 INJECTION INTRAMUSCULAR; INTRAVENOUS
Status: DISCONTINUED | OUTPATIENT
Start: 2025-05-27 | End: 2025-05-27 | Stop reason: HOSPADM

## 2025-05-27 RX ORDER — DEXMEDETOMIDINE HYDROCHLORIDE 100 UG/ML
INJECTION, SOLUTION INTRAVENOUS
Status: DISCONTINUED | OUTPATIENT
Start: 2025-05-27 | End: 2025-05-27 | Stop reason: SDUPTHER

## 2025-05-27 RX ORDER — IPRATROPIUM BROMIDE AND ALBUTEROL SULFATE 2.5; .5 MG/3ML; MG/3ML
1 SOLUTION RESPIRATORY (INHALATION)
Status: DISCONTINUED | OUTPATIENT
Start: 2025-05-27 | End: 2025-05-27 | Stop reason: HOSPADM

## 2025-05-27 RX ADMIN — INSULIN LISPRO 2 UNITS: 100 INJECTION, SOLUTION INTRAVENOUS; SUBCUTANEOUS at 20:04

## 2025-05-27 RX ADMIN — SODIUM CHLORIDE, POTASSIUM CHLORIDE, SODIUM LACTATE AND CALCIUM CHLORIDE: 600; 310; 30; 20 INJECTION, SOLUTION INTRAVENOUS at 14:01

## 2025-05-27 RX ADMIN — ONDANSETRON 4 MG: 2 INJECTION, SOLUTION INTRAMUSCULAR; INTRAVENOUS at 04:54

## 2025-05-27 RX ADMIN — HYDROMORPHONE HYDROCHLORIDE 0.5 MG: 1 INJECTION, SOLUTION INTRAMUSCULAR; INTRAVENOUS; SUBCUTANEOUS at 15:18

## 2025-05-27 RX ADMIN — PHENYLEPHRINE HYDROCHLORIDE 50 MCG: 10 INJECTION INTRAVENOUS at 14:15

## 2025-05-27 RX ADMIN — OXYCODONE HYDROCHLORIDE 10 MG: 10 TABLET ORAL at 16:13

## 2025-05-27 RX ADMIN — MIDAZOLAM 2 MG: 1 INJECTION INTRAMUSCULAR; INTRAVENOUS at 14:03

## 2025-05-27 RX ADMIN — FENTANYL CITRATE 50 MCG: 50 INJECTION, SOLUTION INTRAMUSCULAR; INTRAVENOUS at 14:21

## 2025-05-27 RX ADMIN — PHENYLEPHRINE HYDROCHLORIDE 50 MCG: 10 INJECTION INTRAVENOUS at 14:29

## 2025-05-27 RX ADMIN — FENTANYL CITRATE 50 MCG: 50 INJECTION, SOLUTION INTRAMUSCULAR; INTRAVENOUS at 14:10

## 2025-05-27 RX ADMIN — WATER 2000 MG: 1 INJECTION INTRAMUSCULAR; INTRAVENOUS; SUBCUTANEOUS at 04:54

## 2025-05-27 RX ADMIN — DEXMEDETOMIDINE HCL 8 MCG: 100 INJECTION INTRAVENOUS at 14:13

## 2025-05-27 RX ADMIN — PROPOFOL 50 MCG/KG/MIN: 10 INJECTION, EMULSION INTRAVENOUS at 14:10

## 2025-05-27 RX ADMIN — SODIUM CHLORIDE, PRESERVATIVE FREE 10 ML: 5 INJECTION INTRAVENOUS at 10:12

## 2025-05-27 RX ADMIN — OXYCODONE HYDROCHLORIDE 10 MG: 10 TABLET ORAL at 04:54

## 2025-05-27 RX ADMIN — PHENYLEPHRINE HYDROCHLORIDE 50 MCG: 10 INJECTION INTRAVENOUS at 14:21

## 2025-05-27 RX ADMIN — HYDROMORPHONE HYDROCHLORIDE 0.5 MG: 1 INJECTION, SOLUTION INTRAMUSCULAR; INTRAVENOUS; SUBCUTANEOUS at 15:11

## 2025-05-27 RX ADMIN — CEFAZOLIN 2 G: 1 INJECTION, POWDER, FOR SOLUTION INTRAMUSCULAR; INTRAVENOUS at 14:12

## 2025-05-27 RX ADMIN — OXYCODONE HYDROCHLORIDE 10 MG: 10 TABLET ORAL at 20:04

## 2025-05-27 RX ADMIN — SODIUM CHLORIDE, PRESERVATIVE FREE 10 ML: 5 INJECTION INTRAVENOUS at 20:04

## 2025-05-27 RX ADMIN — PHENYLEPHRINE HYDROCHLORIDE 50 MCG: 10 INJECTION INTRAVENOUS at 14:36

## 2025-05-27 ASSESSMENT — PAIN DESCRIPTION - FREQUENCY
FREQUENCY: CONTINUOUS

## 2025-05-27 ASSESSMENT — PAIN - FUNCTIONAL ASSESSMENT
PAIN_FUNCTIONAL_ASSESSMENT: PREVENTS OR INTERFERES SOME ACTIVE ACTIVITIES AND ADLS
PAIN_FUNCTIONAL_ASSESSMENT: ACTIVITIES ARE NOT PREVENTED
PAIN_FUNCTIONAL_ASSESSMENT: PREVENTS OR INTERFERES SOME ACTIVE ACTIVITIES AND ADLS
PAIN_FUNCTIONAL_ASSESSMENT: PREVENTS OR INTERFERES SOME ACTIVE ACTIVITIES AND ADLS
PAIN_FUNCTIONAL_ASSESSMENT: 0-10
PAIN_FUNCTIONAL_ASSESSMENT: PREVENTS OR INTERFERES SOME ACTIVE ACTIVITIES AND ADLS
PAIN_FUNCTIONAL_ASSESSMENT: 0-10
PAIN_FUNCTIONAL_ASSESSMENT: 0-10

## 2025-05-27 ASSESSMENT — PAIN SCALES - GENERAL
PAINLEVEL_OUTOF10: 10
PAINLEVEL_OUTOF10: 4
PAINLEVEL_OUTOF10: 8
PAINLEVEL_OUTOF10: 7
PAINLEVEL_OUTOF10: 1
PAINLEVEL_OUTOF10: 2
PAINLEVEL_OUTOF10: 2
PAINLEVEL_OUTOF10: 9
PAINLEVEL_OUTOF10: 10

## 2025-05-27 ASSESSMENT — PAIN DESCRIPTION - PAIN TYPE
TYPE: SURGICAL PAIN

## 2025-05-27 ASSESSMENT — PAIN DESCRIPTION - LOCATION
LOCATION: LEG
LOCATION: KNEE
LOCATION: LEG

## 2025-05-27 ASSESSMENT — PAIN DESCRIPTION - ORIENTATION
ORIENTATION: RIGHT

## 2025-05-27 ASSESSMENT — PAIN DESCRIPTION - DESCRIPTORS
DESCRIPTORS: ACHING;BURNING;CRAMPING
DESCRIPTORS: ACHING;BURNING;SHARP
DESCRIPTORS: BURNING;ACHING;SORE
DESCRIPTORS: ACHING;BURNING;CRAMPING
DESCRIPTORS: ACHING;DISCOMFORT;SHARP
DESCRIPTORS: ACHING;DISCOMFORT;NAGGING
DESCRIPTORS: ACHING;BURNING;SHARP

## 2025-05-27 ASSESSMENT — PAIN DESCRIPTION - ONSET
ONSET: ON-GOING

## 2025-05-27 NOTE — PROGRESS NOTES
Date: 2025       Patient Name: Sravanthi Lr  : 1955      MRN: [id number]    PT held, pt to return to OR today. Will follow up as able.     Ramy Solorzano PT, DPT  [id number]

## 2025-05-27 NOTE — ANESTHESIA PRE PROCEDURE
Department of Anesthesiology  Preprocedure Note       Name:  Sravanthi Lr   Age:  69 y.o.  :  1955                                          MRN:  [id number]         Date:  2025      Surgeon: Surgeon(s):  Ramy Canales DO    Procedure: Procedure(s):  FEMUR OPEN REDUCTION INTERNAL FIXATION    Medications prior to admission:   Prior to Admission medications    Medication Sig Start Date End Date Taking? Authorizing Provider   acetaminophen (TYLENOL) 500 MG tablet Take 1 tablet by mouth 4 times daily as needed for Pain 25  Yes José Luis Dominguez DO   aspirin 325 MG EC tablet Take 1 tablet by mouth 2 times daily 25 Yes Star Pearson DO   oxyCODONE (ROXICODONE) 5 MG immediate release tablet Take 1 tablet by mouth every 6 hours as needed for Pain for up to 7 days. Intended supply: 7 days. Take lowest dose possible to manage pain Max Daily Amount: 20 mg 25 Yes Star Pearson DO   empagliflozin (JARDIANCE) 25 MG tablet Take 1 tablet by mouth daily 3/26/25  Yes Priya Pennington APRN - CNP   losartan (COZAAR) 100 MG tablet Take 1 tablet by mouth daily 3/26/25  Yes Priya Pennington APRN - CNP   bisoprolol-hydroCHLOROthiazide (ZIAC) 10-6.25 MG per tablet Take 1 tablet by mouth daily 3/26/25  Yes Priya Pennington APRN - CNP   Tirzepatide (MOUNJARO) 7.5 MG/0.5ML SOAJ Inject 7.5 mg into the skin once a week 3/26/25  Yes Priya Pennington APRN - CNP   colchicine (COLCRYS) 0.6 MG tablet Take 1 tablet by mouth 2 times daily  Patient taking differently: Take 1 tablet by mouth 2 times daily as needed 23  Yes Jean Choi DO   MOUNJARO 5 MG/0.5ML SOAJ pen INJECT 1/2 (ONE-HALF) ML SUBCUTANEOUSLY ONCE A WEEK  Patient not taking: Reported on 2025 3/10/25   Provider, MD Beti       Current medications:    Current Facility-Administered Medications   Medication Dose Route Frequency Provider Last Rate Last Admin   • sodium chloride flush 0.9 % injection 5-40 mL  5-40 mL

## 2025-05-27 NOTE — BRIEF OP NOTE
Brief Postoperative Note      Patient: Sravanthi Lr  YOB: 1955  MRN: [id number]    Date of Procedure: 5/27/2025    Pre-Op Diagnosis Codes:   Closed displaced right periprosthetic supracondylar distal femur fracture     Post-Op Diagnosis: Same       Procedure(s):  Revision open reduction internal fixation right distal femur fracture  Surgeon(s):  Ramy Canales DO    Assistant:  * No surgical staff found *    Anesthesia: General    Estimated Blood Loss (mL): Minimal    Complications: None    Specimens:   * No specimens in log *    Implants:  Implant Name Type Inv. Item Serial No.  Lot No. LRB No. Used Action   SCREW VA LCK OPTILINK S/T 5.0X22MM - [id number]  SCREW VA LCK OPTILINK S/T 5.0X22MM  DEPUY [employer]  Right 1 Implanted   SCREW OPTILINK VA LCKING SLF -TP T25 SD 5.0X70MM - [id number]  SCREW OPTILINK VA LCKING SLF -TP T25 SD 5.0X70MM  DEPUY [employer] [place]  Right 1 Implanted         Drains: * No LDAs found *    Findings:  Infection Present At Time Of Surgery (PATOS) (choose all levels that have infection present):  No infection present  Other Findings: Revision open reduction internal fixation distal femur fracture with removal of hardware, revision reduction, revision of distal screws    Electronically signed by Ramy Canales DO on 5/27/2025 at 2:33 PM

## 2025-05-27 NOTE — ANESTHESIA POSTPROCEDURE EVALUATION
Department of Anesthesiology  Postprocedure Note    Patient: Sravanthi Lr  MRN: [id number]  YOB: 1955  Date of evaluation: 5/27/2025    Procedure Summary       Date: 05/27/25 Room / Location: [address]    Anesthesia Start: 1401 Anesthesia Stop: 1455    Procedure: RIGHT DISTAL FEMUR HARDWARE REVISION (Right: Leg Upper) Diagnosis:       Closed bicondylar fracture of distal femur, right, initial encounter ([facility])      (Closed bicondylar fracture of distal femur, right, initial encounter ([facility]) [S72.421A, S72.431A])    Surgeons: Ramy Canales DO Responsible Provider: Florencio Mcdonald DO    Anesthesia Type: MAC ASA Status: 3            Anesthesia Type: MAC    Carly Phase I: Carly Score: 10    Carly Phase II:      Anesthesia Post Evaluation    Patient location during evaluation: PACU  Patient participation: complete - patient participated  Level of consciousness: awake  Pain score: 4 (burning at incision site)  Airway patency: patent  Nausea & Vomiting: no vomiting and no nausea  Cardiovascular status: blood pressure returned to baseline and hemodynamically stable  Respiratory status: nonlabored ventilation, room air, spontaneous ventilation and acceptable  Hydration status: stable  Multimodal analgesia pain management approach        No notable events documented.

## 2025-05-27 NOTE — OP NOTE
Operative Note      Patient: Sravanthi Lr  YOB: 1955  MRN: [id number]    Date of Procedure: 5/26/2025    Pre-Op Diagnosis Codes:   Closed displaced right periprosthetic supracondylar distal femur fracture     Post-Op Diagnosis: Same       Procedure(s):  Open reduction internal fixation right periprosthetic supracondylar femur fracture     Surgeon(s):  Ramy Canales DO    Assistant:   Resident: José Luis Dominguez DO; Star Pearson DO    Anesthesia: General    Estimated Blood Loss (mL): less than 50     Complications: None    Specimens:   * No specimens in log *    Implants:  Implant Name Type Inv. Item Serial No.  Lot No. LRB No. Used Action   PLATE BNE L230MM 10 H ST R CNDYL S STL CRV ANNEL COMPR JOSTIN - [id number]  PLATE BNE L230MM 10 H ST R CNDYL S STL CRV ANNEL COMPR JOSTIN  DEPUY [employer] [place]-  Right 1 Implanted   SCREW BNE L32MM DIA4.5MM PROX MARIO TIB S STL ST ANNEL FULL - [id number]  SCREW BNE L32MM DIA4.5MM PROX MARIO TIB S STL ST ANNEL FULL  DEPUY [employer] [place]-  Right 1 Implanted   SCREW BNE L36MM DIA4.5MM PROX MARIO TIB S STL ST ANNEL FULL - [id number]  SCREW BNE L36MM DIA4.5MM PROX MARIO TIB S STL ST ANNEL FULL  DEPUY [employer] [place]-  Right 2 Implanted   SCREW BNE L40MM DIA4.5MM PROX MARIO TIB S STL ST ANNEL FULL - [id number]  SCREW BNE L40MM DIA4.5MM PROX MARIO TIB S STL ST ANNEL FULL  DEPUY [employer] [place]-  Right 1 Implanted   SCREW VA LCK OPTILINK S/T 5.0X65MM - [id number]  SCREW VA LCK OPTILINK S/T 5.0X65MM  DEPUY [employer] [place]  Right 1 Implanted   SCREW OPTILINK VA LCKING SLF -TP T25 SD 5.0X70MM - [id number]  SCREW OPTILINK VA LCKING SLF -TP T25 SD 5.0X70MM  DEPUY [employer] [place]  Right 2 Implanted   SCREW VA LCK OPTILINK SLF TP 5.0X75MM - [id number]  SCREW VA LCK OPTILINK SLF TP 5.0X75MM  DEPUY [employer]-  Right 1 Implanted   SYSTEM FIXATION SCREOPTILINK 5.0 NASREEN SLF TPNG SD 80 - [id number]  SYSTEM FIXATION SCREOPTILINK 5.0 NASREEN SLF TPNG SD 80  DEPUY [employer] [facility]  Right 1 
right knee.  Patient was then rolled to the operating room carefully transferred supine to the OR table.  All bony prominences were well-padded.  Right leg was placed on a bone foam ramp.  Preop antibiotics were infused.  Monitored anesthesia care was induced.  The right lower extremities prepped and draped in standard sterile orthopedic fashion.  Appropriate timeout was performed confirming side and site of surgery.  I injected local anesthetic around her distal incision.  I removed about 5 cm of staples from the distal end of her wound.  I carefully dissected down the IT band which was split in line with our incision.  Retractors were placed in the distal end of the plate was identified.  Both distalmost locking screws anterior and posterior removed from the end of the plate.  At this point she still had about a 15 degree flexion contracture and could not fully extend.  I then revised our reduction extending the fracture through her intact hardware.  After extending the implant around our distal screws we had achieved full extension and flexion without any restrictions or mechanical block.  I then placed a 70 mm locking screw in our anterior most distal hole.  A unicortical 22 mm locking screw was placed in the posterior hole.  Final x-rays were taken AP and lateral of the knee demonstrating improved reduction in anatomic alignment of her distal femur fracture with appropriate hardware placement.  I then took the knee through a range of motion.  It fully extended and flexed to an appropriate 120 degrees without restriction.  The fracture was found to be stable.  This completed the procedure.  The wound was irrigated with sterile saline.  Meticulous hemostasis was noted.  IT band was closed again distally in a watertight fashion using #1 STRATAFIX suture.  The remainder of the distal wound was closed using 2-0 Monocryl and staples.  A sterile dressing was reapplied and she was placed in a hinged knee brace unlocked

## 2025-05-27 NOTE — PLAN OF CARE
Problem: Chronic Conditions and Co-morbidities  Goal: Patient's chronic conditions and co-morbidity symptoms are monitored and maintained or improved  Outcome: Progressing     Problem: Discharge Planning  Goal: Discharge to home or other facility with appropriate resources  Outcome: Progressing     Problem: Safety - Adult  Goal: Free from fall injury  Outcome: Progressing     Problem: ABCDS Injury Assessment  Goal: Absence of physical injury  Outcome: Progressing     Problem: Pain  Goal: Verbalizes/displays adequate comfort level or baseline comfort level  Outcome: Progressing     Problem: Skin/Tissue Integrity  Goal: Skin integrity remains intact  Description: 1.  Monitor for areas of redness and/or skin breakdown2.  Assess vascular access sites hourly3.  Every 4-6 hours minimum:  Change oxygen saturation probe site4.  Every 4-6 hours:  If on nasal continuous positive airway pressure, respiratory therapy assess nares and determine need for appliance change or resting period  Outcome: Progressing

## 2025-05-27 NOTE — PROGRESS NOTES
Department of Orthopedic Surgery  Resident Progress Note    Patient seen and examined. Pain appropriately controlled. No new complaints.  Denies chest pain, shortness of breath, dizziness/lightheadedness. Denies fever or chills.    VITALS:  /64   Pulse 87   Temp 98.7 °F (37.1 °C) (Temporal)   Resp 16   Ht 1.549 m (5' 1\")   Wt 61.2 kg (135 lb)   SpO2 97%   BMI 25.51 kg/m²     General: alert and oriented to person, place and time, well-developed and well-nourished, in no acute distress    MUSCULOSKELETAL:   right lower extremity:  Right knee in knee ROM brace.  Resting in gentle knee flexion.  Dressings clean/dry/intact.  Compartments soft and compressible  +PF/DF/EHL  +2/4 DP & PT pulses, Brisk Cap refill, Toes warm and perfused  Distal sensation grossly intact to Peroneals, Sural, Saphenous, and tibial nrs    CBC:   Lab Results   Component Value Date/Time    WBC 10.4 05/25/2025 03:23 PM    HGB 12.1 05/25/2025 03:23 PM    HCT 35.5 05/25/2025 03:23 PM     05/25/2025 03:23 PM     PT/INR:    Lab Results   Component Value Date/Time    PROTIME 12.4 05/26/2025 05:28 AM    INR 1.1 05/26/2025 05:28 AM         ASSESSMENT  S/P open reduction internal fixation of right supracondylar periprosthetic distal femur fracture-5/26/2025    PLAN      Tentative plan to return to the OR today for locking screw removal under light sedation and local anesthetic.  This was discussed with the patient as she is understanding and agreeable with the plan   N.p.o.   continue physical therapy and protocol: NWB -right LE  24 hour abx coverage should be completed today  Deep venous thrombosis prophylaxis -okay to resume tomorrow, early mobilization  PT/OT  Pain Control: IV and PO  Monitor H&H  Please reach out to orthopedic surgery with any questions or concerns    Electronically signed by José Luis Dominguez DO on 5/27/2025 at 6:02 AM  Note: This report was completed using [employer] voiced recognition software.  Every effort

## 2025-05-27 NOTE — PROGRESS NOTES
OCCUPATIONAL THERAPY TREATMENT NOTE    [facility]      OT BEDSIDE TREATMENT NOTE      Date:2025  Patient Name: Sravanthi Lr  MRN: [id number]  : 1955  Room: OR [place] /NONE     OT orders received & appreciated, chart reviewed.  Pt. on hold d/t OR this day.  Will follow post-op in accordance with Ortho orders/POC.    Thank you,  Carmina eL, OTR/L   License #  [id number]

## 2025-05-27 NOTE — CARE COORDINATION
5/27/2025social work transition of care planning  Pt is from home alone. Pt reported using cane prn. Pt pcp is Dr. Mcfarland and pharmacy is [facility] in [place]. Pt reported snf hx of [facility] and hhc hx of [facility]. Explained sw role in transition of care planning. Pt plan is snf. Sw will create list based on pt's preference of 3-5 miles of her home via careport. Pt for sx today.  The Plan for Transition of Care is related to the following treatment goals: snf    The Patient and/or patient representative  was provided with a choice of provider and agrees   with the discharge plan. [x] Yes [] No    Freedom of choice list was provided with basic dialogue that supports the patient's individualized plan of care/goals, treatment preferences and shares the quality data associated with the providers. [x] Yes [] No  Electronically signed by BARBARA Cherry on 5/27/2025 at 11:40 AM

## 2025-05-27 NOTE — PROGRESS NOTES
HOSPITALIST PROGRESS NOTES     ADMITTING DATE AND TIME: 5/26/2025  1:31 AM  had no chief complaint listed for this encounter.    ADMIT DX: Periprosthetic fracture around internal prosthetic right hip joint ([facility]) [M97.01XA]  Periprosthetic fracture around internal prosthetic right hip joint, initial encounter ([facility]) [M97.01XA]      SUBJECTIVE:  Patient is being followed for Periprosthetic fracture around internal prosthetic right hip joint ([facility]) [M97.01XA]  Periprosthetic fracture around internal prosthetic right hip joint, initial encounter ([facility]) [M97.01XA]     Patient was seen examined and evaluated  Recent lab results, charts and pertinent diagnostic imaging reviewed   Ancillary service notes reviewed   Anxious     Care reviewed with nursing staff, medical and surgical specialty care, primary care and the patient's family as available.   ROS: denies fever, chills, cp, sob, n/v, HA unless stated above.      sodium chloride flush  5-40 mL IntraVENous 2 times per day    [Held by provider] enoxaparin  40 mg SubCUTAneous Daily    insulin lispro  0-4 Units SubCUTAneous 4x Daily AC & HS     sodium chloride flush, 10 mL, PRN  sodium chloride, , PRN  potassium chloride, 40 mEq, PRN   Or  potassium alternative oral replacement, 40 mEq, PRN   Or  potassium chloride, 10 mEq, PRN  magnesium sulfate, 2,000 mg, PRN  ondansetron, 4 mg, Q8H PRN   Or  ondansetron, 4 mg, Q6H PRN  polyethylene glycol, 17 g, Daily PRN  acetaminophen, 650 mg, Q6H PRN   Or  acetaminophen, 650 mg, Q6H PRN  melatonin, 3 mg, Nightly PRN  oxyCODONE, 5 mg, Q4H PRN   Or  oxyCODONE, 10 mg, Q4H PRN  morphine, 2 mg, Q2H PRN   Or  morphine, 4 mg, Q2H PRN  glucose, 4 tablet, PRN  dextrose bolus, 125 mL, PRN   Or  dextrose bolus, 250 mL, PRN  glucagon (rDNA), 1 mg, PRN  dextrose, , Continuous PRN         OBJECTIVE:    BP (!) 103/54   Pulse

## 2025-05-27 NOTE — PROGRESS NOTES
Orthopedic surgery update:    Patient seen and examined after surgery.  She is unable to extend her knee past 15 degrees.  I did review the fluoroscopic images in detail.  I think that she is having impingement on her distal most posterior locking screw with a CAM of her polyethylene.  I explained that we need to take her to the operating room to remove that screw and make sure her motion improves.  If it does we will switch this with a short unicortical locking screw.  She understands and wishes to proceed.  Discussed the risk benefits and alternatives surgery.  She understands risk include but not limited to blood loss, blood, infection, damage to neurovascular structures, need for reoperation amongst others.  Informed consent was obtained and signed and placed in the chart.  My initials were placed on her right knee            Ramy Canales DO   Orthopaedic Surgery   5/27/2025  2:01 PM    Note: This report was completed using computerize voiced recognition software.  Every effort has been made to ensure accuracy; however, inadvertent computerized transcription errors may be present.

## 2025-05-28 LAB
ALBUMIN SERPL-MCNC: 3.2 G/DL (ref 3.5–5.2)
ALP SERPL-CCNC: 84 U/L (ref 35–104)
ALT SERPL-CCNC: 8 U/L (ref 0–35)
ANION GAP SERPL CALCULATED.3IONS-SCNC: 11 MMOL/L (ref 7–16)
ANION GAP SERPL CALCULATED.3IONS-SCNC: 12 MMOL/L (ref 7–16)
AST SERPL-CCNC: 28 U/L (ref 0–35)
BASOPHILS # BLD: 0.03 K/UL (ref 0–0.2)
BASOPHILS NFR BLD: 0 % (ref 0–2)
BILIRUB SERPL-MCNC: 0.6 MG/DL (ref 0–1.2)
BUN SERPL-MCNC: 37 MG/DL (ref 8–23)
BUN SERPL-MCNC: 42 MG/DL (ref 8–23)
CALCIUM SERPL-MCNC: 8.5 MG/DL (ref 8.8–10.2)
CALCIUM SERPL-MCNC: 8.5 MG/DL (ref 8.8–10.2)
CHLORIDE SERPL-SCNC: 106 MMOL/L (ref 98–107)
CHLORIDE SERPL-SCNC: 107 MMOL/L (ref 98–107)
CO2 SERPL-SCNC: 20 MMOL/L (ref 22–29)
CO2 SERPL-SCNC: 20 MMOL/L (ref 22–29)
CREAT SERPL-MCNC: 1.6 MG/DL (ref 0.5–1)
CREAT SERPL-MCNC: 1.9 MG/DL (ref 0.5–1)
EOSINOPHIL # BLD: 0.07 K/UL (ref 0.05–0.5)
EOSINOPHILS RELATIVE PERCENT: 1 % (ref 0–6)
ERYTHROCYTE [DISTWIDTH] IN BLOOD BY AUTOMATED COUNT: 12.3 % (ref 11.5–15)
GFR, ESTIMATED: 28 ML/MIN/1.73M2
GFR, ESTIMATED: 34 ML/MIN/1.73M2
GLUCOSE BLD-MCNC: 178 MG/DL (ref 74–99)
GLUCOSE BLD-MCNC: 239 MG/DL (ref 74–99)
GLUCOSE BLD-MCNC: 258 MG/DL (ref 74–99)
GLUCOSE BLD-MCNC: 266 MG/DL (ref 74–99)
GLUCOSE SERPL-MCNC: 185 MG/DL (ref 74–99)
GLUCOSE SERPL-MCNC: 237 MG/DL (ref 74–99)
HCT VFR BLD AUTO: 23.9 % (ref 34–48)
HGB BLD-MCNC: 8 G/DL (ref 11.5–15.5)
IMM GRANULOCYTES # BLD AUTO: 0.06 K/UL (ref 0–0.58)
IMM GRANULOCYTES NFR BLD: 1 % (ref 0–5)
LYMPHOCYTES NFR BLD: 1.39 K/UL (ref 1.5–4)
LYMPHOCYTES RELATIVE PERCENT: 18 % (ref 20–42)
MCH RBC QN AUTO: 30.8 PG (ref 26–35)
MCHC RBC AUTO-ENTMCNC: 33.5 G/DL (ref 32–34.5)
MCV RBC AUTO: 91.9 FL (ref 80–99.9)
MONOCYTES NFR BLD: 0.8 K/UL (ref 0.1–0.95)
MONOCYTES NFR BLD: 10 % (ref 2–12)
NEUTROPHILS NFR BLD: 70 % (ref 43–80)
NEUTS SEG NFR BLD: 5.51 K/UL (ref 1.8–7.3)
PLATELET # BLD AUTO: 159 K/UL (ref 130–450)
PMV BLD AUTO: 9.5 FL (ref 7–12)
POTASSIUM SERPL-SCNC: 4.2 MMOL/L (ref 3.5–5.1)
POTASSIUM SERPL-SCNC: 4.2 MMOL/L (ref 3.5–5.1)
PROT SERPL-MCNC: 5.7 G/DL (ref 6.4–8.3)
RBC # BLD AUTO: 2.6 M/UL (ref 3.5–5.5)
SODIUM SERPL-SCNC: 137 MMOL/L (ref 136–145)
SODIUM SERPL-SCNC: 138 MMOL/L (ref 136–145)
WBC OTHER # BLD: 7.9 K/UL (ref 4.5–11.5)

## 2025-05-28 PROCEDURE — 97530 THERAPEUTIC ACTIVITIES: CPT

## 2025-05-28 PROCEDURE — 97165 OT EVAL LOW COMPLEX 30 MIN: CPT

## 2025-05-28 PROCEDURE — 80048 BASIC METABOLIC PNL TOTAL CA: CPT

## 2025-05-28 PROCEDURE — 6370000000 HC RX 637 (ALT 250 FOR IP)

## 2025-05-28 PROCEDURE — 80053 COMPREHEN METABOLIC PANEL: CPT

## 2025-05-28 PROCEDURE — 82962 GLUCOSE BLOOD TEST: CPT

## 2025-05-28 PROCEDURE — 1200000000 HC SEMI PRIVATE

## 2025-05-28 PROCEDURE — 2580000003 HC RX 258

## 2025-05-28 PROCEDURE — 97161 PT EVAL LOW COMPLEX 20 MIN: CPT

## 2025-05-28 PROCEDURE — 36415 COLL VENOUS BLD VENIPUNCTURE: CPT

## 2025-05-28 PROCEDURE — 97535 SELF CARE MNGMENT TRAINING: CPT

## 2025-05-28 PROCEDURE — 2580000003 HC RX 258: Performed by: INTERNAL MEDICINE

## 2025-05-28 PROCEDURE — 85025 COMPLETE CBC W/AUTO DIFF WBC: CPT

## 2025-05-28 RX ORDER — ENOXAPARIN SODIUM 100 MG/ML
30 INJECTION SUBCUTANEOUS DAILY
Status: DISCONTINUED | OUTPATIENT
Start: 2025-05-29 | End: 2025-05-29 | Stop reason: HOSPADM

## 2025-05-28 RX ADMIN — INSULIN LISPRO 2 UNITS: 100 INJECTION, SOLUTION INTRAVENOUS; SUBCUTANEOUS at 20:38

## 2025-05-28 RX ADMIN — INSULIN LISPRO 1 UNITS: 100 INJECTION, SOLUTION INTRAVENOUS; SUBCUTANEOUS at 15:46

## 2025-05-28 RX ADMIN — OXYCODONE HYDROCHLORIDE 10 MG: 10 TABLET ORAL at 06:37

## 2025-05-28 RX ADMIN — INSULIN LISPRO 2 UNITS: 100 INJECTION, SOLUTION INTRAVENOUS; SUBCUTANEOUS at 10:57

## 2025-05-28 RX ADMIN — SODIUM CHLORIDE, SODIUM LACTATE, POTASSIUM CHLORIDE, AND CALCIUM CHLORIDE: .6; .31; .03; .02 INJECTION, SOLUTION INTRAVENOUS at 02:58

## 2025-05-28 RX ADMIN — SODIUM CHLORIDE, SODIUM LACTATE, POTASSIUM CHLORIDE, AND CALCIUM CHLORIDE: .6; .31; .03; .02 INJECTION, SOLUTION INTRAVENOUS at 15:51

## 2025-05-28 RX ADMIN — OXYCODONE HYDROCHLORIDE 10 MG: 10 TABLET ORAL at 10:56

## 2025-05-28 RX ADMIN — OXYCODONE HYDROCHLORIDE 10 MG: 10 TABLET ORAL at 15:47

## 2025-05-28 RX ADMIN — OXYCODONE HYDROCHLORIDE 10 MG: 10 TABLET ORAL at 02:58

## 2025-05-28 RX ADMIN — OXYCODONE HYDROCHLORIDE 10 MG: 10 TABLET ORAL at 20:37

## 2025-05-28 ASSESSMENT — PAIN DESCRIPTION - FREQUENCY
FREQUENCY: INTERMITTENT
FREQUENCY: INTERMITTENT

## 2025-05-28 ASSESSMENT — PAIN DESCRIPTION - LOCATION
LOCATION: LEG
LOCATION: LEG
LOCATION: LEG;KNEE
LOCATION: LEG
LOCATION: LEG

## 2025-05-28 ASSESSMENT — PAIN SCALES - GENERAL
PAINLEVEL_OUTOF10: 7
PAINLEVEL_OUTOF10: 5
PAINLEVEL_OUTOF10: 5
PAINLEVEL_OUTOF10: 2
PAINLEVEL_OUTOF10: 5
PAINLEVEL_OUTOF10: 7
PAINLEVEL_OUTOF10: 5
PAINLEVEL_OUTOF10: 7
PAINLEVEL_OUTOF10: 3

## 2025-05-28 ASSESSMENT — PAIN - FUNCTIONAL ASSESSMENT
PAIN_FUNCTIONAL_ASSESSMENT: ACTIVITIES ARE NOT PREVENTED
PAIN_FUNCTIONAL_ASSESSMENT: ACTIVITIES ARE NOT PREVENTED
PAIN_FUNCTIONAL_ASSESSMENT: PREVENTS OR INTERFERES SOME ACTIVE ACTIVITIES AND ADLS
PAIN_FUNCTIONAL_ASSESSMENT: PREVENTS OR INTERFERES SOME ACTIVE ACTIVITIES AND ADLS
PAIN_FUNCTIONAL_ASSESSMENT: ACTIVITIES ARE NOT PREVENTED

## 2025-05-28 ASSESSMENT — PAIN DESCRIPTION - ORIENTATION
ORIENTATION: RIGHT

## 2025-05-28 ASSESSMENT — PAIN DESCRIPTION - DESCRIPTORS
DESCRIPTORS: BURNING;SORE;DISCOMFORT
DESCRIPTORS: ACHING;THROBBING;BURNING
DESCRIPTORS: ACHING;BURNING;THROBBING
DESCRIPTORS: THROBBING;BURNING;SORE

## 2025-05-28 ASSESSMENT — PAIN DESCRIPTION - ONSET
ONSET: ON-GOING
ONSET: ON-GOING

## 2025-05-28 ASSESSMENT — PAIN DESCRIPTION - PAIN TYPE
TYPE: SURGICAL PAIN
TYPE: SURGICAL PAIN

## 2025-05-28 NOTE — PROGRESS NOTES
Department of Orthopedic Surgery  Resident Progress Note    Patient seen and examined. Pain appropriately controlled. No new complaints.  Denies chest pain, shortness of breath, dizziness/lightheadedness. Denies fever or chills.    VITALS:  /67   Pulse 91   Temp 98.6 °F (37 °C) (Temporal)   Resp 18   Ht 1.549 m (5' 1\")   Wt 61.2 kg (135 lb)   SpO2 96%   BMI 25.51 kg/m²     General: alert and oriented to person, place and time, well-developed and well-nourished, in no acute distress    MUSCULOSKELETAL:   right lower extremity:  Right knee in knee ROM brace.  Dressings clean/dry/intact.  Compartments soft and compressible  +PF/DF/EHL  +2/4 DP & PT pulses, Brisk Cap refill, Toes warm and perfused  Distal sensation grossly intact to Peroneals, Sural, Saphenous, and tibial nrs    CBC:   Lab Results   Component Value Date/Time    WBC 7.9 05/28/2025 05:04 AM    HGB 8.0 05/28/2025 05:04 AM    HCT 23.9 05/28/2025 05:04 AM     05/28/2025 05:04 AM     PT/INR:    Lab Results   Component Value Date/Time    PROTIME 12.4 05/26/2025 05:28 AM    INR 1.1 05/26/2025 05:28 AM         ASSESSMENT  S/P open reduction internal fixation of right supracondylar periprosthetic distal femur fracture 5/26 with revision of 2 distal screws on 5/27    PLAN      Nonweightbearing right lower extremity  PT/OT  24-hour antibiotic coverage to be completed today  Pain Control: IV and PO  Monitor H&H  Discharge planning: Pending PT/OT    Electronically signed by Joe Pisano DO on 5/28/2025 at 7:00 AM  Note: This report was completed using computerize voiced recognition software.  Every effort has been made to ensure accuracy; however, inadvertent computerized transcription errors may be present.

## 2025-05-28 NOTE — CARE COORDINATION
5/28/2025social work transition of care planning  Sw followed up with pt for javier choices:1) [facility] and 2) [facility]. Referrals sent via careport.  Electronically signed by BARBARA Cherry on 5/28/2025 at 9:33 AM    Addendum:[facility] notified [facility]. Sw completed [facility] and envelope. They will have a bed tomorrow.  Electronically signed by BARBARA Cherry on 5/28/2025 at 12:38 PM

## 2025-05-28 NOTE — DISCHARGE INSTR - COC
Continuity of Care Form    Patient Name: Sravanthi Lr   :  1955  MRN:  [id number]    Admit date:  2025  Discharge date:  2025    Code Status Order: Full Code   Advance Directives:    Date/Time Healthcare Directive Type of Healthcare Directive Copy in Chart Healthcare Agent Appointed Healthcare Agent's Name Healthcare Agent's Phone Number    25 1352 No, patient does not have an advance directive for healthcare treatment  --  --  --  --  --     25 0248 No, patient does not have an advance directive for healthcare treatment  --  --  --  --  --     25 0608 No, patient does not have an advance directive for healthcare treatment  --  --  --  --  --             Admitting Physician:  Dane Esparza MD  PCP: Pascual Mcfarland DO    Discharging Nurse:   Discharging Hospital Unit/Room#: 5415/5415-B  Discharging Unit Phone Number: [phone]    Emergency Contact:   Extended Emergency Contact Information  Primary Emergency Contact: Bev Hernandez  Home Phone: [phone]  Mobile Phone: [phone]  Relation: Neighbor  Secondary Emergency Contact: Cassandra Main  Mobile Phone: [phone]  Relation: Other Relative  Preferred language: English    Past Surgical History:  Past Surgical History:   Procedure Laterality Date    APPENDECTOMY      CARPAL TUNNEL RELEASE      COLONOSCOPY N/A 2019    COLONOSCOPY DIAGNOSTIC performed by VARINDER Khan MD at [facility] ENDOSCOPY    DEBRIDEMENT Left 3/11/2024    CYSTOSCOPY RETROGRADE PYELOGRAM LEFT STENT INSERTION performed by Shawn Boykin MD at [facility] OR    FEMUR SURGERY Right 2025    FEMUR OPEN REDUCTION INTERNAL FIXATION performed by Ramy Canales DO at [facility] OR    FEMUR SURGERY Right 2025    RIGHT DISTAL FEMUR HARDWARE REVISION performed by Ramy Canales DO at [facility] OR    FRACTURE SURGERY      rt ankle    KNEE SURGERY      arthroscopic    UPPER GASTROINTESTINAL ENDOSCOPY N/A 2019    EGD CONTROL HEMORRHAGE performed by

## 2025-05-28 NOTE — PROGRESS NOTES
OCCUPATIONAL THERAPY INITIAL EVALUATION    [facility]   [address] [place], [place]       Date:2025                                                  Patient Name: Sravanthi rL  MRN: [id number]  : 1955  Room: [address] [place] [address]    Evaluating OT: Amy Mckeon, OTD, OTR/L; [id number]    Occupational therapy physician order:  Start   Ordering Provider    25  OT eval and treat  Start:  25,   End:  25,   ONE TIME,   Standing Count:  1 Occurrences,   R        Order went unreviewed at transfer on Mon May 26, 2025  3:39 PM    Sara David APRN - CNP         Pt presents to ED with fall and subsequent periprosthetic fx, managed operatively     Diagnosis:   1. 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    Generalized osteoarthritis    Stage 3a chronic kidney disease ([facility])    Kidney stone    DKA, type 1, not at goal ([facility])    Periprosthetic fracture around internal prosthetic right hip joint, initial encounter ([facility])       Pertinent Medical History:   Past Medical History:   Diagnosis Date    Diabetes mellitus ([facility])     poorly controlled    Hypertension         Surgery/Procedures: 25 Open reduction internal fixation right periprosthetic supracondylar femur fracture, 25 Revision open reduction internal fixation right distal femur fracture       Recommended adaptive equipment: TBD     Precautions:  Fall Risk, NWB RLE with hinged ROM brace, unlocked    Assessment of current deficits    [x] Functional mobility  [x]ADLs  [x] Strength               [x]Cognition    [x] Functional transfers   [x] IADLs         [x] Safety Awareness   [x]Endurance    [x] Fine Coordination              [x] Balance      [] Vision/perception

## 2025-05-28 NOTE — PLAN OF CARE
Problem: Chronic Conditions and Co-morbidities  Goal: Patient's chronic conditions and co-morbidity symptoms are monitored and maintained or improved  5/27/2025 2100 by Yenny Whiting RN  Outcome: Progressing     Problem: Discharge Planning  Goal: Discharge to home or other facility with appropriate resources  5/27/2025 2100 by Yenny Whiting RN  Outcome: Progressing     Problem: Safety - Adult  Goal: Free from fall injury  5/27/2025 2100 by Yenny Whiting RN  Outcome: Progressing     Problem: ABCDS Injury Assessment  Goal: Absence of physical injury  5/27/2025 2100 by Yenny Whiting RN  Outcome: Progressing     Problem: Pain  Goal: Verbalizes/displays adequate comfort level or baseline comfort level  5/27/2025 2100 by Yenny Whiting RN  Outcome: Progressing

## 2025-05-28 NOTE — PROGRESS NOTES
HOSPITALIST PROGRESS NOTES     ADMITTING DATE AND TIME: 5/26/2025  1:31 AM  had no chief complaint listed for this encounter.    ADMIT DX: Periprosthetic fracture around internal prosthetic right hip joint ([facility]) [M97.01XA]  Periprosthetic fracture around internal prosthetic right hip joint, initial encounter ([facility]) [M97.01XA]      SUBJECTIVE:  Patient is being followed for Periprosthetic fracture around internal prosthetic right hip joint ([facility]) [M97.01XA]  Periprosthetic fracture around internal prosthetic right hip joint, initial encounter ([facility]) [M97.01XA]     Patient was seen examined and evaluated  Recent lab results, charts and pertinent diagnostic imaging reviewed   Ancillary service notes reviewed   Anxious     Care reviewed with nursing staff, medical and surgical specialty care, primary care and the patient's family as available.   ROS: denies fever, chills, cp, sob, n/v, HA unless stated above.      sodium chloride flush  5-40 mL IntraVENous 2 times per day    [Held by provider] enoxaparin  40 mg SubCUTAneous Daily    insulin lispro  0-4 Units SubCUTAneous 4x Daily AC & HS     sodium chloride flush, 10 mL, PRN  sodium chloride, , PRN  potassium chloride, 40 mEq, PRN   Or  potassium alternative oral replacement, 40 mEq, PRN   Or  potassium chloride, 10 mEq, PRN  magnesium sulfate, 2,000 mg, PRN  ondansetron, 4 mg, Q8H PRN   Or  ondansetron, 4 mg, Q6H PRN  polyethylene glycol, 17 g, Daily PRN  acetaminophen, 650 mg, Q6H PRN   Or  acetaminophen, 650 mg, Q6H PRN  melatonin, 3 mg, Nightly PRN  oxyCODONE, 5 mg, Q4H PRN   Or  oxyCODONE, 10 mg, Q4H PRN  morphine, 2 mg, Q2H PRN   Or  morphine, 4 mg, Q2H PRN  glucose, 4 tablet, PRN  dextrose bolus, 125 mL, PRN   Or  dextrose bolus, 250 mL, PRN  glucagon (rDNA), 1 mg, PRN  dextrose, , Continuous PRN         OBJECTIVE:    /64   Pulse 96

## 2025-05-28 NOTE — PROGRESS NOTES
CLINICAL PHARMACY NOTE: MEDS TO BEDS    Total # of Prescriptions Filled: 0   The following medications were delivered to the patient:  cancelled    Additional Documentation: cancelled meds  - patient going to [facility]

## 2025-05-28 NOTE — PROGRESS NOTES
Physical Therapy  Physical Therapy Initial Assessment    Name: Sravanthi Lr  : 1955  MRN: [id number]      Date of Service: 2025    Evaluating PT:  Willian Emanuel PT, DPT [id number]    Room #:  5415/5415-B  Diagnosis:  Periprosthetic fracture around internal prosthetic right hip joint ([facility]) [M97.01XA]  Periprosthetic fracture around internal prosthetic right hip joint, initial encounter ([facility]) [M97.01XA]  PMHx/PSHx:   has a past medical history of Diabetes mellitus ([facility]) and Hypertension.   has a past surgical history that includes knee surgery; fracture surgery; Carpal tunnel release; Appendectomy; Upper gastrointestinal endoscopy (N/A, 2019); Upper gastrointestinal endoscopy (N/A, 2019); Colonoscopy (N/A, 2019); Wound debridement (Left, 3/11/2024); Femur Surgery (Right, 2025); and Femur Surgery (Right, 2025).  Procedure/Surgery:  Open reduction internal fixation right periprosthetic supracondylar femur fracture (2025); Revision open reduction internal fixation right distal femur fracture (2025)  Precautions:  Falls, NWB RLE, R hinged knee brace (unlocked)  Equipment Owned:  SPC, walker  Equipment Needs:  TBD    SUBJECTIVE:    Pt lives alone in a multi-story home with 3 stair(s) to enter and 0 rail(s).  Can stay on 1st floor.  Pt ambulated with SPC as needed prior to admission.    OBJECTIVE:   Initial Evaluation  Date: 2025 Treatment Short Term/ Long Term   Goals   AM-PAC 6 Clicks      Was pt agreeable to Eval/treatment? Yes     Does pt have pain? 7/10 RLE     Bed Mobility  Rolling: NT  Supine to sit: Jacquelin  Sit to supine: Jacquelin  Scooting: NT  Rolling: Independent  Supine to sit: Independent  Sit to supine: Independent  Scooting: Independent   Transfers Sit to stand: ModA  Stand to sit: ModA  Stand pivot: NT  Sit to stand: SBA  Stand to sit: SBA  Stand pivot: SBA with AAD   Ambulation    Couple feet laterally with WW ModA  25 feet with AAD SBA   Stair

## 2025-05-28 NOTE — PROGRESS NOTES
Patient received the Sacrament of the Anointing of the Sick by Father Shane Montelongo on Tuesday, May 27, 2025.    If additional support is requested or needed please reach out to Spiritual Health ([id number]).    Chap. Pedro Farias MDIV, BCC

## 2025-05-29 VITALS
TEMPERATURE: 98.7 F | HEART RATE: 95 BPM | SYSTOLIC BLOOD PRESSURE: 118 MMHG | HEIGHT: 61 IN | DIASTOLIC BLOOD PRESSURE: 62 MMHG | BODY MASS INDEX: 25.49 KG/M2 | OXYGEN SATURATION: 98 % | RESPIRATION RATE: 18 BRPM | WEIGHT: 135 LBS

## 2025-05-29 LAB
BASOPHILS # BLD: 0.03 K/UL (ref 0–0.2)
BASOPHILS NFR BLD: 1 % (ref 0–2)
EOSINOPHIL # BLD: 0.17 K/UL (ref 0.05–0.5)
EOSINOPHILS RELATIVE PERCENT: 3 % (ref 0–6)
ERYTHROCYTE [DISTWIDTH] IN BLOOD BY AUTOMATED COUNT: 12.5 % (ref 11.5–15)
GLUCOSE BLD-MCNC: 171 MG/DL (ref 74–99)
GLUCOSE BLD-MCNC: 171 MG/DL (ref 74–99)
HCT VFR BLD AUTO: 23.5 % (ref 34–48)
HGB BLD-MCNC: 7.7 G/DL (ref 11.5–15.5)
IMM GRANULOCYTES # BLD AUTO: 0.04 K/UL (ref 0–0.58)
IMM GRANULOCYTES NFR BLD: 1 % (ref 0–5)
LYMPHOCYTES NFR BLD: 1.16 K/UL (ref 1.5–4)
LYMPHOCYTES RELATIVE PERCENT: 19 % (ref 20–42)
MCH RBC QN AUTO: 30.7 PG (ref 26–35)
MCHC RBC AUTO-ENTMCNC: 32.8 G/DL (ref 32–34.5)
MCV RBC AUTO: 93.6 FL (ref 80–99.9)
MONOCYTES NFR BLD: 0.6 K/UL (ref 0.1–0.95)
MONOCYTES NFR BLD: 10 % (ref 2–12)
NEUTROPHILS NFR BLD: 68 % (ref 43–80)
NEUTS SEG NFR BLD: 4.19 K/UL (ref 1.8–7.3)
PLATELET # BLD AUTO: 144 K/UL (ref 130–450)
PMV BLD AUTO: 9.4 FL (ref 7–12)
RBC # BLD AUTO: 2.51 M/UL (ref 3.5–5.5)
WBC OTHER # BLD: 6.2 K/UL (ref 4.5–11.5)

## 2025-05-29 PROCEDURE — 36415 COLL VENOUS BLD VENIPUNCTURE: CPT

## 2025-05-29 PROCEDURE — 82962 GLUCOSE BLOOD TEST: CPT

## 2025-05-29 PROCEDURE — 2580000003 HC RX 258: Performed by: INTERNAL MEDICINE

## 2025-05-29 PROCEDURE — 97530 THERAPEUTIC ACTIVITIES: CPT

## 2025-05-29 PROCEDURE — 6370000000 HC RX 637 (ALT 250 FOR IP)

## 2025-05-29 PROCEDURE — 2500000003 HC RX 250 WO HCPCS

## 2025-05-29 PROCEDURE — 97535 SELF CARE MNGMENT TRAINING: CPT

## 2025-05-29 PROCEDURE — 85025 COMPLETE CBC W/AUTO DIFF WBC: CPT

## 2025-05-29 RX ADMIN — SODIUM CHLORIDE, PRESERVATIVE FREE 10 ML: 5 INJECTION INTRAVENOUS at 08:26

## 2025-05-29 RX ADMIN — OXYCODONE 5 MG: 5 TABLET ORAL at 08:25

## 2025-05-29 RX ADMIN — SODIUM CHLORIDE, SODIUM LACTATE, POTASSIUM CHLORIDE, AND CALCIUM CHLORIDE: .6; .31; .03; .02 INJECTION, SOLUTION INTRAVENOUS at 00:40

## 2025-05-29 ASSESSMENT — PAIN DESCRIPTION - ORIENTATION: ORIENTATION: RIGHT

## 2025-05-29 ASSESSMENT — PAIN DESCRIPTION - ONSET: ONSET: GRADUAL

## 2025-05-29 ASSESSMENT — PAIN - FUNCTIONAL ASSESSMENT: PAIN_FUNCTIONAL_ASSESSMENT: PREVENTS OR INTERFERES SOME ACTIVE ACTIVITIES AND ADLS

## 2025-05-29 ASSESSMENT — PAIN SCALES - GENERAL: PAINLEVEL_OUTOF10: 6

## 2025-05-29 ASSESSMENT — PAIN DESCRIPTION - PAIN TYPE: TYPE: SURGICAL PAIN

## 2025-05-29 ASSESSMENT — PAIN DESCRIPTION - FREQUENCY: FREQUENCY: INTERMITTENT

## 2025-05-29 ASSESSMENT — PAIN DESCRIPTION - LOCATION: LOCATION: LEG

## 2025-05-29 ASSESSMENT — PAIN DESCRIPTION - DESCRIPTORS: DESCRIPTORS: ACHING;SORE;THROBBING

## 2025-05-29 NOTE — PROGRESS NOTES
Nurse to nurse report called to [facility], report given to Sohnda. All questions and concerns addressed with nurse receiving report. Shonda had no further questions for this nurse. Pt Iv removed without complication. Pt left unit with [facility] ambulance to transport to facility

## 2025-05-29 NOTE — PLAN OF CARE
Problem: Chronic Conditions and Co-morbidities  Goal: Patient's chronic conditions and co-morbidity symptoms are monitored and maintained or improved  5/29/2025 1108 by Ingrid Sellers RN  Outcome: Progressing  5/28/2025 2110 by Yenny Whiting RN  Outcome: Progressing     Problem: Discharge Planning  Goal: Discharge to home or other facility with appropriate resources  5/29/2025 1108 by Ingrid Sellers RN  Outcome: Progressing  5/28/2025 2110 by Yenny Whiting RN  Outcome: Progressing     Problem: Safety - Adult  Goal: Free from fall injury  5/29/2025 1108 by Ingrid Sellers RN  Outcome: Progressing  5/28/2025 2110 by Yenny Whiting RN  Outcome: Progressing     Problem: ABCDS Injury Assessment  Goal: Absence of physical injury  5/29/2025 1108 by Ingrid Sellers RN  Outcome: Progressing  5/28/2025 2110 by Yenny Whiting RN  Outcome: Progressing     Problem: Pain  Goal: Verbalizes/displays adequate comfort level or baseline comfort level  Outcome: Progressing     Problem: Skin/Tissue Integrity  Goal: Skin integrity remains intact  Description: 1.  Monitor for areas of redness and/or skin breakdown2.  Assess vascular access sites hourly3.  Every 4-6 hours minimum:  Change oxygen saturation probe site4.  Every 4-6 hours:  If on nasal continuous positive airway pressure, respiratory therapy assess nares and determine need for appliance change or resting period  Outcome: Progressing

## 2025-05-29 NOTE — CARE COORDINATION
5/29/2025social work transition of care planning  Pt plan is to [facility],once medically stable. They can take today.  Electronically signed by BARBARA Cherry on 5/29/2025 at 7:41 AM    Addendum: Dc order noted. [facility] for noon . Shireen notified pt at bedside,rn , and facility rep.  Electronically signed by BARBARA Cherry on 5/29/2025 at 10:52 AM

## 2025-05-29 NOTE — PLAN OF CARE
Problem: Chronic Conditions and Co-morbidities  Goal: Patient's chronic conditions and co-morbidity symptoms are monitored and maintained or improved  5/28/2025 2110 by Yenny Whiting RN  Outcome: Progressing     Problem: Discharge Planning  Goal: Discharge to home or other facility with appropriate resources  5/28/2025 2110 by Yenny Whiting RN  Outcome: Progressing     Problem: Safety - Adult  Goal: Free from fall injury  5/28/2025 2110 by Yenny Whiting RN  Outcome: Progressing     Problem: ABCDS Injury Assessment  Goal: Absence of physical injury  5/28/2025 2110 by Yenny Whiting RN  Outcome: Progressing

## 2025-05-29 NOTE — DISCHARGE SUMMARY
[facility] Hospitalist Physician Discharge Summary       Ramy Canales, DO  [address]  [address]  [facility] [address]  [phone]    Follow up in 2 week(s)  Post operative follow up, For suture removal    Frank at [facility]  [address] [id number]  [phone]        Pascual Mcfarland DO  [address] [facility] [address]  [phone]    Schedule an appointment as soon as possible for a visit  Hospital followup      Activity level: As tolerated     Dispo: Home      Condition on discharge: Stable     Patient ID:  Sravanthi Lr  [id number]  69 y.o.  1955    Patient's PCP: Pascual Mcfarland DO    Admit Date: 5/26/2025     Discharge Date: 5/29/25      Admitting Physician: Dane Esparza MD     Discharge Physician: Geronimo Sinha MD     Admission Diagnoses: Principal Problem:    Periprosthetic fracture around internal prosthetic right hip joint, initial encounter ([facility])  Resolved Problems:    * No resolved hospital problems. *      Discharge Diagnoses: Principal Problem:    Periprosthetic fracture around internal prosthetic right hip joint, initial encounter ([facility])  Resolved Problems:    * No resolved hospital problems. *      Consults:  IP CONSULT TO ORTHOPEDIC SURGERY  INPATIENT CONSULT TO ORTHOTIST/PROSTHETIST    Hospital Course:   Patient Sravanthi Lr is a 69 y.o. presented with Periprosthetic fracture around internal prosthetic right hip joint ([facility]) [M97.01XA]  Periprosthetic fracture around internal prosthetic right hip joint, initial encounter ([facility]) [M97.01XA]    69 y.o. year old female with Diabetes mellitus and Hypertension presetned to the ED after a mechanical fall. She was at the [employer], tripped and fell down to the right side landing on her right knee. Denies hitting her head or loss of consciousness.  Diagnostic workup in the ED XR right femur-periprosthetic fracture of the distal femur with posterior displacement. CT right

## 2025-05-29 NOTE — PROGRESS NOTES
Occupational Therapy  OT BEDSIDE TREATMENT NOTE   [facility]  [address], [place]       Date:2025  Patient Name: Sravanthi Lr  MRN: [id number]  : 1955  Room: [address] [place] [address]     Per OT Eval:  Evaluating OT: Amy Mckeon, LATRELLD, OTR/L; [id number]     Occupational therapy physician order:  Start     Ordering Provider     25   OT eval and treat  Start:  25,   End:  25,   ONE TIME,   Standing Count:  1 Occurrences,   R        Order went unreviewed at transfer on Mon May 26, 2025  3:39 PM    Sara David APRN - CNP                                           Pt presents to ED with fall and subsequent periprosthetic fx, managed operatively      Diagnosis:   1. 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    Generalized osteoarthritis    Stage 3a chronic kidney disease (HCC)    Kidney stone    DKA, type 1, not at goal ([facility])    Periprosthetic fracture around internal prosthetic right hip joint, initial encounter ([facility])            Pertinent Medical History:   Past Medical History        Past Medical History:   Diagnosis Date    Diabetes mellitus ([facility])       poorly controlled    Hypertension              Surgery/Procedures: 25 Open reduction internal fixation right periprosthetic supracondylar femur fracture, 25 Revision open reduction internal fixation right distal femur fracture        Recommended adaptive equipment: TBD      Precautions:  Fall Risk, NWB RLE with hinged ROM brace, unlocked     Assessment of current deficits    [x] Functional mobility            [x]ADLs           [x] Strength                  [x]Cognition    [x] Functional transfers          [x] IADLs         [x] Safety

## 2025-05-29 NOTE — PROGRESS NOTES
Department of Orthopedic Surgery  Resident Progress Note    Patient seen and examined.  Doing well this morning, no complaints.    VITALS:  /65   Pulse 91   Temp 98.4 °F (36.9 °C) (Temporal)   Resp 17   Ht 1.549 m (5' 1\")   Wt 61.2 kg (135 lb)   SpO2 97%   BMI 25.51 kg/m²     General: alert and oriented to person, place and time, well-developed and well-nourished, in no acute distress    MUSCULOSKELETAL:   right lower extremity:  Right knee in knee ROM brace.  Dressings clean/dry/intact.  Compartments soft and compressible  +PF/DF/EHL  +2/4 DP & PT pulses, Brisk Cap refill, Toes warm and perfused  Distal sensation grossly intact to Peroneals, Sural, Saphenous, and tibial nrs    CBC:   Lab Results   Component Value Date/Time    WBC 6.2 05/29/2025 04:49 AM    HGB 7.7 05/29/2025 04:49 AM    HCT 23.5 05/29/2025 04:49 AM     05/29/2025 04:49 AM     PT/INR:    Lab Results   Component Value Date/Time    PROTIME 12.4 05/26/2025 05:28 AM    INR 1.1 05/26/2025 05:28 AM         ASSESSMENT  S/P open reduction internal fixation of right supracondylar periprosthetic distal femur fracture 5/26 with revision of 2 distal screws on 5/27    PLAN      Nonweightbearing right lower extremity  PT/OT  24-hour antibiotic coverage to be completed today  Pain Control: IV and PO  Monitor H&H, 7.7 this morning  Discharge planning: Pending PT/OT    Electronically signed by Joe Pisano DO on 5/29/2025 at 6:50 AM  Note: This report was completed using Neurolink voiced recognition software.  Every effort has been made to ensure accuracy; however, inadvertent computerized transcription errors may be present.

## 2025-05-30 ENCOUNTER — TELEPHONE (OUTPATIENT)
Dept: ORTHOPEDIC SURGERY | Age: 70
End: 2025-05-30

## 2025-05-30 NOTE — TELEPHONE ENCOUNTER
Spoke with Nicole at [place] regarding instructions for dressing  Clean dry dressing changes daily is fine. Her incision is lateral so it'll rub if not.   Nicole will relay message to Rashawn

## 2025-05-30 NOTE — TELEPHONE ENCOUNTER
Procedure(s):  Revision open reduction internal fixation right distal femur fracture   Date:  5/27/2025    Rashawn from [facility] called. Patient admitted.  Need orders regarding dressing / ACE Wrap and Immobilizer / Hinged Knee Brace       AVS states keep dressing clean and dry x 7 days.  Appt 6/11/2025.  What do you want with regards to dressing after day 7

## 2025-05-31 ENCOUNTER — OUTSIDE SERVICES (OUTPATIENT)
Dept: PRIMARY CARE CLINIC | Age: 70
End: 2025-05-31

## 2025-05-31 DIAGNOSIS — Z91.81 AT MAXIMUM RISK FOR FALL: ICD-10-CM

## 2025-05-31 DIAGNOSIS — M97.01XA PERIPROSTHETIC FRACTURE AROUND INTERNAL PROSTHETIC RIGHT HIP JOINT, INITIAL ENCOUNTER (HCC): ICD-10-CM
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
## 2025-05-31 DIAGNOSIS — R53.1 GENERALIZED WEAKNESS: ICD-10-CM

## 2025-05-31 DIAGNOSIS — R53.81 PHYSICAL DECONDITIONING: ICD-10-CM

## 2025-05-31 DIAGNOSIS — Z98.890 S/P ORIF (OPEN REDUCTION INTERNAL FIXATION) FRACTURE: ICD-10-CM

## 2025-05-31 DIAGNOSIS — M1A.39X1 CHRONIC GOUT DUE TO RENAL IMPAIRMENT OF MULTIPLE SITES WITH TOPHUS: ICD-10-CM

## 2025-05-31 DIAGNOSIS — N18.31 STAGE 3A CHRONIC KIDNEY DISEASE (HCC): ICD-10-CM

## 2025-05-31 DIAGNOSIS — Z87.81 S/P ORIF (OPEN REDUCTION INTERNAL FIXATION) FRACTURE: ICD-10-CM

## 2025-05-31 DIAGNOSIS — E11.65 TYPE 2 DIABETES MELLITUS WITH HYPERGLYCEMIA, WITHOUT LONG-TERM CURRENT USE OF INSULIN (HCC): ICD-10-CM

## 2025-05-31 NOTE — PROGRESS NOTES
Sravanthi Lr (:  1955) is a 69 y.o. female.    Subjective   SUBJECTIVE/OBJECTIVE:  Past Medical History:   Diagnosis Date    Diabetes mellitus (HCC)     poorly controlled    Hypertension       Past Surgical History:   Procedure Laterality Date    APPENDECTOMY      CARPAL TUNNEL RELEASE      COLONOSCOPY N/A 2019    COLONOSCOPY DIAGNOSTIC performed by VARINDER Khan MD at [facility] ENDOSCOPY    DEBRIDEMENT Left 3/11/2024    CYSTOSCOPY RETROGRADE PYELOGRAM LEFT STENT INSERTION performed by Shawn Boykin MD at [facility] OR    FEMUR SURGERY Right 2025    FEMUR OPEN REDUCTION INTERNAL FIXATION performed by Ramy Canales DO at [facility] OR    FEMUR SURGERY Right 2025    RIGHT DISTAL FEMUR HARDWARE REVISION performed by Ramy Canales DO at [facility] OR    FRACTURE SURGERY      rt ankle    KNEE SURGERY      arthroscopic    UPPER GASTROINTESTINAL ENDOSCOPY N/A 2019    EGD CONTROL HEMORRHAGE performed by VARINDER Khan MD at [facility] ENDOSCOPY    UPPER GASTROINTESTINAL ENDOSCOPY N/A 2019    EGD CONTROL HEMORRHAGE with BIPOLAR CAUTERY performed by Florencio Molina DO at [facility] ENDOSCOPY      Family History   Problem Relation Age of Onset    Rheum Arthritis Mother     Dementia Mother     High Blood Pressure Father     Parkinsonism Father     High Blood Pressure Sister       Social History     Socioeconomic History    Marital status: Single   Tobacco Use    Smoking status: Never    Smokeless tobacco: Never   Vaping Use    Vaping status: Never Used   Substance and Sexual Activity    Alcohol use: No    Drug use: Never    Sexual activity: Not Currently     Social Drivers of Health     Food Insecurity: No Food Insecurity (2025)    Hunger Vital Sign     Worried About Running Out of Food in the Last Year: Never true     Ran Out of Food in the Last Year: Never true   Transportation Needs: No Transportation Needs (2025)    PRAPARE - Transportation     Lack of Transportation (Medical): No     Lack of

## 2025-06-03 ENCOUNTER — OUTSIDE SERVICES (OUTPATIENT)
Dept: PRIMARY CARE CLINIC | Age: 70
End: 2025-06-03

## 2025-06-03 DIAGNOSIS — E11.65 TYPE 2 DIABETES MELLITUS WITH HYPERGLYCEMIA, WITHOUT LONG-TERM CURRENT USE OF INSULIN (HCC): ICD-10-CM

## 2025-06-03 DIAGNOSIS — R53.1 GENERALIZED WEAKNESS: ICD-10-CM

## 2025-06-03 DIAGNOSIS — Z98.890 S/P ORIF (OPEN REDUCTION INTERNAL FIXATION) FRACTURE: ICD-10-CM

## 2025-06-03 DIAGNOSIS — M97.01XA PERIPROSTHETIC FRACTURE AROUND INTERNAL PROSTHETIC RIGHT HIP JOINT, INITIAL ENCOUNTER (HCC): Primary | ICD-10-CM
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
## 2025-06-03 DIAGNOSIS — M15.9 GENERALIZED OSTEOARTHRITIS: ICD-10-CM

## 2025-06-03 DIAGNOSIS — N18.31 STAGE 3A CHRONIC KIDNEY DISEASE (HCC): ICD-10-CM

## 2025-06-03 DIAGNOSIS — R26.2 DIFFICULTY WALKING: ICD-10-CM

## 2025-06-03 DIAGNOSIS — R53.81 PHYSICAL DECONDITIONING: ICD-10-CM

## 2025-06-03 DIAGNOSIS — M1A.39X1 CHRONIC GOUT DUE TO RENAL IMPAIRMENT OF MULTIPLE SITES WITH TOPHUS: ICD-10-CM

## 2025-06-03 DIAGNOSIS — Z87.81 S/P ORIF (OPEN REDUCTION INTERNAL FIXATION) FRACTURE: ICD-10-CM

## 2025-06-03 DIAGNOSIS — Z91.81 AT MAXIMUM RISK FOR FALL: ICD-10-CM

## 2025-06-05 ENCOUNTER — OUTSIDE SERVICES (OUTPATIENT)
Dept: PRIMARY CARE CLINIC | Age: 70
End: 2025-06-05

## 2025-06-05 DIAGNOSIS — R53.1 GENERALIZED WEAKNESS: ICD-10-CM

## 2025-06-05 DIAGNOSIS — M1A.39X1 CHRONIC GOUT DUE TO RENAL IMPAIRMENT OF MULTIPLE SITES WITH TOPHUS: ICD-10-CM
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
## 2025-06-05 DIAGNOSIS — Z98.890 S/P ORIF (OPEN REDUCTION INTERNAL FIXATION) FRACTURE: ICD-10-CM

## 2025-06-05 DIAGNOSIS — M97.01XA PERIPROSTHETIC FRACTURE AROUND INTERNAL PROSTHETIC RIGHT HIP JOINT, INITIAL ENCOUNTER (HCC): Primary | ICD-10-CM

## 2025-06-05 DIAGNOSIS — R26.2 DIFFICULTY WALKING: ICD-10-CM

## 2025-06-05 DIAGNOSIS — Z87.81 S/P ORIF (OPEN REDUCTION INTERNAL FIXATION) FRACTURE: ICD-10-CM

## 2025-06-05 DIAGNOSIS — E11.65 TYPE 2 DIABETES MELLITUS WITH HYPERGLYCEMIA, WITHOUT LONG-TERM CURRENT USE OF INSULIN (HCC): ICD-10-CM

## 2025-06-05 DIAGNOSIS — Z91.81 AT MAXIMUM RISK FOR FALL: ICD-10-CM

## 2025-06-05 DIAGNOSIS — R53.81 PHYSICAL DECONDITIONING: ICD-10-CM

## 2025-06-05 DIAGNOSIS — M15.9 GENERALIZED OSTEOARTHRITIS: ICD-10-CM

## 2025-06-05 DIAGNOSIS — N18.31 STAGE 3A CHRONIC KIDNEY DISEASE (HCC): ICD-10-CM

## 2025-06-06 ENCOUNTER — OUTSIDE SERVICES (OUTPATIENT)
Dept: PRIMARY CARE CLINIC | Age: 70
End: 2025-06-06

## 2025-06-06 DIAGNOSIS — M97.01XA PERIPROSTHETIC FRACTURE AROUND INTERNAL PROSTHETIC RIGHT HIP JOINT, INITIAL ENCOUNTER (HCC): ICD-10-CM
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
## 2025-06-06 DIAGNOSIS — Z98.890 S/P ORIF (OPEN REDUCTION INTERNAL FIXATION) FRACTURE: ICD-10-CM

## 2025-06-06 DIAGNOSIS — I10 ESSENTIAL HYPERTENSION: ICD-10-CM

## 2025-06-06 DIAGNOSIS — E11.65 TYPE 2 DIABETES MELLITUS WITH HYPERGLYCEMIA, WITHOUT LONG-TERM CURRENT USE OF INSULIN (HCC): ICD-10-CM

## 2025-06-06 DIAGNOSIS — Z87.81 S/P ORIF (OPEN REDUCTION INTERNAL FIXATION) FRACTURE: ICD-10-CM

## 2025-06-06 NOTE — PROGRESS NOTES
Sravanthi Lr (:  1955) is a 69 y.o. female.    Subjective     Patient states she is doing okay her pain is okay.  She denies any fever or chills.    [facility]  Progress notes reviewed.    Past Medical History:   Diagnosis Date    Diabetes mellitus (HCC)     poorly controlled    Hypertension        No Known Allergies          Review of Systems-as above       Objective   Physical Exam  Constitutional:       Appearance: She is well-developed.   HENT:      Head: Normocephalic.   Cardiovascular:      Rate and Rhythm: Normal rate and regular rhythm.      Heart sounds: Normal heart sounds. No murmur heard.  Pulmonary:      Effort: Pulmonary effort is normal. No respiratory distress.      Breath sounds: Normal breath sounds. No wheezing.   Abdominal:      General: Bowel sounds are normal.      Palpations: Abdomen is soft.   Musculoskeletal:         General: Tenderness present.   Skin:     General: Skin is warm and dry.   Neurological:      Mental Status: She is alert and oriented to person, place, and time.   Psychiatric:         Behavior: Behavior normal.         Recent Labs     25  0449 25  0504 25  0515   WBC 6.2 7.9 9.3   HGB 7.7* 8.0* 8.8*   HCT 23.5* 23.9* 26.3*   MCV 93.6 91.9 91.6    159 171      Lab Results   Component Value Date     2025    K 4.2 2025     2025    CO2 20 (L) 2025    BUN 37 (H) 2025    CREATININE 1.6 (H) 2025    GLUCOSE 237 (H) 2025    CALCIUM 8.5 (L) 2025    BILITOT 0.6 2025    ALKPHOS 84 2025    AST 28 2025    ALT 8 2025    LABGLOM 34 (L) 2025    GFRAA 60 2022        Hemoglobin A1C   Date Value Ref Range Status   2025 6.5 (H) 4.0 - 5.6 % Final     No results found for: \"CHOL\"  No results found for: \"TRIG\"  No results found for: \"HDL\"  No components found for: \"LDLCHOLESTEROL\", \"LDLCALC\"  No results found for: \"VLDL\"  No results found for:

## 2025-06-09 ENCOUNTER — OUTSIDE SERVICES (OUTPATIENT)
Dept: PRIMARY CARE CLINIC | Age: 70
End: 2025-06-09

## 2025-06-09 DIAGNOSIS — N18.31 STAGE 3A CHRONIC KIDNEY DISEASE (HCC): ICD-10-CM

## 2025-06-09 DIAGNOSIS — Z87.81 S/P ORIF (OPEN REDUCTION INTERNAL FIXATION) FRACTURE: ICD-10-CM

## 2025-06-09 DIAGNOSIS — I10 ESSENTIAL HYPERTENSION: ICD-10-CM

## 2025-06-09 DIAGNOSIS — Z98.890 S/P ORIF (OPEN REDUCTION INTERNAL FIXATION) FRACTURE: ICD-10-CM

## 2025-06-09 DIAGNOSIS — R53.1 GENERALIZED WEAKNESS: ICD-10-CM

## 2025-06-09 DIAGNOSIS — R53.81 PHYSICAL DECONDITIONING: ICD-10-CM

## 2025-06-09 DIAGNOSIS — M97.01XA PERIPROSTHETIC FRACTURE AROUND INTERNAL PROSTHETIC RIGHT HIP JOINT, INITIAL ENCOUNTER (HCC): Primary | ICD-10-CM

## 2025-06-09 DIAGNOSIS — R26.2 DIFFICULTY WALKING: ICD-10-CM

## 2025-06-09 DIAGNOSIS — Z91.81 AT MAXIMUM RISK FOR FALL: ICD-10-CM

## 2025-06-09 DIAGNOSIS — M1A.39X1 CHRONIC GOUT DUE TO RENAL IMPAIRMENT OF MULTIPLE SITES WITH TOPHUS: ICD-10-CM
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
## 2025-06-09 DIAGNOSIS — M15.9 GENERALIZED OSTEOARTHRITIS: ICD-10-CM

## 2025-06-09 DIAGNOSIS — E11.65 TYPE 2 DIABETES MELLITUS WITH HYPERGLYCEMIA, WITHOUT LONG-TERM CURRENT USE OF INSULIN (HCC): ICD-10-CM

## 2025-06-10 ENCOUNTER — OUTSIDE SERVICES (OUTPATIENT)
Dept: PRIMARY CARE CLINIC | Age: 70
End: 2025-06-10

## 2025-06-10 DIAGNOSIS — E11.65 TYPE 2 DIABETES MELLITUS WITH HYPERGLYCEMIA, WITHOUT LONG-TERM CURRENT USE OF INSULIN (HCC): ICD-10-CM

## 2025-06-10 DIAGNOSIS — R53.1 GENERALIZED WEAKNESS: ICD-10-CM

## 2025-06-10 DIAGNOSIS — R26.2 DIFFICULTY WALKING: ICD-10-CM

## 2025-06-10 DIAGNOSIS — I10 ESSENTIAL HYPERTENSION: ICD-10-CM
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
## 2025-06-10 DIAGNOSIS — Z91.81 AT MAXIMUM RISK FOR FALL: ICD-10-CM

## 2025-06-10 DIAGNOSIS — M15.9 GENERALIZED OSTEOARTHRITIS: ICD-10-CM

## 2025-06-10 DIAGNOSIS — M1A.39X1 CHRONIC GOUT DUE TO RENAL IMPAIRMENT OF MULTIPLE SITES WITH TOPHUS: ICD-10-CM

## 2025-06-10 DIAGNOSIS — Z87.81 S/P ORIF (OPEN REDUCTION INTERNAL FIXATION) FRACTURE: ICD-10-CM

## 2025-06-10 DIAGNOSIS — R53.81 PHYSICAL DECONDITIONING: ICD-10-CM

## 2025-06-10 DIAGNOSIS — N18.31 STAGE 3A CHRONIC KIDNEY DISEASE (HCC): ICD-10-CM

## 2025-06-10 DIAGNOSIS — Z98.890 S/P ORIF (OPEN REDUCTION INTERNAL FIXATION) FRACTURE: ICD-10-CM

## 2025-06-10 DIAGNOSIS — M97.01XA PERIPROSTHETIC FRACTURE AROUND INTERNAL PROSTHETIC RIGHT HIP JOINT, INITIAL ENCOUNTER (HCC): Primary | ICD-10-CM

## 2025-06-11 ENCOUNTER — OUTSIDE SERVICES (OUTPATIENT)
Dept: PRIMARY CARE CLINIC | Age: 70
End: 2025-06-11
Payer: MEDICARE

## 2025-06-11 ENCOUNTER — OFFICE VISIT (OUTPATIENT)
Dept: ORTHOPEDIC SURGERY | Age: 70
End: 2025-06-11

## 2025-06-11 VITALS
SYSTOLIC BLOOD PRESSURE: 126 MMHG | RESPIRATION RATE: 16 BRPM | DIASTOLIC BLOOD PRESSURE: 80 MMHG | OXYGEN SATURATION: 99 % | TEMPERATURE: 98.1 F | HEART RATE: 92 BPM

## 2025-06-11 DIAGNOSIS — Z98.890 S/P ORIF (OPEN REDUCTION INTERNAL FIXATION) FRACTURE: ICD-10-CM

## 2025-06-11 DIAGNOSIS — M97.01XA PERIPROSTHETIC FRACTURE AROUND INTERNAL PROSTHETIC RIGHT HIP JOINT, INITIAL ENCOUNTER (HCC): ICD-10-CM

## 2025-06-11 DIAGNOSIS — E11.65 TYPE 2 DIABETES MELLITUS WITH HYPERGLYCEMIA, WITHOUT LONG-TERM CURRENT USE OF INSULIN (HCC): ICD-10-CM

## 2025-06-11 DIAGNOSIS — S72.421A CLOSED DISPLACED FRACTURE OF LATERAL CONDYLE OF RIGHT FEMUR, INITIAL ENCOUNTER (HCC): Primary | ICD-10-CM
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
## 2025-06-11 DIAGNOSIS — Z87.81 S/P ORIF (OPEN REDUCTION INTERNAL FIXATION) FRACTURE: ICD-10-CM

## 2025-06-11 DIAGNOSIS — I10 ESSENTIAL HYPERTENSION: ICD-10-CM

## 2025-06-11 PROCEDURE — 99024 POSTOP FOLLOW-UP VISIT: CPT | Performed by: STUDENT IN AN ORGANIZED HEALTH CARE EDUCATION/TRAINING PROGRAM

## 2025-06-11 PROCEDURE — 99309 SBSQ NF CARE MODERATE MDM 30: CPT | Performed by: STUDENT IN AN ORGANIZED HEALTH CARE EDUCATION/TRAINING PROGRAM

## 2025-06-11 NOTE — PROGRESS NOTES
\"TRIG\"  No results found for: \"HDL\"  No components found for: \"LDLCHOLESTEROL\", \"LDLCALC\"  No results found for: \"VLDL\"  No results found for: \"CHOLHDLRATIO\"  No results found for: \"TSH\", \"TSHFT4\", \"TSHELE\", \"OVX5ITI\", \"TSHHS\"         Assessment & Plan     1. Fall, sequela  2. Periprosthetic fracture around internal prosthetic right hip joint, initial encounter ([facility])  3. S/P ORIF (open reduction internal fixation) fracture  4. Type 2 diabetes mellitus with hyperglycemia, without long-term current use of insulin ([facility])  5. Essential hypertension             Labs reviewed as above and when and where available.  Currently, pain seems to be improving and she seems to be responding in therapy however she does get a burning pain in the early evening however is responding to Zanaflex and thus we will continue this.  Continue her current dosage of narcotic with ultimate goal of weaning and eventually stopping altogether..  Blood pressure is well-controlled thus we will continue current dosage of losartan.  Diabetes seems to be doing okay and thus we will continue current dosage of Jardiance.    An electronic signature was used to authenticate this note.    --Maurice Dias, DO   This office note has been dictated.

## 2025-06-11 NOTE — PROGRESS NOTES
Continue aspirin 325 mg twice daily for 2 more weeks for DVT prophylaxis.  We discussed that it can take 6 months to a year to fully recover.  She verbalizes understanding.  All questions and concerns were answered in detail.  She will follow-up in 8 weeks for repeat imaging and to progress her weightbearing.    Julissa Casas, JENNI - CNP   Orthopaedic Surgery   6/11/25   3:11 PM EDT

## 2025-06-11 NOTE — PATIENT INSTRUCTIONS
INSTRUCTIONS FOR FACILITY      Weight Bearing: Non-weight bearing right lower extremity. Use assistive devices when needed.  Hinged knee brace unlocked 0 to 90 degrees for range of motion.    Therapy: Continue PT/OT    Pain control: per facility physician    Incisional Care: staples removed today in office. Steri strips placed. Steri strips can be removed in 7-10 days if they do not fall off sooner. Continue to monitor for signs or symptoms of infection until skin has completely healed. Recommend thin layer of Vaseline 1-2x daily over incision line to aid in healing. Okay to shower. No scrubbing near incision until skin has completely healed. Thoroughly pat dry with clean towel.     DVT Prophylaxis: Continue with aspirin 325 mg twice daily for 2 more weeks    Follow up:     Future Appointments   Date Time Provider Department Center   7/29/2025 11:40 AM [facility]       Call office with any questions or concerns.

## 2025-06-12 ENCOUNTER — OUTSIDE SERVICES (OUTPATIENT)
Dept: PRIMARY CARE CLINIC | Age: 70
End: 2025-06-12

## 2025-06-12 DIAGNOSIS — E11.65 TYPE 2 DIABETES MELLITUS WITH HYPERGLYCEMIA, WITHOUT LONG-TERM CURRENT USE OF INSULIN (HCC): ICD-10-CM

## 2025-06-12 DIAGNOSIS — Z98.890 S/P ORIF (OPEN REDUCTION INTERNAL FIXATION) FRACTURE: ICD-10-CM

## 2025-06-12 DIAGNOSIS — I10 ESSENTIAL HYPERTENSION: ICD-10-CM

## 2025-06-12 DIAGNOSIS — R53.1 GENERALIZED WEAKNESS: ICD-10-CM

## 2025-06-12 DIAGNOSIS — N18.31 STAGE 3A CHRONIC KIDNEY DISEASE (HCC): ICD-10-CM

## 2025-06-12 DIAGNOSIS — Z91.81 AT MAXIMUM RISK FOR FALL: ICD-10-CM

## 2025-06-12 DIAGNOSIS — M97.01XA PERIPROSTHETIC FRACTURE AROUND INTERNAL PROSTHETIC RIGHT HIP JOINT, INITIAL ENCOUNTER (HCC): Primary | ICD-10-CM

## 2025-06-12 DIAGNOSIS — M1A.39X1 CHRONIC GOUT DUE TO RENAL IMPAIRMENT OF MULTIPLE SITES WITH TOPHUS: ICD-10-CM
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
## 2025-06-12 DIAGNOSIS — R53.81 PHYSICAL DECONDITIONING: ICD-10-CM

## 2025-06-12 DIAGNOSIS — M15.9 GENERALIZED OSTEOARTHRITIS: ICD-10-CM

## 2025-06-12 DIAGNOSIS — Z87.81 S/P ORIF (OPEN REDUCTION INTERNAL FIXATION) FRACTURE: ICD-10-CM

## 2025-06-12 DIAGNOSIS — R26.2 DIFFICULTY WALKING: ICD-10-CM

## 2025-06-17 ENCOUNTER — OUTSIDE SERVICES (OUTPATIENT)
Dept: PRIMARY CARE CLINIC | Age: 70
End: 2025-06-17

## 2025-06-17 ENCOUNTER — TELEPHONE (OUTPATIENT)
Dept: ORTHOPEDIC SURGERY | Age: 70
End: 2025-06-17

## 2025-06-17 DIAGNOSIS — R53.1 GENERALIZED WEAKNESS: ICD-10-CM

## 2025-06-17 DIAGNOSIS — N18.31 STAGE 3A CHRONIC KIDNEY DISEASE (HCC): ICD-10-CM

## 2025-06-17 DIAGNOSIS — R53.81 PHYSICAL DECONDITIONING: ICD-10-CM

## 2025-06-17 DIAGNOSIS — E11.65 TYPE 2 DIABETES MELLITUS WITH HYPERGLYCEMIA, WITHOUT LONG-TERM CURRENT USE OF INSULIN (HCC): ICD-10-CM

## 2025-06-17 DIAGNOSIS — Z87.81 S/P ORIF (OPEN REDUCTION INTERNAL FIXATION) FRACTURE: ICD-10-CM
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
## 2025-06-17 DIAGNOSIS — Z91.81 AT MAXIMUM RISK FOR FALL: ICD-10-CM

## 2025-06-17 DIAGNOSIS — M97.01XA PERIPROSTHETIC FRACTURE AROUND INTERNAL PROSTHETIC RIGHT HIP JOINT, INITIAL ENCOUNTER (HCC): Primary | ICD-10-CM

## 2025-06-17 DIAGNOSIS — M15.9 GENERALIZED OSTEOARTHRITIS: ICD-10-CM

## 2025-06-17 DIAGNOSIS — Z98.890 S/P ORIF (OPEN REDUCTION INTERNAL FIXATION) FRACTURE: ICD-10-CM

## 2025-06-17 DIAGNOSIS — R26.2 DIFFICULTY WALKING: ICD-10-CM

## 2025-06-17 DIAGNOSIS — M1A.39X1 CHRONIC GOUT DUE TO RENAL IMPAIRMENT OF MULTIPLE SITES WITH TOPHUS: ICD-10-CM

## 2025-06-17 DIAGNOSIS — I10 ESSENTIAL HYPERTENSION: ICD-10-CM

## 2025-06-17 NOTE — TELEPHONE ENCOUNTER
Surgery: Open reduction internal fixation right distal femur fracture; revision open reduction internal fixation right distal femur fracture  Date: 5/26/2025; 5/27/2025     [facility] called.  Patient being discharged form rehab facility tomorrow.  Asking if Dr Canales will follow with home care (safety, transfers etc) They know patient is NWB and has FU appt here 8/13/2025.      Need verbal order only

## 2025-06-18 ENCOUNTER — OUTSIDE SERVICES (OUTPATIENT)
Dept: PRIMARY CARE CLINIC | Age: 70
End: 2025-06-18
Payer: MEDICARE

## 2025-06-18 DIAGNOSIS — R53.81 PHYSICAL DECONDITIONING: ICD-10-CM

## 2025-06-18 DIAGNOSIS — E11.65 TYPE 2 DIABETES MELLITUS WITH HYPERGLYCEMIA, WITHOUT LONG-TERM CURRENT USE OF INSULIN (HCC): ICD-10-CM

## 2025-06-18 DIAGNOSIS — Z87.81 S/P ORIF (OPEN REDUCTION INTERNAL FIXATION) FRACTURE: ICD-10-CM

## 2025-06-18 DIAGNOSIS — M97.01XA PERIPROSTHETIC FRACTURE AROUND INTERNAL PROSTHETIC RIGHT HIP JOINT, INITIAL ENCOUNTER (HCC): ICD-10-CM

## 2025-06-18 DIAGNOSIS — I10 ESSENTIAL HYPERTENSION: ICD-10-CM

## 2025-06-18 DIAGNOSIS — Z98.890 S/P ORIF (OPEN REDUCTION INTERNAL FIXATION) FRACTURE: ICD-10-CM
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
## 2025-06-18 PROCEDURE — 99309 SBSQ NF CARE MODERATE MDM 30: CPT | Performed by: STUDENT IN AN ORGANIZED HEALTH CARE EDUCATION/TRAINING PROGRAM

## 2025-06-18 NOTE — PROGRESS NOTES
Sravanthi Lr (:  1955) is a 69 y.o. female.    Subjective     Patient states she is doing okay and therapy is going okay.  She denies any fever or chills.    Location: [facility] room 518  Progress notes reviewed.    Past Medical History:   Diagnosis Date    Diabetes mellitus (HCC)     poorly controlled    Hypertension        No Known Allergies          Review of Systems-as above       Objective   Physical Exam  Constitutional:       Appearance: She is well-developed.   HENT:      Head: Normocephalic.   Cardiovascular:      Rate and Rhythm: Normal rate and regular rhythm.      Heart sounds: Normal heart sounds. No murmur heard.  Pulmonary:      Effort: Pulmonary effort is normal. No respiratory distress.      Breath sounds: Normal breath sounds. No wheezing.   Abdominal:      General: Bowel sounds are normal.      Palpations: Abdomen is soft.   Musculoskeletal:         General: Tenderness present.   Skin:     General: Skin is warm and dry.   Neurological:      Mental Status: She is alert and oriented to person, place, and time.   Psychiatric:         Behavior: Behavior normal.         Recent Labs     25  0449 25  0504 25  0515   WBC 6.2 7.9 9.3   HGB 7.7* 8.0* 8.8*   HCT 23.5* 23.9* 26.3*   MCV 93.6 91.9 91.6    159 171      Lab Results   Component Value Date     2025    K 4.2 2025     2025    CO2 20 (L) 2025    BUN 37 (H) 2025    CREATININE 1.6 (H) 2025    GLUCOSE 237 (H) 2025    CALCIUM 8.5 (L) 2025    BILITOT 0.6 2025    ALKPHOS 84 2025    AST 28 2025    ALT 8 2025    LABGLOM 34 (L) 2025    GFRAA 60 2022        Hemoglobin A1C   Date Value Ref Range Status   2025 6.5 (H) 4.0 - 5.6 % Final     No results found for: \"CHOL\"  No results found for: \"TRIG\"  No results found for: \"HDL\"  No components found for: \"LDLCHOLESTEROL\", \"LDLCALC\"  No results found for: \"VLDL\"  No results

## 2025-06-19 DIAGNOSIS — Z98.890 S/P ORIF (OPEN REDUCTION INTERNAL FIXATION) FRACTURE: Primary | ICD-10-CM

## 2025-06-19 DIAGNOSIS — M97.01XA PERIPROSTHETIC FRACTURE AROUND INTERNAL PROSTHETIC RIGHT HIP JOINT, INITIAL ENCOUNTER (HCC): ICD-10-CM

## 2025-06-19 DIAGNOSIS — Z87.81 S/P ORIF (OPEN REDUCTION INTERNAL FIXATION) FRACTURE: Primary | ICD-10-CM

## 2025-06-19 RX ORDER — OXYCODONE HYDROCHLORIDE 5 MG/1
5 TABLET ORAL EVERY 6 HOURS PRN
Qty: 20 TABLET | Refills: 0 | Status: SHIPPED | OUTPATIENT
Start: 2025-06-19 | End: 2025-06-24

## 2025-06-23 DIAGNOSIS — M97.01XA PERIPROSTHETIC FRACTURE AROUND INTERNAL PROSTHETIC RIGHT HIP JOINT, INITIAL ENCOUNTER (HCC): ICD-10-CM

## 2025-06-23 RX ORDER — OXYCODONE HYDROCHLORIDE 5 MG/1
5 TABLET ORAL EVERY 6 HOURS PRN
Qty: 28 TABLET | Refills: 0 | Status: SHIPPED | OUTPATIENT
Start: 2025-06-23 | End: 2025-06-30

## 2025-06-23 NOTE — TELEPHONE ENCOUNTER
RIGHT DISTAL FEMUR HARDWARE REVISION 5/27/25  FEMUR OPEN REDUCTION INTERNAL FIXATION  5/26/25    Patient home today from rehab facility. Requesting pain medication.    PT is working with her on flexion and extension as in note. Asking if any exercises patient should not be doing? Restrictions aside from weightbearing?

## 2025-06-23 NOTE — TELEPHONE ENCOUNTER
Controlled Substance Monitoring:    Acute and Chronic Pain Monitoring:   RX Monitoring Periodic Controlled Substance Monitoring   6/23/2025   2:28 PM No signs of potential drug abuse or diversion identified.       Patient discharged from facility. Pain medication sent to pharmacy. PT restrictions: Non-weightbearing. Only range of motion at this point. OK for stretches to be accompanied if any.

## 2025-07-03 ASSESSMENT — ENCOUNTER SYMPTOMS
SINUS PRESSURE: 0
EYE ITCHING: 0
EYE DISCHARGE: 0
EYE REDNESS: 0
CHEST TIGHTNESS: 0
ABDOMINAL PAIN: 0
WHEEZING: 0
COUGH: 0
COUGH: 0
DIARRHEA: 0
ABDOMINAL PAIN: 0
EYE DISCHARGE: 0
EYE DISCHARGE: 0
ABDOMINAL PAIN: 0
EYE ITCHING: 0
ABDOMINAL PAIN: 0
WHEEZING: 0
ABDOMINAL DISTENTION: 0
SINUS PRESSURE: 0
EYE REDNESS: 0
EYE PAIN: 0
ABDOMINAL DISTENTION: 0
CHEST TIGHTNESS: 0
DIARRHEA: 0
DIARRHEA: 0
SHORTNESS OF BREATH: 0
EYE ITCHING: 0
EYE REDNESS: 0
DIARRHEA: 0
CONSTIPATION: 0
SINUS PRESSURE: 0
CHEST TIGHTNESS: 0
EYE PAIN: 0
EYE ITCHING: 0
VOMITING: 0
SHORTNESS OF BREATH: 0
ABDOMINAL PAIN: 0
EYE REDNESS: 0
VOMITING: 0
SHORTNESS OF BREATH: 0
CHEST TIGHTNESS: 0
ABDOMINAL DISTENTION: 0
EYE PAIN: 0
SINUS PRESSURE: 0
EYE ITCHING: 0
VOMITING: 0
EYE PAIN: 0
ABDOMINAL PAIN: 0
WHEEZING: 0
CONSTIPATION: 0
NAUSEA: 0
EYE REDNESS: 0
SORE THROAT: 0
ABDOMINAL DISTENTION: 0
COUGH: 0
NAUSEA: 0
SORE THROAT: 0
WHEEZING: 0
CHEST TIGHTNESS: 0
NAUSEA: 0
NAUSEA: 0
SHORTNESS OF BREATH: 0
SORE THROAT: 0
CONSTIPATION: 0
SORE THROAT: 0
WHEEZING: 0
EYE REDNESS: 0
EYE PAIN: 0
ABDOMINAL DISTENTION: 0
CONSTIPATION: 0
EYE DISCHARGE: 0
CONSTIPATION: 0
CONSTIPATION: 0
SORE THROAT: 0
WHEEZING: 0
VOMITING: 0
COUGH: 0
VOMITING: 0
DIARRHEA: 0
SHORTNESS OF BREATH: 0
ABDOMINAL DISTENTION: 0
NAUSEA: 0
DIARRHEA: 0
EYE DISCHARGE: 0
EYE PAIN: 0
COUGH: 0
COUGH: 0
SORE THROAT: 0
VOMITING: 0
EYE DISCHARGE: 0
NAUSEA: 0
EYE ITCHING: 0
CHEST TIGHTNESS: 0
SHORTNESS OF BREATH: 0

## 2025-07-04 NOTE — PROGRESS NOTES
Sravanthi Lr (:  1955) is a 69 y.o. female that is seen today for skilled assessment    Location: [facility]  Progress and nursing notes reviewed    Sravanthi is awake alert and in no obvious distress. She is sitting up in bed in room with her right leg in brace. She has some discomfort to her right lower extremity and remains on pain medication as needed. She continues to work in therapies as tolerated. She will follow up with Ortho. Nursing has no concerns and will let Dr. GARZA or PA know of any changes.    Subjective   SUBJECTIVE/OBJECTIVE:  Past Medical History:   Diagnosis Date    Diabetes mellitus (HCC)     poorly controlled    Hypertension       Past Surgical History:   Procedure Laterality Date    APPENDECTOMY      CARPAL TUNNEL RELEASE      COLONOSCOPY N/A 2019    COLONOSCOPY DIAGNOSTIC performed by VARINDER Khan MD at [facility] ENDOSCOPY    DEBRIDEMENT Left 3/11/2024    CYSTOSCOPY RETROGRADE PYELOGRAM LEFT STENT INSERTION performed by Shawn Boykin MD at [facility] OR    FEMUR SURGERY Right 2025    FEMUR OPEN REDUCTION INTERNAL FIXATION performed by Ramy Canales DO at [facility] [place]    FEMUR SURGERY Right 2025    RIGHT DISTAL FEMUR HARDWARE REVISION performed by Ramy Canales DO at [facility] [place]    FRACTURE SURGERY      rt ankle    KNEE SURGERY      arthroscopic    UPPER GASTROINTESTINAL ENDOSCOPY N/A 2019    EGD CONTROL HEMORRHAGE performed by VARINDER Khan MD at [facility] ENDOSCOPY    UPPER GASTROINTESTINAL ENDOSCOPY N/A 2019    EGD CONTROL HEMORRHAGE with BIPOLAR CAUTERY performed by Florencio Molina DO at [facility] ENDOSCOPY      Family History   Problem Relation Age of Onset    Rheum Arthritis Mother     Dementia Mother     High Blood Pressure Father     Parkinsonism Father     High Blood Pressure Sister       Social History     Socioeconomic History    Marital status: Single   Tobacco Use    Smoking status: Never    Smokeless tobacco: Never   Vaping Use

## 2025-07-04 NOTE — PROGRESS NOTES
Sravanthi Lr (:  1955) is a 69 y.o. female that is seen today for skilled assessment    Location: [facility]  Progress and nursing notes reviewed    Sravanthi is sitting up in wheelchair and working with her therapist.  She is awake alert and in no obvious distress.  She has some discomfort to her right lower extremity at this time.  She continues pain medication as needed. She continues to work in therapies as tolerated. Patient requested to have gout medication discontinued, will repeat uric acid. She is to follow up with Ortho.  No concerns from nursing. No concerns from nursing. Nursing will let Dr. GARZA or PA know of any changes.    Subjective   SUBJECTIVE/OBJECTIVE:  Past Medical History:   Diagnosis Date    Diabetes mellitus (HCC)     poorly controlled    Hypertension       Past Surgical History:   Procedure Laterality Date    APPENDECTOMY      CARPAL TUNNEL RELEASE      COLONOSCOPY N/A 2019    COLONOSCOPY DIAGNOSTIC performed by VARINDER Khan MD at [facility] ENDOSCOPY    DEBRIDEMENT Left 3/11/2024    CYSTOSCOPY RETROGRADE PYELOGRAM LEFT STENT INSERTION performed by Shawn Boykin MD at [facility] OR    FEMUR SURGERY Right 2025    FEMUR OPEN REDUCTION INTERNAL FIXATION performed by Ramy Canales DO at [facility] OR    FEMUR SURGERY Right 2025    RIGHT DISTAL FEMUR HARDWARE REVISION performed by Ramy Canales DO at [facility] OR    FRACTURE SURGERY      rt ankle    KNEE SURGERY      arthroscopic    UPPER GASTROINTESTINAL ENDOSCOPY N/A 2019    EGD CONTROL HEMORRHAGE performed by VARINDER Khan MD at [facility] ENDOSCOPY    UPPER GASTROINTESTINAL ENDOSCOPY N/A 2019    EGD CONTROL HEMORRHAGE with BIPOLAR CAUTERY performed by Florencio Molina DO at [facility] ENDOSCOPY      Family History   Problem Relation Age of Onset    Rheum Arthritis Mother     Dementia Mother     High Blood Pressure Father     Parkinsonism Father     High Blood Pressure Sister       Social History

## 2025-07-04 NOTE — PROGRESS NOTES
Sravanthi Lr (:  1955) is a 69 y.o. female that is seen today for skilled assessment    Location: [facility]  Progress and nursing notes reviewed    Sravanthi is sitting up in wheelchair and working with her therapist.  She is awake alert and in no obvious distress.  She has complaints of discomfort to her right lower extremity at this time.  She continues to as needed pain medication. She continues to work in therapies as tolerated. She is to follow up with Ortho. No concerns from nursing. Nursing will let Dr. GARZA or PA know of any changes.    Subjective   SUBJECTIVE/OBJECTIVE:  Past Medical History:   Diagnosis Date    Diabetes mellitus (HCC)     poorly controlled    Hypertension       Past Surgical History:   Procedure Laterality Date    APPENDECTOMY      CARPAL TUNNEL RELEASE      COLONOSCOPY N/A 2019    COLONOSCOPY DIAGNOSTIC performed by VARINDER Khan MD at [facility] ENDOSCOPY    DEBRIDEMENT Left 3/11/2024    CYSTOSCOPY RETROGRADE PYELOGRAM LEFT STENT INSERTION performed by Shawn Boykin MD at [facility] OR    FEMUR SURGERY Right 2025    FEMUR OPEN REDUCTION INTERNAL FIXATION performed by Ramy Canales DO at [facility] OR    FEMUR SURGERY Right 2025    RIGHT DISTAL FEMUR HARDWARE REVISION performed by Ramy Canales DO at [facility] OR    FRACTURE SURGERY      rt ankle    KNEE SURGERY      arthroscopic    UPPER GASTROINTESTINAL ENDOSCOPY N/A 2019    EGD CONTROL HEMORRHAGE performed by VARINDER Khan MD at [facility] ENDOSCOPY    UPPER GASTROINTESTINAL ENDOSCOPY N/A 2019    EGD CONTROL HEMORRHAGE with BIPOLAR CAUTERY performed by Florencio Molina DO at [facility] ENDOSCOPY      Family History   Problem Relation Age of Onset    Rheum Arthritis Mother     Dementia Mother     High Blood Pressure Father     Parkinsonism Father     High Blood Pressure Sister       Social History     Socioeconomic History    Marital status: Single   Tobacco Use    Smoking status: Never

## 2025-07-04 NOTE — PROGRESS NOTES
05/26/2025 6.5 (H) 4.0 - 5.6 % Final     No results found for: \"CHOL\"  No results found for: \"TRIG\"  No results found for: \"HDL\"  No components found for: \"LDLCHOLESTEROL\", \"LDLCALC\"  No results found for: \"VLDL\"  No results found for: \"CHOLHDLRATIO\"  No results found for: \"TSH\", \"TSHFT4\", \"TSHELE\", \"BVW8CFR\", \"TSHHS\"         Assessment & Plan   ASSESSMENT/PLAN:  1. Periprosthetic fracture around internal prosthetic right hip joint, initial encounter ([facility])  2. S/P ORIF (open reduction internal fixation) fracture  3. Type 2 diabetes mellitus with hyperglycemia, without long-term current use of insulin ([facility])  4. Stage 3a chronic kidney disease ([facility])  5. 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 Essential hypertension  7. Fall, sequela  8. Generalized osteoarthritis  9. Generalized weakness  10. Physical deconditioning  11. Difficulty walking  12. At maximum risk for fall            Seen today for weekly skilled assessment  Chart reviewed: Continue with orders per chart in point click care  Labs: Collect weekly CBC and CMP x3 weeks from admission then monthly.  Continue with therapies as tolerated  Continue with weekly skilled assessment  Continue with discharge planning  Acute medical concerns provider on-call    Review all medications and diagnosis    Review and continue losartan 100 mg once a day for hypertension    Review and continue Mounjaro 7.5 mg / 0.5 mL inject 7.5 mg sq once a week for diabetes      Follow-up with Ortho      Over 30 min was spent between patient care, chart review, discussing patient management with nursing staff and interpreting diagnostics           An electronic signature was used to authenticate this note.    --Cari Montez, JENNI - CNP   This office note has been dictated.

## 2025-07-04 NOTE — PROGRESS NOTES
CO2 20 (L) 05/28/2025    BUN 37 (H) 05/28/2025    CREATININE 1.6 (H) 05/28/2025    GLUCOSE 237 (H) 05/28/2025    CALCIUM 8.5 (L) 05/28/2025    BILITOT 0.6 05/28/2025    ALKPHOS 84 05/28/2025    AST 28 05/28/2025    ALT 8 05/28/2025    LABGLOM 34 (L) 05/28/2025    GFRAA 60 07/27/2022        Hemoglobin A1C   Date Value Ref Range Status   05/26/2025 6.5 (H) 4.0 - 5.6 % Final     No results found for: \"CHOL\"  No results found for: \"TRIG\"  No results found for: \"HDL\"  No components found for: \"LDLCHOLESTEROL\", \"LDLCALC\"  No results found for: \"VLDL\"  No results found for: \"CHOLHDLRATIO\"  No results found for: \"TSH\", \"TSHFT4\", \"TSHELE\", \"QVM2BFY\", \"TSHHS\"         Assessment & Plan   ASSESSMENT/PLAN:  1. Periprosthetic fracture around internal prosthetic right hip joint, initial encounter ([facility])  2. Type 2 diabetes mellitus with hyperglycemia, without long-term current use of insulin ([facility])  3. Stage 3a chronic kidney disease ([facility])  4. 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 S/P ORIF (open reduction internal fixation) fracture  6. Essential hypertension  7. Fall, sequela  8. Generalized osteoarthritis  9. Generalized weakness  10. Physical deconditioning  11. Difficulty walking  12. At maximum risk for fall            Seen today for weekly skilled assessment  Chart reviewed: Continue with orders per chart in point click care  Labs: Collect weekly CBC and CMP x3 weeks from admission then monthly.  Continue with therapies as tolerated  Continue with weekly skilled assessment  Continue with discharge planning  Acute medical concerns provider on-call    Review all medications and diagnosis    Review and continue MiraLAX 17 g once a day as needed for constipation    Review and continue Tylenol 650 mg every 4 hours as needed for pain      Follow-up with Ortho      Over 30 min was spent between patient care, chart review, discussing patient management with nursing staff and interpreting diagnostics

## 2025-07-04 NOTE — PROGRESS NOTES
Sravanthi Lr (:  1955) is a 69 y.o. female that is seen today for skilled assessment    Location: [facility] skilled [facility] facility  Progress and nursing notes reviewed    Sravanthi is awake alert and in no obvious distress.  She is sitting up in wheelchair in room with her TV on.  She has some discomfort to her right lower extremity when asked.  She remains on pain medication as needed. She continues to work in therapies as tolerated. She will follow up with Ortho.  Nursing has no concerns and will let Dr. GARZA or PA know of any changes.    Subjective   SUBJECTIVE/OBJECTIVE:  Past Medical History:   Diagnosis Date    Diabetes mellitus (HCC)     poorly controlled    Hypertension       Past Surgical History:   Procedure Laterality Date    APPENDECTOMY      CARPAL TUNNEL RELEASE      COLONOSCOPY N/A 2019    COLONOSCOPY DIAGNOSTIC performed by VARINDER Khan MD at [facility] ENDOSCOPY    DEBRIDEMENT Left 3/11/2024    CYSTOSCOPY RETROGRADE PYELOGRAM LEFT STENT INSERTION performed by Shawn Boykin MD at [facility] OR    FEMUR SURGERY Right 2025    FEMUR OPEN REDUCTION INTERNAL FIXATION performed by Ramy Canales DO at [facility] OR    FEMUR SURGERY Right 2025    RIGHT DISTAL FEMUR HARDWARE REVISION performed by Ramy Canales DO at [facility] OR    FRACTURE SURGERY      rt ankle    KNEE SURGERY      arthroscopic    UPPER GASTROINTESTINAL ENDOSCOPY N/A 2019    EGD CONTROL HEMORRHAGE performed by VARINDER Khan MD at [facility] ENDOSCOPY    UPPER GASTROINTESTINAL ENDOSCOPY N/A 2019    EGD CONTROL HEMORRHAGE with BIPOLAR CAUTERY performed by Florencio Molina DO at [facility] ENDOSCOPY      Family History   Problem Relation Age of Onset    Rheum Arthritis Mother     Dementia Mother     High Blood Pressure Father     Parkinsonism Father     High Blood Pressure Sister       Social History     Socioeconomic History    Marital status: Single   Tobacco Use    Smoking status: Never    Smokeless tobacco: Never

## 2025-07-07 ENCOUNTER — TELEPHONE (OUTPATIENT)
Dept: ORTHOPEDIC SURGERY | Age: 70
End: 2025-07-07

## 2025-07-07 NOTE — TELEPHONE ENCOUNTER
Surgery: Open reduction internal fixation right distal femur fracture; revision open reduction internal fixation right distal femur fracture  Date: 5/26/2025; 5/27/2025    Patient called to report she has had horrible diarrhea since being discharged from [facility]. Starting to feel weak.  Instructed her to call her PCP immediately but she does not have one since Dr Mcfarland passed away recently.  Informed her to call Dr Mcfarland's office number to see who is taking on his patients to get appt ASAP.  If she cannot locate new physician, instructed her to be seen at Urgent Care or go to ED at [facility].  She verbalized understanding of instructions.

## 2025-07-09 NOTE — TELEPHONE ENCOUNTER
Spoke with patient, her diarrhea has slowed down a little, encouraged her to see PCP or Urgent Care if needed.

## 2025-07-12 ENCOUNTER — HOSPITAL ENCOUNTER (INPATIENT)
Age: 70
LOS: 2 days | Discharge: HOME | DRG: 394 | End: 2025-07-14
Attending: EMERGENCY MEDICINE | Admitting: INTERNAL MEDICINE
Payer: MEDICARE

## 2025-07-12 ENCOUNTER — APPOINTMENT (OUTPATIENT)
Dept: CT IMAGING | Age: 70
DRG: 394 | End: 2025-07-12
Payer: MEDICARE

## 2025-07-12 ENCOUNTER — APPOINTMENT (OUTPATIENT)
Dept: GENERAL RADIOLOGY | Age: 70
DRG: 394 | End: 2025-07-12
Payer: MEDICARE

## 2025-07-12 DIAGNOSIS — R19.7 DIARRHEA, UNSPECIFIED TYPE: ICD-10-CM

## 2025-07-12 DIAGNOSIS — R16.1 SPLENOMEGALY: ICD-10-CM

## 2025-07-12 DIAGNOSIS — E86.0 DEHYDRATION: Primary | ICD-10-CM

## 2025-07-12 LAB
ALBUMIN SERPL-MCNC: 4.4 G/DL (ref 3.5–5.2)
ALP SERPL-CCNC: 123 U/L (ref 35–104)
ALT SERPL-CCNC: 63 U/L (ref 0–35)
ANION GAP SERPL CALCULATED.3IONS-SCNC: 16 MMOL/L (ref 7–16)
AST SERPL-CCNC: 60 U/L (ref 0–35)
BASOPHILS # BLD: 0.06 K/UL (ref 0–0.2)
BASOPHILS NFR BLD: 1 % (ref 0–2)
BILIRUB DIRECT SERPL-MCNC: 0.3 MG/DL (ref 0–0.2)
BILIRUB INDIRECT SERPL-MCNC: 0.6 MG/DL (ref 0–1)
BILIRUB SERPL-MCNC: 1 MG/DL (ref 0–1.2)
BUN SERPL-MCNC: 48 MG/DL (ref 8–23)
CALCIUM SERPL-MCNC: 9.7 MG/DL (ref 8.8–10.2)
CHLORIDE SERPL-SCNC: 101 MMOL/L (ref 98–107)
CO2 SERPL-SCNC: 18 MMOL/L (ref 22–29)
CREAT SERPL-MCNC: 1.7 MG/DL (ref 0.5–1)
EOSINOPHIL # BLD: 0.12 K/UL (ref 0.05–0.5)
EOSINOPHILS RELATIVE PERCENT: 3 % (ref 0–6)
ERYTHROCYTE [DISTWIDTH] IN BLOOD BY AUTOMATED COUNT: 13.7 % (ref 11.5–15)
GFR, ESTIMATED: 33 ML/MIN/1.73M2
GLUCOSE BLD-MCNC: 101 MG/DL (ref 74–99)
GLUCOSE SERPL-MCNC: 156 MG/DL (ref 74–99)
HCT VFR BLD AUTO: 38.6 % (ref 34–48)
HGB BLD-MCNC: 12.4 G/DL (ref 11.5–15.5)
IMM GRANULOCYTES # BLD AUTO: <0.03 K/UL (ref 0–0.58)
IMM GRANULOCYTES NFR BLD: 0 % (ref 0–5)
LACTATE BLDV-SCNC: 2.1 MMOL/L (ref 0.5–2.2)
LIPASE SERPL-CCNC: 163 U/L (ref 13–60)
LYMPHOCYTES NFR BLD: 0.86 K/UL (ref 1.5–4)
LYMPHOCYTES RELATIVE PERCENT: 18 % (ref 20–42)
MAGNESIUM SERPL-MCNC: 2 MG/DL (ref 1.6–2.4)
MCH RBC QN AUTO: 28.7 PG (ref 26–35)
MCHC RBC AUTO-ENTMCNC: 32.1 G/DL (ref 32–34.5)
MCV RBC AUTO: 89.4 FL (ref 80–99.9)
MONOCYTES NFR BLD: 0.58 K/UL (ref 0.1–0.95)
MONOCYTES NFR BLD: 12 % (ref 2–12)
NEUTROPHILS NFR BLD: 66 % (ref 43–80)
NEUTS SEG NFR BLD: 3.17 K/UL (ref 1.8–7.3)
PLATELET # BLD AUTO: 169 K/UL (ref 130–450)
PMV BLD AUTO: 11.5 FL (ref 7–12)
POTASSIUM SERPL-SCNC: 3.5 MMOL/L (ref 3.5–5.1)
PROT SERPL-MCNC: 7.3 G/DL (ref 6.4–8.3)
RBC # BLD AUTO: 4.32 M/UL (ref 3.5–5.5)
SODIUM SERPL-SCNC: 134 MMOL/L (ref 136–145)
TROPONIN I SERPL HS-MCNC: 23 NG/L (ref 0–14)
TROPONIN I SERPL HS-MCNC: 30 NG/L (ref 0–14)
TSH SERPL DL<=0.05 MIU/L-ACNC: 1.48 UIU/ML (ref 0.27–4.2)
WBC OTHER # BLD: 4.8 K/UL (ref 4.5–11.5)

## 2025-07-12 PROCEDURE — 84484 ASSAY OF TROPONIN QUANT: CPT

## 2025-07-12 PROCEDURE — 83605 ASSAY OF LACTIC ACID: CPT

## 2025-07-12 PROCEDURE — 83690 ASSAY OF LIPASE: CPT

## 2025-07-12 PROCEDURE — 80053 COMPREHEN METABOLIC PANEL: CPT

## 2025-07-12 PROCEDURE — 71045 X-RAY EXAM CHEST 1 VIEW: CPT

## 2025-07-12 PROCEDURE — 6360000002 HC RX W HCPCS: Performed by: INTERNAL MEDICINE

## 2025-07-12 PROCEDURE — 96360 HYDRATION IV INFUSION INIT: CPT

## 2025-07-12 PROCEDURE — 93005 ELECTROCARDIOGRAM TRACING: CPT

## 2025-07-12 PROCEDURE — 85025 COMPLETE CBC W/AUTO DIFF WBC: CPT

## 2025-07-12 PROCEDURE — 99285 EMERGENCY DEPT VISIT HI MDM: CPT

## 2025-07-12 PROCEDURE — 6360000004 HC RX CONTRAST MEDICATION: Performed by: RADIOLOGY

## 2025-07-12 PROCEDURE — 2500000003 HC RX 250 WO HCPCS: Performed by: INTERNAL MEDICINE

## 2025-07-12 PROCEDURE — 99223 1ST HOSP IP/OBS HIGH 75: CPT | Performed by: INTERNAL MEDICINE

## 2025-07-12 PROCEDURE — 82962 GLUCOSE BLOOD TEST: CPT

## 2025-07-12 PROCEDURE — 84443 ASSAY THYROID STIM HORMONE: CPT

## 2025-07-12 PROCEDURE — 83735 ASSAY OF MAGNESIUM: CPT

## 2025-07-12 PROCEDURE — 2060000000 HC ICU INTERMEDIATE R&B

## 2025-07-12 PROCEDURE — 2580000003 HC RX 258

## 2025-07-12 PROCEDURE — 82248 BILIRUBIN DIRECT: CPT

## 2025-07-12 PROCEDURE — 74177 CT ABD & PELVIS W/CONTRAST: CPT

## 2025-07-12 PROCEDURE — 96361 HYDRATE IV INFUSION ADD-ON: CPT

## 2025-07-12 RX ORDER — ONDANSETRON 4 MG/1
4 TABLET, ORALLY DISINTEGRATING ORAL EVERY 8 HOURS PRN
Status: DISCONTINUED | OUTPATIENT
Start: 2025-07-12 | End: 2025-07-14 | Stop reason: HOSPADM

## 2025-07-12 RX ORDER — ACETAMINOPHEN 325 MG/1
650 TABLET ORAL EVERY 6 HOURS PRN
Status: DISCONTINUED | OUTPATIENT
Start: 2025-07-12 | End: 2025-07-14 | Stop reason: HOSPADM

## 2025-07-12 RX ORDER — INSULIN LISPRO 100 [IU]/ML
0-4 INJECTION, SOLUTION INTRAVENOUS; SUBCUTANEOUS
Status: DISCONTINUED | OUTPATIENT
Start: 2025-07-12 | End: 2025-07-14 | Stop reason: HOSPADM

## 2025-07-12 RX ORDER — POLYETHYLENE GLYCOL 3350 17 G/17G
17 POWDER, FOR SOLUTION ORAL DAILY PRN
Status: DISCONTINUED | OUTPATIENT
Start: 2025-07-12 | End: 2025-07-14 | Stop reason: HOSPADM

## 2025-07-12 RX ORDER — METOPROLOL SUCCINATE 100 MG/1
100 TABLET, EXTENDED RELEASE ORAL DAILY
Status: DISCONTINUED | OUTPATIENT
Start: 2025-07-13 | End: 2025-07-14 | Stop reason: HOSPADM

## 2025-07-12 RX ORDER — IOPAMIDOL 755 MG/ML
75 INJECTION, SOLUTION INTRAVASCULAR
Status: COMPLETED | OUTPATIENT
Start: 2025-07-12 | End: 2025-07-12

## 2025-07-12 RX ORDER — HEPARIN SODIUM 5000 [USP'U]/ML
5000 INJECTION, SOLUTION INTRAVENOUS; SUBCUTANEOUS EVERY 8 HOURS SCHEDULED
Status: DISCONTINUED | OUTPATIENT
Start: 2025-07-12 | End: 2025-07-14 | Stop reason: HOSPADM

## 2025-07-12 RX ORDER — 0.9 % SODIUM CHLORIDE 0.9 %
30 INTRAVENOUS SOLUTION INTRAVENOUS ONCE
Status: COMPLETED | OUTPATIENT
Start: 2025-07-12 | End: 2025-07-12

## 2025-07-12 RX ORDER — LOSARTAN POTASSIUM 50 MG/1
100 TABLET ORAL DAILY
Status: DISCONTINUED | OUTPATIENT
Start: 2025-07-13 | End: 2025-07-14 | Stop reason: HOSPADM

## 2025-07-12 RX ORDER — SODIUM CHLORIDE 0.9 % (FLUSH) 0.9 %
5-40 SYRINGE (ML) INJECTION EVERY 12 HOURS SCHEDULED
Status: DISCONTINUED | OUTPATIENT
Start: 2025-07-12 | End: 2025-07-14 | Stop reason: HOSPADM

## 2025-07-12 RX ORDER — SODIUM CHLORIDE 0.9 % (FLUSH) 0.9 %
5-40 SYRINGE (ML) INJECTION PRN
Status: DISCONTINUED | OUTPATIENT
Start: 2025-07-12 | End: 2025-07-14 | Stop reason: HOSPADM

## 2025-07-12 RX ORDER — HYDROCHLOROTHIAZIDE 12.5 MG/1
6.25 TABLET ORAL DAILY
Status: DISCONTINUED | OUTPATIENT
Start: 2025-07-13 | End: 2025-07-13

## 2025-07-12 RX ORDER — SODIUM CHLORIDE 9 MG/ML
INJECTION, SOLUTION INTRAVENOUS PRN
Status: DISCONTINUED | OUTPATIENT
Start: 2025-07-12 | End: 2025-07-14 | Stop reason: HOSPADM

## 2025-07-12 RX ORDER — 0.9 % SODIUM CHLORIDE 0.9 %
500 INTRAVENOUS SOLUTION INTRAVENOUS ONCE
Status: COMPLETED | OUTPATIENT
Start: 2025-07-12 | End: 2025-07-13

## 2025-07-12 RX ORDER — BISOPROLOL FUMARATE AND HYDROCHLOROTHIAZIDE 10; 6.25 MG/1; MG/1
1 TABLET ORAL DAILY
Status: DISCONTINUED | OUTPATIENT
Start: 2025-07-13 | End: 2025-07-12 | Stop reason: CLARIF

## 2025-07-12 RX ORDER — ONDANSETRON 2 MG/ML
4 INJECTION INTRAMUSCULAR; INTRAVENOUS EVERY 6 HOURS PRN
Status: DISCONTINUED | OUTPATIENT
Start: 2025-07-12 | End: 2025-07-14 | Stop reason: HOSPADM

## 2025-07-12 RX ORDER — ACETAMINOPHEN 650 MG/1
650 SUPPOSITORY RECTAL EVERY 6 HOURS PRN
Status: DISCONTINUED | OUTPATIENT
Start: 2025-07-12 | End: 2025-07-14 | Stop reason: HOSPADM

## 2025-07-12 RX ADMIN — SODIUM CHLORIDE, PRESERVATIVE FREE 10 ML: 5 INJECTION INTRAVENOUS at 23:13

## 2025-07-12 RX ADMIN — IOPAMIDOL 75 ML: 755 INJECTION, SOLUTION INTRAVENOUS at 15:08

## 2025-07-12 RX ADMIN — SODIUM CHLORIDE 500 ML: 0.9 INJECTION, SOLUTION INTRAVENOUS at 23:12

## 2025-07-12 RX ADMIN — SODIUM CHLORIDE 1770 ML: 0.9 INJECTION, SOLUTION INTRAVENOUS at 13:42

## 2025-07-12 RX ADMIN — HEPARIN SODIUM 5000 UNITS: 5000 INJECTION INTRAVENOUS; SUBCUTANEOUS at 23:13

## 2025-07-12 ASSESSMENT — LIFESTYLE VARIABLES
HOW MANY STANDARD DRINKS CONTAINING ALCOHOL DO YOU HAVE ON A TYPICAL DAY: PATIENT DOES NOT DRINK
HOW OFTEN DO YOU HAVE A DRINK CONTAINING ALCOHOL: NEVER

## 2025-07-12 ASSESSMENT — PAIN - FUNCTIONAL ASSESSMENT: PAIN_FUNCTIONAL_ASSESSMENT: NONE - DENIES PAIN

## 2025-07-12 NOTE — ED NOTES
ED to Inpatient Handoff Report    Notified conor that electronic handoff available and patient ready for transport to room 414.    Safety Risks: None identified    Patient in Restraints: no    Constant Observer or Patient : no    Telemetry Monitoring Ordered: No          Order to transfer to unit without monitor: NO      Deterioration Index: 27.53    Vitals:    07/12/25 1548 07/12/25 1554 07/12/25 1559 07/12/25 1735   BP: 105/61      Pulse: 84 86 91 83   Resp: (!) 9 11 12 17   Temp:       TempSrc:       SpO2: 100% 100% 100%    Weight:       Height:           Opportunity for questions and clarification was provided.

## 2025-07-12 NOTE — PROGRESS NOTES
4 Eyes Skin Assessment     NAME:  Sravanthi Lr  YOB: 1955  MEDICAL RECORD NUMBER:  [id number]    The patient is being assessed for  Admission    I agree that at least one RN has performed a thorough Head to Toe Skin Assessment on the patient. ALL assessment sites listed below have been assessed.      Areas assessed by both nurses:    Head, Face, Ears, Shoulders, Back, Chest, Arms, Elbows, Hands, Sacrum. Buttock, Coccyx, Ischium, and Legs. Feet and Heels        Does the Patient have a Wound? No noted wound(s)       Job Prevention initiated by RN: YES  Wound Care Orders initiated by RN: No    Pressure Injury (Stage 1,2,3,4, Unstageable, DTI, NWPT, and Complex wounds) if present, place Wound referral order by RN under : No    New Ostomies, if present place, Ostomy referral order under : No     Nurse 1 eSignature: Electronically signed by Ena Camacho RN on 7/12/25 at 6:34 PM EDT    **SHARE this note so that the co-signing nurse can place an eSignature**    Nurse 2 eSignature: Electronically signed by MATT SCHULTZ RN on 7/12/25 at 6:35 PM EDT

## 2025-07-12 NOTE — ED PROVIDER NOTES
Department of Emergency Medicine     Written by: Won Lopes MD  Patient Name: Sravanthi Lr  Visit Date: 2025 12:45 PM  MRN: [id number]                   : 1955  ------------------------- CC-------------------------  Chief Complaint   Patient presents with    Diarrhea     Unable to tolerate any food, recent femur surgery ( memorial day ) spent time in rehab      ------------------------- HPI -------------------------    Sravanthi is a 69 y.o. year old female with history of recent femur fracture status post closed reduction internal fixation, osteoarthritis, CKD presents from home for evaluation of diarrhea.  She has had diarrhea for the past 3 weeks.  Patient reports she had a fall and had a femur fracture, had surgery and then was sent to rehab facility.  She was recently discharged from the facility on  after that had decreased appetite, nausea diarrhea, denies evidence of bleeding diarrhea and decreased appetite.    She currently lives at home by herself and has been able to ambulate around that she has a nonbearing boot on her right lower extremity, reports that she was able to use a wheelchair and crutches to get around the house.    There are no family/friends at bedside. History is provided by patient who is felt to be a good historian.     Nursing notes were all reviewed and agreed with or any disagreements were addressed in the HPI.    REVIEW OF SYSTEMS:  Review of Systems:   Please see HPI above. All bolded are positive. All un-bolded are negative.  Constitutional Symptoms: fever, chills, fatigue, generalized weakness, diaphoresis, increase in thirst, loss of appetite  Eyes: vision change   Ears, Nose, Mouth, Throat: hearing loss, nasal congestion, sores in the mouth  Cardiovascular: chest pain, chest heaviness, palpitations  Respiratory: shortness of breath, wheezing, coughing  Gastrointestinal: abdominal pain, nausea, vomiting, diarrhea, constipation, melena, hematochezia,

## 2025-07-12 NOTE — ED NOTES
Radiology Procedure Waiver   Name: Sravanthi Lr  : 1955  MRN: [id number]    Date:  25    Time: 2:26 PM EDT    Benefits of immediately proceeding with Radiology exam(s) without pre-testing outweigh the risks or are not indicated as specified below and therefore the following is/are being waived:    [] Pregnancy test   [] Patients LMP on-time and regular.   [] Patient had Tubal Ligation or has other Contraception Device.   [] Patient  is Menopausal or Premenarcheal.    [] Patient had Full or Partial Hysterectomy.    [] Protocol for Iodine allergy    [] MRI Questionnaire     [x] BUN/Creatinine   [] Patient age w/no hx of renal dysfunction.   [] Patient on Dialysis.   [] Recent Normal Labs.  Electronically signed by Won Lopes MD on 25 at 2:26 PM EDT          Benefit outweigh risks

## 2025-07-12 NOTE — H&P
[facility] Hospitalist Group History and Physical      CHIEF COMPLAINT:  Diarrhea     History of Present Illness:    Patient is a 69 y.o. F w/ hx of recent R femur fx s/p ORIF (late May), OA, CKD, p/w 3 wks of watery diarrhea and ? PO intake. Reports fall on Memorial Day, underwent ORIF, then sent to rehab. D/C’d home on 6/18. Diarrhea began ~2 wks ago, watery, no hematochezia, and has resolved since presentation. She was taking colchicine more frequently (BID) after surgery for presumed gout, which may have contributed to diarrhea. Denies fevers, CP, or SOB. Ambulatory at home using crutches w/ NWB boot on RLE. ER labs notable for Na 134, BUN/Cr 48/1.7 (baseline), AG 18, Hgb 12.4, WBC 4.8, lipase 163, TSH normal. CT A/P: no acute findings; mild splenomegaly, small umbilical hernia. EKG: NSR, old anterior infarct, improved STs vs prior. Will r/o infectious cause while holding colchicine.       REVIEW OF SYSTEMS:  A comprehensive review of systems was negative except for: what is in the HPI      PMH:  Past Medical History:   Diagnosis Date    Diabetes mellitus (HCC)     poorly controlled    Hypertension        Surgical History:  Past Surgical History:   Procedure Laterality Date    APPENDECTOMY      CARPAL TUNNEL RELEASE      COLONOSCOPY N/A 7/19/2019    COLONOSCOPY DIAGNOSTIC performed by VARINDER Khan MD at [facility] ENDOSCOPY    DEBRIDEMENT Left 3/11/2024    CYSTOSCOPY RETROGRADE PYELOGRAM LEFT STENT INSERTION performed by Shawn Boykin MD at [facility] OR    FEMUR SURGERY Right 5/26/2025    FEMUR OPEN REDUCTION INTERNAL FIXATION performed by Ramy Canales DO at [facility] OR    FEMUR SURGERY Right 5/27/2025    RIGHT DISTAL FEMUR HARDWARE REVISION performed by Ramy Canales DO at [facility] OR    FRACTURE SURGERY      rt ankle    KNEE SURGERY      arthroscopic    UPPER GASTROINTESTINAL ENDOSCOPY N/A 7/16/2019    EGD CONTROL HEMORRHAGE performed by VARINDER Khan MD at [facility] ENDOSCOPY    UPPER GASTROINTESTINAL

## 2025-07-13 PROBLEM — E87.20 ACIDOSIS: Status: ACTIVE | Noted: 2025-07-13

## 2025-07-13 PROBLEM — N17.9 AKI (ACUTE KIDNEY INJURY): Status: ACTIVE | Noted: 2025-07-13

## 2025-07-13 PROBLEM — E11.9 CONTROLLED TYPE 2 DIABETES MELLITUS WITHOUT COMPLICATION (HCC): Status: ACTIVE | Noted: 2025-07-13

## 2025-07-13 LAB
ANION GAP SERPL CALCULATED.3IONS-SCNC: 13 MMOL/L (ref 7–16)
BASOPHILS # BLD: 0.04 K/UL (ref 0–0.2)
BASOPHILS NFR BLD: 1 % (ref 0–2)
BUN SERPL-MCNC: 28 MG/DL (ref 8–23)
CALCIUM SERPL-MCNC: 8.6 MG/DL (ref 8.8–10.2)
CHLORIDE SERPL-SCNC: 110 MMOL/L (ref 98–107)
CO2 SERPL-SCNC: 18 MMOL/L (ref 22–29)
CREAT SERPL-MCNC: 1.2 MG/DL (ref 0.5–1)
EOSINOPHIL # BLD: 0.18 K/UL (ref 0.05–0.5)
EOSINOPHILS RELATIVE PERCENT: 4 % (ref 0–6)
ERYTHROCYTE [DISTWIDTH] IN BLOOD BY AUTOMATED COUNT: 14.1 % (ref 11.5–15)
GFR, ESTIMATED: 51 ML/MIN/1.73M2
GLUCOSE BLD-MCNC: 133 MG/DL (ref 74–99)
GLUCOSE BLD-MCNC: 168 MG/DL (ref 74–99)
GLUCOSE BLD-MCNC: 170 MG/DL (ref 74–99)
GLUCOSE BLD-MCNC: 178 MG/DL (ref 74–99)
GLUCOSE SERPL-MCNC: 123 MG/DL (ref 74–99)
HCT VFR BLD AUTO: 31.2 % (ref 34–48)
HGB BLD-MCNC: 10.6 G/DL (ref 11.5–15.5)
IMM GRANULOCYTES # BLD AUTO: 0.03 K/UL (ref 0–0.58)
IMM GRANULOCYTES NFR BLD: 1 % (ref 0–5)
LYMPHOCYTES NFR BLD: 0.77 K/UL (ref 1.5–4)
LYMPHOCYTES RELATIVE PERCENT: 16 % (ref 20–42)
MAGNESIUM SERPL-MCNC: 1.8 MG/DL (ref 1.6–2.4)
MCH RBC QN AUTO: 30.2 PG (ref 26–35)
MCHC RBC AUTO-ENTMCNC: 34 G/DL (ref 32–34.5)
MCV RBC AUTO: 88.9 FL (ref 80–99.9)
MONOCYTES NFR BLD: 0.45 K/UL (ref 0.1–0.95)
MONOCYTES NFR BLD: 9 % (ref 2–12)
NEUTROPHILS NFR BLD: 69 % (ref 43–80)
NEUTS SEG NFR BLD: 3.32 K/UL (ref 1.8–7.3)
PHOSPHATE SERPL-MCNC: 2.7 MG/DL (ref 2.5–4.5)
PLATELET # BLD AUTO: 134 K/UL (ref 130–450)
PMV BLD AUTO: 11.6 FL (ref 7–12)
POTASSIUM SERPL-SCNC: 3.3 MMOL/L (ref 3.5–5.1)
RBC # BLD AUTO: 3.51 M/UL (ref 3.5–5.5)
SODIUM SERPL-SCNC: 140 MMOL/L (ref 136–145)
WBC OTHER # BLD: 4.8 K/UL (ref 4.5–11.5)

## 2025-07-13 PROCEDURE — 2060000000 HC ICU INTERMEDIATE R&B

## 2025-07-13 PROCEDURE — 80048 BASIC METABOLIC PNL TOTAL CA: CPT

## 2025-07-13 PROCEDURE — 99232 SBSQ HOSP IP/OBS MODERATE 35: CPT | Performed by: INTERNAL MEDICINE

## 2025-07-13 PROCEDURE — 2500000003 HC RX 250 WO HCPCS: Performed by: INTERNAL MEDICINE

## 2025-07-13 PROCEDURE — 83735 ASSAY OF MAGNESIUM: CPT

## 2025-07-13 PROCEDURE — 97530 THERAPEUTIC ACTIVITIES: CPT

## 2025-07-13 PROCEDURE — 85025 COMPLETE CBC W/AUTO DIFF WBC: CPT

## 2025-07-13 PROCEDURE — 6370000000 HC RX 637 (ALT 250 FOR IP): Performed by: INTERNAL MEDICINE

## 2025-07-13 PROCEDURE — 82962 GLUCOSE BLOOD TEST: CPT

## 2025-07-13 PROCEDURE — 6360000002 HC RX W HCPCS: Performed by: INTERNAL MEDICINE

## 2025-07-13 PROCEDURE — 84100 ASSAY OF PHOSPHORUS: CPT

## 2025-07-13 PROCEDURE — 2580000003 HC RX 258: Performed by: INTERNAL MEDICINE

## 2025-07-13 PROCEDURE — 97161 PT EVAL LOW COMPLEX 20 MIN: CPT

## 2025-07-13 RX ORDER — POTASSIUM CHLORIDE 1500 MG/1
40 TABLET, EXTENDED RELEASE ORAL ONCE
Status: COMPLETED | OUTPATIENT
Start: 2025-07-13 | End: 2025-07-13

## 2025-07-13 RX ORDER — SODIUM CHLORIDE 9 MG/ML
INJECTION, SOLUTION INTRAVENOUS CONTINUOUS
Status: DISCONTINUED | OUTPATIENT
Start: 2025-07-13 | End: 2025-07-14

## 2025-07-13 RX ADMIN — HEPARIN SODIUM 5000 UNITS: 5000 INJECTION INTRAVENOUS; SUBCUTANEOUS at 06:08

## 2025-07-13 RX ADMIN — POTASSIUM CHLORIDE 40 MEQ: 1500 TABLET, EXTENDED RELEASE ORAL at 18:51

## 2025-07-13 RX ADMIN — HEPARIN SODIUM 5000 UNITS: 5000 INJECTION INTRAVENOUS; SUBCUTANEOUS at 21:54

## 2025-07-13 RX ADMIN — SODIUM CHLORIDE: 0.9 INJECTION, SOLUTION INTRAVENOUS at 23:09

## 2025-07-13 RX ADMIN — SODIUM CHLORIDE, PRESERVATIVE FREE 10 ML: 5 INJECTION INTRAVENOUS at 08:23

## 2025-07-13 RX ADMIN — HEPARIN SODIUM 5000 UNITS: 5000 INJECTION INTRAVENOUS; SUBCUTANEOUS at 15:16

## 2025-07-13 RX ADMIN — SODIUM CHLORIDE, PRESERVATIVE FREE 10 ML: 5 INJECTION INTRAVENOUS at 19:59

## 2025-07-13 RX ADMIN — ACETAMINOPHEN 650 MG: 325 TABLET ORAL at 20:08

## 2025-07-13 ASSESSMENT — PAIN DESCRIPTION - LOCATION: LOCATION: FOOT

## 2025-07-13 ASSESSMENT — PAIN - FUNCTIONAL ASSESSMENT: PAIN_FUNCTIONAL_ASSESSMENT: ACTIVITIES ARE NOT PREVENTED

## 2025-07-13 ASSESSMENT — PAIN DESCRIPTION - ORIENTATION: ORIENTATION: LEFT;RIGHT

## 2025-07-13 ASSESSMENT — PAIN SCALES - GENERAL: PAINLEVEL_OUTOF10: 4

## 2025-07-13 ASSESSMENT — PAIN DESCRIPTION - DESCRIPTORS: DESCRIPTORS: BURNING

## 2025-07-13 NOTE — PLAN OF CARE
Problem: Chronic Conditions and Co-morbidities  Goal: Patient's chronic conditions and co-morbidity symptoms are monitored and maintained or improved  Outcome: Progressing     Problem: Discharge Planning  Goal: Discharge to home or other facility with appropriate resources  Outcome: Progressing     Problem: ABCDS Injury Assessment  Goal: Absence of physical injury  Outcome: Progressing     Problem: Skin/Tissue Integrity  Goal: Skin integrity remains intact  Description: 1.  Monitor for areas of redness and/or skin breakdown  2.  Assess vascular access sites hourly  3.  Every 4-6 hours minimum:  Change oxygen saturation probe site  4.  Every 4-6 hours:  If on nasal continuous positive airway pressure, respiratory therapy assess nares and determine need for appliance change or resting period  Outcome: Progressing     Problem: Safety - Adult  Goal: Free from fall injury  Outcome: Progressing     Problem: Gastrointestinal - Adult  Goal: Maintains or returns to baseline bowel function  Outcome: Progressing     Problem: Metabolic/Fluid and Electrolytes - Adult  Goal: Electrolytes maintained within normal limits  Outcome: Progressing  Goal: Glucose maintained within prescribed range  Outcome: Progressing     Problem: Metabolic/Fluid and Electrolytes - Adult  Goal: Hemodynamic stability and optimal renal function maintained  Outcome: Not Progressing  Flowsheets (Taken 7/12/2025 5770)  Hemodynamic stability and optimal renal function maintained:   Monitor labs and assess for signs and symptoms of volume excess or deficit   Monitor intake, output and patient weight   Monitor response to interventions for patient's volume status, including labs, urine output, blood pressure (other measures as available)

## 2025-07-13 NOTE — PROGRESS NOTES
Physical Therapy  Facility/Department: [address] INTERNAL MEDICINE 2  Physical Therapy Initial Assessment    Name: Sravanthi Lr  : 1955  MRN: [id number]  Date of Service: 2025    Patient Diagnosis(es): The primary encounter diagnosis was Dehydration. Diagnoses of Diarrhea, unspecified type and Splenomegaly were also pertinent to this visit.  Past Medical History:  has a past medical history of Diabetes mellitus (HCC) and Hypertension.  Past Surgical History:  has a past surgical history that includes knee surgery; fracture surgery; Carpal tunnel release; Appendectomy; Upper gastrointestinal endoscopy (N/A, 2019); Upper gastrointestinal endoscopy (N/A, 2019); Colonoscopy (N/A, 2019); Wound debridement (Left, 3/11/2024); Femur Surgery (Right, 2025); and Femur Surgery (Right, 2025).      Referring provider:  Tello Arredondo DO    PT Order:  PT eval and treat     Evaluating PT:  Lissa Soler PT, DPT PT [id number]    Room #:  0414/0414-A  Diagnosis:  Dehydration [E86.0]  Past Surgical history:  Open reduction internal fixation right periprosthetic supracondylar femur fracture (2025); Revision open reduction internal fixation right distal femur fracture (2025)   Precautions:  fall risk, NWB right LE, hinged knee brace locked at 90 degrees per pt report  Equipment Needs:  none.  Pt owns crutched and w/c.     SUBJECTIVE:    Pt lives alone in a 3 story home with 3 stairs to enter.   Pt reported she has been staying in the first floor with bath on the first floor.  Pt reported she mostly uses a w/c for mobility but uses crutches to ambulate to the commode.  Pt reported her neighbor comes to assist her with meals daily.       OBJECTIVE:   Initial Evaluation  Date:  Treatment Short Term/ Long Term   Goals   Was pt agreeable to Eval/treatment? yes     Does pt have pain? None reported     Bed Mobility  Rolling: SBA  Supine to sit: SBA  Sit to supine: NT  Scooting: SBA to sitting EOB

## 2025-07-13 NOTE — PROGRESS NOTES
Pharmacy Note    Sravanthi Lr was ordered Jardiance (for Type 2 Diabetes).  Per the [facility] Formulary Committee Policy, this medication is non-formulary and not stocked by the Pharmacy.  The medication can be reordered at discharge.

## 2025-07-14 VITALS
HEART RATE: 87 BPM | HEIGHT: 61 IN | BODY MASS INDEX: 24.55 KG/M2 | WEIGHT: 130 LBS | DIASTOLIC BLOOD PRESSURE: 69 MMHG | OXYGEN SATURATION: 100 % | SYSTOLIC BLOOD PRESSURE: 107 MMHG | TEMPERATURE: 97.5 F | RESPIRATION RATE: 20 BRPM

## 2025-07-14 LAB
ALBUMIN SERPL-MCNC: 3.5 G/DL (ref 3.5–5.2)
ALP SERPL-CCNC: 90 U/L (ref 35–104)
ALT SERPL-CCNC: 31 U/L (ref 0–35)
ANION GAP SERPL CALCULATED.3IONS-SCNC: 12 MMOL/L (ref 7–16)
AST SERPL-CCNC: 28 U/L (ref 0–35)
BASOPHILS # BLD: 0.03 K/UL (ref 0–0.2)
BASOPHILS NFR BLD: 1 % (ref 0–2)
BILIRUB SERPL-MCNC: 0.6 MG/DL (ref 0–1.2)
BUN SERPL-MCNC: 21 MG/DL (ref 8–23)
CALCIUM SERPL-MCNC: 8.6 MG/DL (ref 8.8–10.2)
CHLORIDE SERPL-SCNC: 109 MMOL/L (ref 98–107)
CO2 SERPL-SCNC: 18 MMOL/L (ref 22–29)
CREAT SERPL-MCNC: 1.1 MG/DL (ref 0.5–1)
EOSINOPHIL # BLD: 0.14 K/UL (ref 0.05–0.5)
EOSINOPHILS RELATIVE PERCENT: 5 % (ref 0–6)
ERYTHROCYTE [DISTWIDTH] IN BLOOD BY AUTOMATED COUNT: 14 % (ref 11.5–15)
GFR, ESTIMATED: 56 ML/MIN/1.73M2
GLUCOSE BLD-MCNC: 116 MG/DL (ref 74–99)
GLUCOSE BLD-MCNC: 162 MG/DL (ref 74–99)
GLUCOSE SERPL-MCNC: 102 MG/DL (ref 74–99)
HCT VFR BLD AUTO: 29.5 % (ref 34–48)
HGB BLD-MCNC: 9.4 G/DL (ref 11.5–15.5)
IMM GRANULOCYTES # BLD AUTO: <0.03 K/UL (ref 0–0.58)
IMM GRANULOCYTES NFR BLD: 0 % (ref 0–5)
LYMPHOCYTES NFR BLD: 0.9 K/UL (ref 1.5–4)
LYMPHOCYTES RELATIVE PERCENT: 29 % (ref 20–42)
MAGNESIUM SERPL-MCNC: 1.8 MG/DL (ref 1.6–2.4)
MCH RBC QN AUTO: 29.5 PG (ref 26–35)
MCHC RBC AUTO-ENTMCNC: 31.9 G/DL (ref 32–34.5)
MCV RBC AUTO: 92.5 FL (ref 80–99.9)
MONOCYTES NFR BLD: 0.32 K/UL (ref 0.1–0.95)
MONOCYTES NFR BLD: 10 % (ref 2–12)
NEUTROPHILS NFR BLD: 55 % (ref 43–80)
NEUTS SEG NFR BLD: 1.7 K/UL (ref 1.8–7.3)
PHOSPHATE SERPL-MCNC: 2.6 MG/DL (ref 2.5–4.5)
PLATELET # BLD AUTO: 112 K/UL (ref 130–450)
PMV BLD AUTO: 11.4 FL (ref 7–12)
POTASSIUM SERPL-SCNC: 3.4 MMOL/L (ref 3.5–5.1)
PROT SERPL-MCNC: 5.7 G/DL (ref 6.4–8.3)
RBC # BLD AUTO: 3.19 M/UL (ref 3.5–5.5)
SODIUM SERPL-SCNC: 139 MMOL/L (ref 136–145)
WBC OTHER # BLD: 3.1 K/UL (ref 4.5–11.5)

## 2025-07-14 PROCEDURE — 6360000002 HC RX W HCPCS: Performed by: INTERNAL MEDICINE

## 2025-07-14 PROCEDURE — 84100 ASSAY OF PHOSPHORUS: CPT

## 2025-07-14 PROCEDURE — 97165 OT EVAL LOW COMPLEX 30 MIN: CPT

## 2025-07-14 PROCEDURE — 97535 SELF CARE MNGMENT TRAINING: CPT

## 2025-07-14 PROCEDURE — 82962 GLUCOSE BLOOD TEST: CPT

## 2025-07-14 PROCEDURE — 80053 COMPREHEN METABOLIC PANEL: CPT

## 2025-07-14 PROCEDURE — 83735 ASSAY OF MAGNESIUM: CPT

## 2025-07-14 PROCEDURE — 99239 HOSP IP/OBS DSCHRG MGMT >30: CPT | Performed by: INTERNAL MEDICINE

## 2025-07-14 PROCEDURE — 85025 COMPLETE CBC W/AUTO DIFF WBC: CPT

## 2025-07-14 PROCEDURE — 6370000000 HC RX 637 (ALT 250 FOR IP): Performed by: INTERNAL MEDICINE

## 2025-07-14 RX ORDER — POTASSIUM CHLORIDE 1500 MG/1
40 TABLET, EXTENDED RELEASE ORAL ONCE
Status: COMPLETED | OUTPATIENT
Start: 2025-07-14 | End: 2025-07-14

## 2025-07-14 RX ADMIN — POTASSIUM CHLORIDE 40 MEQ: 1500 TABLET, EXTENDED RELEASE ORAL at 15:16

## 2025-07-14 RX ADMIN — HEPARIN SODIUM 5000 UNITS: 5000 INJECTION INTRAVENOUS; SUBCUTANEOUS at 15:16

## 2025-07-14 RX ADMIN — HEPARIN SODIUM 5000 UNITS: 5000 INJECTION INTRAVENOUS; SUBCUTANEOUS at 06:01

## 2025-07-14 NOTE — DISCHARGE SUMMARY
[facility] Hospitalist       Hospitalist Physician Discharge Summary       Maurice Dias JEYSON, DO  [address] [address]  [phone]    Call today        Activity level: as delfina    Diet: ADULT DIET; Regular; 5 carb choices (75 gm/meal)    Dispo:home    Condition at discharge: fair        Patient ID:  Sravanthi Lr  [id number]  69 y.o.  1955    Admit date: 7/12/2025    Discharge date and time:  7/14/2025  5:33 PM    Admission Diagnoses: Principal Problem:    Dehydration  Active Problems:    Diarrhea of presumed infectious origin    Controlled type 2 diabetes mellitus without complication (HCC)    KRANTHI (acute kidney injury)    Acidosis  Resolved Problems:    * No resolved hospital problems. *      Discharge Diagnoses: Principal Problem:    Dehydration  Active Problems:    Diarrhea of presumed infectious origin    Controlled type 2 diabetes mellitus without complication (HCC)    KRANTHI (acute kidney injury)    Acidosis  Resolved Problems:    * No resolved hospital problems. *    Diarrhea possibly from colchicine  Dm type 2 controlled  Htn  Hypokalemia   Kranthi  Acidosis  Dehydration        Consults:  IP CONSULT TO HOSPITALIST  IP CONSULT TO SPIRITUAL SERVICES  IP CONSULT TO DIETITIAN    Procedures: none    Hospital Course: Patient was admitted with Dehydration [E86.0]. Patient is a 69 y.o. F w/ hx of recent R femur fx s/p ORIF (late May), OA, CKD, p/w 3 wks of watery diarrhea and ? PO intake. Reports fall on [facility] Day, underwent ORIF, then sent to rehab. D/C’d home on 6/18. Diarrhea began ~2 wks ago, watery, no hematochezia, and has resolved since presentation. She was taking colchicine more frequently (BID) after surgery for presumed gout, which may have contributed to diarrhea. Denies fevers, CP, or SOB. Ambulatory at home using crutches w/ NWB boot on RLE. ER labs notable for Na 134, BUN/Cr 48/1.7 (baseline), AG 18, Hgb 12.4, WBC 4.8, lipase 163, TSH normal. CT A/P: no acute

## 2025-07-14 NOTE — PROGRESS NOTES
Spiritual Health History and Assessment/Progress Note  [facility]     Encounter, Rituals, Rites and Sacraments,  ,  ,      Name: Sravanthi Lr MRN: [id number]    Age: 69 y.o.     Sex: female   Language: English   Latter day: Evangelical   Dehydration     Date: 7/14/2025                           Spiritual Assessment began in [address] INTERNAL MEDICINE 2        Referral/Consult From: Rounding   Encounter Overview/Reason:  Encounter, Rituals, Rites and Sacraments  Service Provided For: Patient    Inga, Belief, Meaning:   Patient is connected with a inga tradition or spiritual practice  Family/Friends No family/friends present      Importance and Influence:  Patient has no beliefs influential to healthcare decision-making identified during this visit  Family/Friends No family/friends present    Community:  Patient feels well-supported. Support system includes: Friends and Extended family  Family/Friends No family/friends present    Assessment and Plan of Care:     Patient Interventions include: Facilitated expression of thoughts and feelings, Affirmed coping skills/support systems, and Provided sacramental/Denominational ritual  Family/Friends Interventions include: No family/friends present    Patient Plan of Care: Spiritual Care available upon further referral  Family/Friends Plan of Care: Spiritual Care available upon further referral    Electronically signed by Chaplain Vanessa on 7/14/2025 at 3:57 PM

## 2025-07-14 NOTE — PROGRESS NOTES
Occupational Therapy  OCCUPATIONAL THERAPY INITIAL EVALUATION    [facility]   [address], [place]         Date:2025                                                  Patient Name: Sravanthi Lr    MRN: [id number]    : 1955    Room: [address] [place] [address]      Evaluating OT: Pooja Galvan OTR/L [id number]       Referring Provider:Ben Link MD      Specific Provider Orders/Date: OT eval and treat 25      Diagnosis: Dehydration     Surgery: ORIF of R Femur 25      Pertinent Medical History: DM,HTN, R TKA         Precautions:  Fall Risk, NWB RLE , RLE Hinged knee brace locked at 90 degrees    Assessment of current deficits:    [x] Functional mobility  [x]ADLs  [x] Strength               []Cognition    [x] Functional transfers   [x] IADLs         [] Safety Awareness   [x]Endurance    [] Fine Coordination              [x] Balance      [] Vision/perception   []Sensation     []Gross Motor Coordination  [] ROM  [] Delirium                   [] Motor Control     OT PLAN OF CARE   OT POC based on physician orders, patient diagnosis and results of clinical assessment    Frequency/Duration 2-5 days/wk for 2-4 weeks PRN   Specific OT Treatment Interventions to include:   * Instruction/training on adapted ADL techniques and AE recommendations to increase functional independence within precautions       * Training on energy conservation strategies, correct breathing pattern and techniques to improve independence/tolerance for self-care routine  * Functional transfer/mobility training/DME recommendations for increased independence, safety, and fall prevention  * Patient/Family education to increase follow through with safety techniques and functional independence  * Recommendation of environmental modifications for increased safety with functional transfers/mobility and ADLs  * Therapeutic exercise to improve motor endurance, ROM, and functional

## 2025-07-14 NOTE — PROGRESS NOTES
Spiritual Health History and Assessment/Progress Note  [place]    Initial Encounter,  ,  ,      Name: Sravanthi Lr MRN: [id number]    Age: 69 y.o.     Sex: female   Language: English   Jain: Scientology   Dehydration     Date: 7/14/2025                           Spiritual Assessment began in [address] INTERNAL [facility] 2        Referral/Consult From: Nurse, Rounding   Encounter Overview/Reason: Initial Encounter  Service Provided For: Patient    Inga, Belief, Meaning:   Patient identifies as spiritual, is connected with a inga tradition or spiritual practice, and has beliefs or practices that help with coping during difficult times  Family/Friends No family/friends present      Importance and Influence:  Patient has no beliefs influential to healthcare decision-making identified during this visit  Family/Friends No family/friends present    Community:  Patient feels well-supported. Support system includes: Friends and Extended family  Family/Friends No family/friends present    Assessment and Plan of Care:     Patient Interventions include: Facilitated expression of thoughts and feelings, Affirmed coping skills/support systems, and Provided sacramental/Sikhism ritual  Family/Friends Interventions include: No family/friends present    Patient Plan of Care: Spiritual Care available upon further referral  Family/Friends Plan of Care: No family/friends present    Electronically signed by Chaplain Vickie on 7/14/2025 at 11:42 AM

## 2025-07-14 NOTE — DISCHARGE INSTR - COC
osteoarthritis M15.9    Stage 3a chronic kidney disease ([facility]) N18.31    Kidney stone N20.0    DKA, type 1, not at goal ([facility]) E10.10    Periprosthetic fracture around internal prosthetic right hip joint, initial encounter ([facility]) M97.01XA    Dehydration E86.0    Diarrhea of presumed infectious origin R19.7    Controlled type 2 diabetes mellitus without complication ([facility]) E11.9    KRANTHI (acute kidney injury) N17.9    Acidosis E87.20       Isolation/Infection:   Isolation            No Isolation          Patient Infection Status    None to display              Nurse Assessment:  Last Vital Signs: /67   Pulse 81   Temp 96.9 °F (36.1 °C) (Temporal)   Resp 18   Ht 1.549 m (5' 1\")   Wt 59 kg (130 lb)   SpO2 100%   BMI 24.56 kg/m²     Last documented pain score (0-10 scale): Pain Level: 4  Last Weight:   Wt Readings from Last 1 Encounters:   07/12/25 59 kg (130 lb)     Mental Status:  {IP PT MENTAL STATUS:20030}    IV Access:  { CAROL IV ACCESS:165486350}    Nursing Mobility/ADLs:  Walking   {CHP DME ADLs:681384529}  Transfer  {CHP DME ADLs:900088103}  Bathing  {CHP DME ADLs:957373791}  Dressing  {CHP DME ADLs:815246050}  Toileting  {CHP DME ADLs:207845987}  Feeding  {P DME ADLs:366617363}  Med Admin  {P DME ADLs:121953048}  Med Delivery   {[facility] MED Delivery:802128637}    Wound Care Documentation and Therapy:  Incision 05/26/25 Thigh Right (Active)   Number of days: 49        Elimination:  Continence:   Bowel: {YES / NO:19727}  Bladder: {YES / NO:19727}  Urinary Catheter: {Urinary Catheter:792032579}   Colostomy/Ileostomy/Ileal Conduit: {YES / NO:19727}       Date of Last BM: ***    Intake/Output Summary (Last 24 hours) at 7/14/2025 1136  Last data filed at 7/14/2025 1001  Gross per 24 hour   Intake 584 ml   Output 800 ml   Net -216 ml     I/O last 3 completed shifts:  In: 784 [P.O.:440; I.V.:344]  Out: 1050 [Urine:1050]    Safety Concerns:     { CAROL Safety Concerns:855207710}    Impairments/Disabilities:

## 2025-07-14 NOTE — PROGRESS NOTES
[facility] Hospitalist   Progress Note    Admitting Date and Time: 7/12/2025 12:45 PM  Admit Dx: Dehydration [E86.0]    Subjective:    Patient was admitted with Dehydration [E86.0]. Patient denies fever, chills, cp, sob, n/v.     losartan  100 mg Oral Daily    insulin lispro  0-4 Units SubCUTAneous 4x Daily AC & HS    sodium chloride flush  5-40 mL IntraVENous 2 times per day    heparin (porcine)  5,000 Units SubCUTAneous 3 times per day    metoprolol succinate  100 mg Oral Daily    And    hydroCHLOROthiazide  6.25 mg Oral Daily     sodium chloride flush, 5-40 mL, PRN  sodium chloride, , PRN  ondansetron, 4 mg, Q8H PRN   Or  ondansetron, 4 mg, Q6H PRN  polyethylene glycol, 17 g, Daily PRN  acetaminophen, 650 mg, Q6H PRN   Or  acetaminophen, 650 mg, Q6H PRN         Objective:    /64   Pulse 96   Temp 98.1 °F (36.7 °C) (Oral)   Resp 18   Ht 1.549 m (5' 1\")   Wt 59 kg (130 lb)   SpO2 100%   BMI 24.56 kg/m²   Skin: warm and dry, no rash or erythema  Pulmonary/Chest: clear to auscultation bilaterally- no wheezes, rales or rhonchi, normal air movement, no respiratory distress  Cardiovascular: rhythm reg at rate of 98  Abdomen: soft, non-tender, non-distended, normal bowel sounds, no masses or organomegaly  Extremities: no cyanosis, no clubbing, and no edema      Recent Labs     07/12/25  1337 07/13/25  0701   * 140   K 3.5 3.3*    110*   CO2 18* 18*   BUN 48* 28*   CREATININE 1.7* 1.2*   GLUCOSE 156* 123*   CALCIUM 9.7 8.6*       Recent Labs     07/12/25  1337 07/13/25  0701   WBC 4.8 4.8   RBC 4.32 3.51   HGB 12.4 10.6*   HCT 38.6 31.2*   MCV 89.4 88.9   MCH 28.7 30.2   MCHC 32.1 34.0   RDW 13.7 14.1    134   MPV 11.5 11.6       CBC with Differential:    Lab Results   Component Value Date/Time    WBC 4.8 07/13/2025 07:01 AM    RBC 3.51 07/13/2025 07:01 AM    HGB 10.6 07/13/2025 07:01 AM    HCT 31.2 07/13/2025 07:01 AM     07/13/2025 07:01 AM    MCV 88.9 07/13/2025

## 2025-07-14 NOTE — CARE COORDINATION
7/14/2025 Social Work Discharge Planning: Dehydration. NWB for 10-12 weeks for femur fx.This worker met with Pt to discuss  role and transition of care/discharge planning.Pt was recently discharged from [facility] and is from home alone. Pt has a wc and crutches. Pt also has a NWB boot for lower ext. AMPAC is 14/24. Pt states she wants to return home and is also refusing HHC. She said she will follow up with her ortho doctor on August 13 and then wants to pursue out pt rehab.  Pt needs a new PCP so SW made a referral to Patient Access liaison. Electronically signed by BARBARA Rivers on 7/14/2025 at 12:42 PM

## 2025-07-14 NOTE — ACP (ADVANCE CARE PLANNING)
7/14/2025 Advance Care Planning   Healthcare Decision Maker:      Click here to complete Healthcare Decision Makers including selection of the Healthcare Decision Maker Relationship (ie \"Primary\").  {Select if Healthcare Decision Makers in ACP Activity was empty/incomplete (Optional):487573843}       {If the relationship to the patient does NOT follow our state's Next of Kin hierarchy, the patient MUST complete an ACP Document to allow him/her to act on the patient's behalf. (Optional):068032596}

## 2025-07-15 LAB
EKG ATRIAL RATE: 90 BPM
EKG P AXIS: 38 DEGREES
EKG P-R INTERVAL: 188 MS
EKG Q-T INTERVAL: 384 MS
EKG QRS DURATION: 84 MS
EKG QTC CALCULATION (BAZETT): 469 MS
EKG R AXIS: 36 DEGREES
EKG T AXIS: 39 DEGREES
EKG VENTRICULAR RATE: 90 BPM

## 2025-07-15 PROCEDURE — 93010 ELECTROCARDIOGRAM REPORT: CPT | Performed by: INTERNAL MEDICINE

## 2025-07-20 ENCOUNTER — APPOINTMENT (OUTPATIENT)
Dept: GENERAL RADIOLOGY | Age: 70
End: 2025-07-20
Payer: MEDICARE

## 2025-07-20 ENCOUNTER — HOSPITAL ENCOUNTER (EMERGENCY)
Age: 70
Discharge: HOME | End: 2025-07-20
Attending: EMERGENCY MEDICINE
Payer: MEDICARE

## 2025-07-20 ENCOUNTER — APPOINTMENT (OUTPATIENT)
Dept: ULTRASOUND IMAGING | Age: 70
End: 2025-07-20
Payer: MEDICARE

## 2025-07-20 VITALS
WEIGHT: 130 LBS | RESPIRATION RATE: 17 BRPM | DIASTOLIC BLOOD PRESSURE: 64 MMHG | TEMPERATURE: 98 F | OXYGEN SATURATION: 99 % | BODY MASS INDEX: 25.52 KG/M2 | HEART RATE: 89 BPM | SYSTOLIC BLOOD PRESSURE: 125 MMHG | HEIGHT: 60 IN

## 2025-07-20 DIAGNOSIS — M25.562 ACUTE PAIN OF LEFT KNEE: Primary | ICD-10-CM

## 2025-07-20 DIAGNOSIS — R60.0 PERIPHERAL EDEMA: ICD-10-CM

## 2025-07-20 LAB
ALBUMIN SERPL-MCNC: 3.8 G/DL (ref 3.5–5.2)
ALP SERPL-CCNC: 93 U/L (ref 35–104)
ALT SERPL-CCNC: 31 U/L (ref 0–35)
ANION GAP SERPL CALCULATED.3IONS-SCNC: 12 MMOL/L (ref 7–16)
AST SERPL-CCNC: 20 U/L (ref 0–35)
BASOPHILS # BLD: 0.03 K/UL (ref 0–0.2)
BASOPHILS NFR BLD: 1 % (ref 0–2)
BILIRUB SERPL-MCNC: 1.1 MG/DL (ref 0–1.2)
BNP SERPL-MCNC: 951 PG/ML (ref 0–125)
BUN SERPL-MCNC: 23 MG/DL (ref 8–23)
CALCIUM SERPL-MCNC: 9.1 MG/DL (ref 8.8–10.2)
CHLORIDE SERPL-SCNC: 105 MMOL/L (ref 98–107)
CO2 SERPL-SCNC: 21 MMOL/L (ref 22–29)
CREAT SERPL-MCNC: 1.1 MG/DL (ref 0.5–1)
EOSINOPHIL # BLD: 0.17 K/UL (ref 0.05–0.5)
EOSINOPHILS RELATIVE PERCENT: 3 % (ref 0–6)
ERYTHROCYTE [DISTWIDTH] IN BLOOD BY AUTOMATED COUNT: 14.7 % (ref 11.5–15)
GFR, ESTIMATED: 53 ML/MIN/1.73M2
GLUCOSE SERPL-MCNC: 292 MG/DL (ref 74–99)
HCT VFR BLD AUTO: 29.3 % (ref 34–48)
HGB BLD-MCNC: 9.5 G/DL (ref 11.5–15.5)
IMM GRANULOCYTES # BLD AUTO: 0.03 K/UL (ref 0–0.58)
IMM GRANULOCYTES NFR BLD: 1 % (ref 0–5)
LYMPHOCYTES NFR BLD: 0.73 K/UL (ref 1.5–4)
LYMPHOCYTES RELATIVE PERCENT: 12 % (ref 20–42)
MCH RBC QN AUTO: 29.8 PG (ref 26–35)
MCHC RBC AUTO-ENTMCNC: 32.4 G/DL (ref 32–34.5)
MCV RBC AUTO: 91.8 FL (ref 80–99.9)
MONOCYTES NFR BLD: 0.57 K/UL (ref 0.1–0.95)
MONOCYTES NFR BLD: 9 % (ref 2–12)
NEUTROPHILS NFR BLD: 75 % (ref 43–80)
NEUTS SEG NFR BLD: 4.56 K/UL (ref 1.8–7.3)
PLATELET # BLD AUTO: 155 K/UL (ref 130–450)
PMV BLD AUTO: 10.4 FL (ref 7–12)
POTASSIUM SERPL-SCNC: 3.9 MMOL/L (ref 3.5–5.1)
PROT SERPL-MCNC: 6.4 G/DL (ref 6.4–8.3)
RBC # BLD AUTO: 3.19 M/UL (ref 3.5–5.5)
SODIUM SERPL-SCNC: 138 MMOL/L (ref 136–145)
URATE SERPL-MCNC: 6.3 MG/DL (ref 2.4–5.7)
WBC OTHER # BLD: 6.1 K/UL (ref 4.5–11.5)

## 2025-07-20 PROCEDURE — 80053 COMPREHEN METABOLIC PANEL: CPT

## 2025-07-20 PROCEDURE — 6370000000 HC RX 637 (ALT 250 FOR IP): Performed by: EMERGENCY MEDICINE

## 2025-07-20 PROCEDURE — 84550 ASSAY OF BLOOD/URIC ACID: CPT

## 2025-07-20 PROCEDURE — 96374 THER/PROPH/DIAG INJ IV PUSH: CPT

## 2025-07-20 PROCEDURE — 73562 X-RAY EXAM OF KNEE 3: CPT

## 2025-07-20 PROCEDURE — 99284 EMERGENCY DEPT VISIT MOD MDM: CPT

## 2025-07-20 PROCEDURE — 85025 COMPLETE CBC W/AUTO DIFF WBC: CPT

## 2025-07-20 PROCEDURE — 93971 EXTREMITY STUDY: CPT

## 2025-07-20 PROCEDURE — 6360000002 HC RX W HCPCS

## 2025-07-20 PROCEDURE — 83880 ASSAY OF NATRIURETIC PEPTIDE: CPT

## 2025-07-20 RX ORDER — DEXAMETHASONE SODIUM PHOSPHATE 10 MG/ML
8 INJECTION, SOLUTION INTRAMUSCULAR; INTRAVENOUS ONCE
Status: COMPLETED | OUTPATIENT
Start: 2025-07-20 | End: 2025-07-20

## 2025-07-20 RX ORDER — OXYCODONE AND ACETAMINOPHEN 5; 325 MG/1; MG/1
2 TABLET ORAL ONCE
Refills: 0 | Status: COMPLETED | OUTPATIENT
Start: 2025-07-20 | End: 2025-07-20

## 2025-07-20 RX ORDER — OXYCODONE AND ACETAMINOPHEN 5; 325 MG/1; MG/1
1 TABLET ORAL EVERY 6 HOURS PRN
Qty: 6 TABLET | Refills: 0 | Status: SHIPPED | OUTPATIENT
Start: 2025-07-20 | End: 2025-07-23

## 2025-07-20 RX ADMIN — OXYCODONE AND ACETAMINOPHEN 2 TABLET: 5; 325 TABLET ORAL at 16:15

## 2025-07-20 RX ADMIN — DEXAMETHASONE SODIUM PHOSPHATE 8 MG: 10 INJECTION, SOLUTION INTRAMUSCULAR; INTRAVENOUS at 15:38

## 2025-07-20 ASSESSMENT — PAIN SCALES - GENERAL: PAINLEVEL_OUTOF10: 10

## 2025-07-20 ASSESSMENT — PAIN DESCRIPTION - ORIENTATION: ORIENTATION: LEFT

## 2025-07-20 ASSESSMENT — PAIN DESCRIPTION - LOCATION: LOCATION: KNEE

## 2025-07-20 NOTE — ED PROVIDER NOTES
[facility] EMERGENCY DEPARTMENT  EMERGENCY DEPARTMENT ENCOUNTER        Pt Name: Sravanthi Lr  MRN: [id number]  Birthdate 1955  Date of evaluation: 7/20/2025  Provider: Magaly Freeman MD  PCP: No primary care provider on file.  Note Started: 2:48 PM EDT 7/20/25    CHIEF COMPLAINT       Chief Complaint   Patient presents with    Leg Pain     Patient has left knee pain, swelling, and unable to bear weight x 3 days. Recently discharged from [facility] for dehydration. Also recent right femur fx.     Leg Swelling     Bilateral leg swelling       HISTORY OF PRESENT ILLNESS: 1 or more Elements   History From: Patient    Limitations to history : None    Sravanthi Lr is a 69 y.o. female who presents for leg swelling beginning 3 days ago.  Patient reports she was recently admitted for dehydration; patient states she was given IV fluids during admission and believes her legs have been swollen since.  She states she has also not been taking her blood pressure medication which includes some diuretic properties.  Patient has a recent right femur fracture s/p repair.  She has a brace in place to her right leg and has been relying on her left leg more. She complains of pain to her left knee. Patient is concerned she may have a blood clot in her left leg and would like evaluated.  Non known injury or fall. Patient otherwise denies any fever, chills, headache, shortness of breath, chest pain, abdominal pain, nausea, vomiting. No hx of CHF.     Nursing Notes were all reviewed and agreed with or any disagreements were addressed in the HPI.      REVIEW OF EXTERNAL NOTES :         7/12/2025 -7/14/2025: Patient was admitted to the hospital for dehydration.    5/26/2025: Patient had right hip fracture and underwent femur open reduction and internal fixation of right distal femur and hardware revision.      REVIEW OF SYSTEMS :        Positives and Pertinent negatives as per HPI.     SURGICAL

## 2025-07-20 NOTE — DISCHARGE INSTRUCTIONS
Please call and follow-up with family doctor.  prescription from the pharmacy. Return to the ED is symptoms worsen.

## 2025-07-21 ENCOUNTER — TELEPHONE (OUTPATIENT)
Dept: FAMILY MEDICINE CLINIC | Age: 70
End: 2025-07-21

## 2025-07-21 NOTE — TELEPHONE ENCOUNTER
----- Message from JHOANA CADENA LPN sent at 7/18/2025  4:52 PM EDT -----  Regarding: FW: [facility] Appointment Request    ----- Message -----  From: Sierra Cooley  Sent: 7/18/2025   2:42 PM EDT  To: Abimbola Caicedo  Clinical Pool  Subject: [facility] Appointment Request                          [facility] Appointment Request    Patient needs appointment for [facility] Appointment Type: New to Provider.    Patient Requested Dates(s): first available appointment  Patient Requested Time: Anytime   Provider Name: Florencio Talbot MD    Reason for Appointment Request: New Patient - Requested Provider unavailable  --------------------------------------------------------------------------------------------------------------------------    Relationship to Patient: Self     Call Back Information: OK to leave message on voicemail  Preferred Call Back Number: Phone [phone]

## 2025-07-21 NOTE — TELEPHONE ENCOUNTER
Just confirming with you before we reach out to Sravanthi, that you are still not accepting New Patients at the moment.    It does look like she has an Establishing appointment scheduled with Dr Saldivar on 9/2, but this message was sent after that appointment was scheduled.    Please advise.

## 2025-07-22 NOTE — TELEPHONE ENCOUNTER
[employer] message sent, explaining you are not accepting patients but that we have resident physicians who are, or that she can keep her appt with Dr Dias that is already scheduled.    Scheduling ticket sent in [employer] message, with ability to schedule with our residents.

## 2025-07-29 ENCOUNTER — OFFICE VISIT (OUTPATIENT)
Dept: ENDOCRINOLOGY | Age: 70
End: 2025-07-29
Payer: MEDICARE

## 2025-07-29 VITALS
OXYGEN SATURATION: 100 % | HEART RATE: 96 BPM | BODY MASS INDEX: 25.39 KG/M2 | TEMPERATURE: 98.2 F | HEIGHT: 60 IN | DIASTOLIC BLOOD PRESSURE: 77 MMHG | SYSTOLIC BLOOD PRESSURE: 115 MMHG

## 2025-07-29 DIAGNOSIS — I10 PRIMARY HYPERTENSION: ICD-10-CM

## 2025-07-29 DIAGNOSIS — E11.65 TYPE 2 DIABETES MELLITUS WITH HYPERGLYCEMIA, WITHOUT LONG-TERM CURRENT USE OF INSULIN (HCC): Primary | ICD-10-CM

## 2025-07-29 DIAGNOSIS — E11.42 TYPE 2 DIABETES MELLITUS WITH DIABETIC POLYNEUROPATHY, WITHOUT LONG-TERM CURRENT USE OF INSULIN (HCC): ICD-10-CM

## 2025-07-29 PROCEDURE — 2022F DILAT RTA XM EVC RTNOPTHY: CPT | Performed by: FAMILY MEDICINE

## 2025-07-29 PROCEDURE — G8400 PT W/DXA NO RESULTS DOC: HCPCS | Performed by: FAMILY MEDICINE

## 2025-07-29 PROCEDURE — 3078F DIAST BP <80 MM HG: CPT | Performed by: FAMILY MEDICINE

## 2025-07-29 PROCEDURE — 1124F ACP DISCUSS-NO DSCNMKR DOCD: CPT | Performed by: FAMILY MEDICINE

## 2025-07-29 PROCEDURE — G8427 DOCREV CUR MEDS BY ELIG CLIN: HCPCS | Performed by: FAMILY MEDICINE

## 2025-07-29 PROCEDURE — 1159F MED LIST DOCD IN RCRD: CPT | Performed by: FAMILY MEDICINE

## 2025-07-29 PROCEDURE — 1036F TOBACCO NON-USER: CPT | Performed by: FAMILY MEDICINE

## 2025-07-29 PROCEDURE — 99214 OFFICE O/P EST MOD 30 MIN: CPT | Performed by: FAMILY MEDICINE

## 2025-07-29 PROCEDURE — 3074F SYST BP LT 130 MM HG: CPT | Performed by: FAMILY MEDICINE

## 2025-07-29 PROCEDURE — 3044F HG A1C LEVEL LT 7.0%: CPT | Performed by: FAMILY MEDICINE

## 2025-07-29 PROCEDURE — 1111F DSCHRG MED/CURRENT MED MERGE: CPT | Performed by: FAMILY MEDICINE

## 2025-07-29 PROCEDURE — G8417 CALC BMI ABV UP PARAM F/U: HCPCS | Performed by: FAMILY MEDICINE

## 2025-07-29 PROCEDURE — 1090F PRES/ABSN URINE INCON ASSESS: CPT | Performed by: FAMILY MEDICINE

## 2025-07-29 PROCEDURE — 3017F COLORECTAL CA SCREEN DOC REV: CPT | Performed by: FAMILY MEDICINE

## 2025-07-29 RX ORDER — LOSARTAN POTASSIUM 100 MG/1
100 TABLET ORAL DAILY
Qty: 90 TABLET | Refills: 0 | Status: SHIPPED | OUTPATIENT
Start: 2025-07-29

## 2025-07-29 RX ORDER — TIRZEPATIDE 7.5 MG/.5ML
7.5 INJECTION, SOLUTION SUBCUTANEOUS WEEKLY
Qty: 6 ML | Refills: 3 | Status: SHIPPED | OUTPATIENT
Start: 2025-07-29

## 2025-07-29 RX ORDER — TIZANIDINE 2 MG/1
2 TABLET ORAL EVERY 8 HOURS PRN
COMMUNITY
Start: 2025-06-18

## 2025-07-29 RX ORDER — BISOPROLOL FUMARATE AND HYDROCHLOROTHIAZIDE 10; 6.25 MG/1; MG/1
1 TABLET ORAL DAILY
Qty: 30 TABLET | Refills: 0 | Status: SHIPPED | OUTPATIENT
Start: 2025-07-29

## 2025-07-29 NOTE — PROGRESS NOTES
[facility] Department of Endocrinology Diabetes and Metabolism   [address]  [place] [address]  Phone: [phone]  Fax: [phone]    Date of Service: 7/29/2025    Primary Care Physician: No primary care provider on file.  Referring physician: No ref. provider found  Provider: JENNI Yap - CNP     Reason for the visit:  Type 2 diabetes    History of Present Illness:  The history is provided by the patient. No  was used. Accuracy of the patient data is excellent.  Sravanthi Lr is a very pleasant 69 y.o. female seen today for diabetes management     Sravanthi Lr was diagnosed with diabetes several years ago and currently on Jardiance 25 mg daily and Mounjaro 7.5 mg weekly    Was able to come off insulin at last visit with the addition of Mounjaro    Did not tolerate metformin- diarrhea    Checking blood sugars daily  Running 120-150's      Patient had a fall on Memorial Day and fractured her femur.  She is status post ORIF and is still nonweightbearing to that leg until Nov 2025.    Patient is out of all of her medication.  Unfortunately, her PCP suddenly passed away and she has been struggling to find another family practice office that is excepting patients.  Will refer her to a [facility] primary care physician.      Most recent A1c results summarized below  Lab Results   Component Value Date/Time    LABA1C 6.5 05/26/2025 04:33 AM    LABA1C 6.9 03/26/2025 12:29 PM    LABA1C 6.3 09/30/2024 11:15 AM     Patient has had no hypoglycemic episodes   The patient has been mindful of what has been eating and is following diabetes diet    I reviewed current medications and the patient has no issues with diabetes medications   The patient is due for an eye exam.  The patient  performs  own feet care. Podiatry as needed  Microvascular complications:  No Retinopathy, Nephropathy + Neuropathy   Macrovascular complications: no CAD, PVD, or

## 2025-08-11 PROBLEM — E86.0 DEHYDRATION: Status: RESOLVED | Noted: 2025-07-12 | Resolved: 2025-08-11

## 2025-08-13 ENCOUNTER — TELEPHONE (OUTPATIENT)
Dept: ENDOCRINOLOGY | Age: 70
End: 2025-08-13

## 2025-08-13 ENCOUNTER — OFFICE VISIT (OUTPATIENT)
Dept: ORTHOPEDIC SURGERY | Age: 70
End: 2025-08-13

## 2025-08-13 ENCOUNTER — TELEPHONE (OUTPATIENT)
Dept: ORTHOPEDIC SURGERY | Age: 70
End: 2025-08-13

## 2025-08-13 VITALS
SYSTOLIC BLOOD PRESSURE: 136 MMHG | DIASTOLIC BLOOD PRESSURE: 78 MMHG | HEART RATE: 91 BPM | TEMPERATURE: 99.7 F | OXYGEN SATURATION: 100 %

## 2025-08-13 DIAGNOSIS — S72.421A CLOSED DISPLACED FRACTURE OF LATERAL CONDYLE OF RIGHT FEMUR, INITIAL ENCOUNTER (HCC): Primary | ICD-10-CM

## 2025-08-13 DIAGNOSIS — R30.0 DYSURIA: Primary | ICD-10-CM

## 2025-08-13 DIAGNOSIS — N30.00 ACUTE CYSTITIS WITHOUT HEMATURIA: Primary | ICD-10-CM

## 2025-08-13 PROCEDURE — 99024 POSTOP FOLLOW-UP VISIT: CPT | Performed by: STUDENT IN AN ORGANIZED HEALTH CARE EDUCATION/TRAINING PROGRAM

## 2025-08-13 RX ORDER — CIPROFLOXACIN 250 MG/1
250 TABLET, FILM COATED ORAL 2 TIMES DAILY
Qty: 10 TABLET | Refills: 0 | Status: SHIPPED | OUTPATIENT
Start: 2025-08-13 | End: 2025-08-18

## 2025-08-13 RX ORDER — CEPHALEXIN 500 MG/1
500 CAPSULE ORAL 2 TIMES DAILY
Qty: 14 CAPSULE | Refills: 0 | Status: SHIPPED | OUTPATIENT
Start: 2025-08-13 | End: 2025-08-20

## (undated) DEVICE — BIPOLAR ELECTROHEMOSTASIS CATHETER: Brand: GOLD PROBE

## (undated) DEVICE — BIT DRL L300MM DIA4.3MM QUIK CPL PERC CALIB W/O STP REUSE

## (undated) DEVICE — GOWN,BREATHABLE SLV,AURORA,XLG,STRL: Brand: MEDLINE

## (undated) DEVICE — GRADUATE TRIANG MEASURE 1000ML BLK PRNT

## (undated) DEVICE — ELECTRODE PT RET AD L9FT HI MOIST COND ADH HYDRGEL CORDED

## (undated) DEVICE — SOLUTION IRRIG 3000ML STRL H2O USP UROMATIC PLAS CONT

## (undated) DEVICE — MEDICINE CUP, GRADUATED, STER: Brand: MEDLINE

## (undated) DEVICE — GLOVE ORANGE PI 7 1/2   MSG9075

## (undated) DEVICE — SCREW BNE L38MM DIA4.5MM PROX CORT TIB S STL ST LOK FULL
Type: IMPLANTABLE DEVICE | Site: FEMUR | Status: NON-FUNCTIONAL
Removed: 2025-05-26

## (undated) DEVICE — BLOCK BITE 60FR RUBBER ADLT DENTAL

## (undated) DEVICE — GUIDEWIRE ORTH DIA2.5MM LOK SMOOTH DRL TIP FLX THRD FOR

## (undated) DEVICE — ELECTRODE ES L3IN S STL BLDE INSUL DISP VALLEYLAB EDGE

## (undated) DEVICE — DRAINBAG,ANTI-REFLUX TOWER,L/F,2000ML,LL: Brand: MEDLINE

## (undated) DEVICE — SOLUTION IRRIG 3000ML 0.9% SOD CHL USP UROMATIC PLAS CONT

## (undated) DEVICE — CYSTO PACK: Brand: MEDLINE INDUSTRIES, INC.

## (undated) DEVICE — DRESSING HYDROFIBER AQUACEL AG ADVANTAGE 3.5X10 IN

## (undated) DEVICE — NEEDLE SCLERO 23 GAX4 MM 1.8 MMX200 CM CNTRST SHTH INTERJECT

## (undated) DEVICE — Device

## (undated) DEVICE — CATHETER F BLLN 5CC 16FR 2 W HYDRGEL COAT LESS TRAUM LUB

## (undated) DEVICE — SOLUTION SCRB 4OZ 4% CHG H2O AIDED FOR PREOPERATIVE SKIN

## (undated) DEVICE — WORKING LENGTH 155CM, WORKING CHANNEL 2.8MM: Brand: RESOLUTION 360 CLIP

## (undated) DEVICE — 4-PORT MANIFOLD: Brand: NEPTUNE 2

## (undated) DEVICE — SPONGE GZ W4XL4IN RAYON POLY FILL CVR W/ NONWOVEN FAB

## (undated) DEVICE — GAUZE,SPONGE,4"X4",8PLY,STRL,LF,10/TRAY: Brand: MEDLINE

## (undated) DEVICE — BIT DRL L300MM DIA3.2MM PERC QUIK CPL CALIB W/O STP REUSE

## (undated) DEVICE — CATHETER URET 5FR L70CM OPN END SGL LUMN INJ HUB FLEXIMA

## (undated) DEVICE — SYRINGE MED 30ML STD CLR PLAS LUERLOCK TIP N CTRL DISP

## (undated) DEVICE — DRAPE EQUIP CARM 72X42 IN RUBBER BND CLP

## (undated) DEVICE — GOWN,SIRUS,FABRNF,XL,20/CS: Brand: MEDLINE

## (undated) DEVICE — SYRINGE MED 10ML TRNSLUC BRL PLUNG BLK MRK POLYPR CTRL

## (undated) DEVICE — BIT DRL L145MM DIA3.2MM QUIK CPL W/O STP REUSE

## (undated) DEVICE — DRESSING HYDROFIBER AQUACEL AG ADVANTAGE 3.5X14 IN

## (undated) DEVICE — CATHETER ELECHEMSTAS 10 FRX300 CM BPLR STD PLUG GLD PRB

## (undated) DEVICE — LOWER EXT HIP DRAPE: Brand: MEDLINE INDUSTRIES, INC.

## (undated) DEVICE — TUBING SUCT 12FR MAL ALUM SHFT FN CAP VENT UNIV CONN W/ OBT

## (undated) DEVICE — BAG DRNGE COMB PK

## (undated) DEVICE — GUIDEWIRE ENDOSCP L150CM DIA0.035IN TIP 3CM PTFE NIT

## (undated) DEVICE — SINGLE-USE BIOPSY FORCEPS: Brand: RADIAL JAW 4